# Patient Record
Sex: FEMALE | Race: WHITE | Employment: UNEMPLOYED | URBAN - METROPOLITAN AREA
[De-identification: names, ages, dates, MRNs, and addresses within clinical notes are randomized per-mention and may not be internally consistent; named-entity substitution may affect disease eponyms.]

---

## 2016-08-11 LAB — TSH SERPL-ACNC: 1.78 UIU/ML (ref 0.45–4.5)

## 2016-08-15 LAB — PAP: NORMAL

## 2018-06-27 ENCOUNTER — TRANSFERRED RECORDS (OUTPATIENT)
Dept: HEALTH INFORMATION MANAGEMENT | Facility: CLINIC | Age: 23
End: 2018-06-27

## 2018-08-17 ENCOUNTER — TRANSFERRED RECORDS (OUTPATIENT)
Dept: HEALTH INFORMATION MANAGEMENT | Facility: CLINIC | Age: 23
End: 2018-08-17

## 2018-12-10 LAB
ABO + RH BLD: NORMAL
ABO + RH BLD: NORMAL
BLD GP AB SCN SERPL QL: NORMAL
HBV SURFACE AG SERPL QL IA: NORMAL
HCT VFR BLD AUTO: 43.6 %
HEMOGLOBIN: 14.6 G/DL (ref 11.7–15.7)
HIV 1+2 AB+HIV1 P24 AG SERPL QL IA: NORMAL
PLATELET # BLD AUTO: 189 10^9/L
RUBELLA ABY IGG: 17.2
RUBELLA ANTIBODY IGG QUANTITATIVE: NORMAL IU/ML

## 2018-12-17 LAB
C TRACH DNA SPEC QL PROBE+SIG AMP: NORMAL
N GONORRHOEA DNA SPEC QL PROBE+SIG AMP: NORMAL

## 2019-02-16 LAB — HEMOGLOBIN: 13.7 G/DL (ref 11.7–15.7)

## 2019-02-19 ENCOUNTER — HOSPITAL ENCOUNTER (OUTPATIENT)
Facility: CLINIC | Age: 24
Discharge: HOME | End: 2019-02-19
Attending: SPECIALIST | Admitting: SPECIALIST
Payer: COMMERCIAL

## 2019-02-19 VITALS
TEMPERATURE: 99.9 F | DIASTOLIC BLOOD PRESSURE: 75 MMHG | HEART RATE: 94 BPM | RESPIRATION RATE: 18 BRPM | SYSTOLIC BLOOD PRESSURE: 120 MMHG

## 2019-02-19 PROBLEM — Z36.89 ENCOUNTER FOR TRIAGE IN PREGNANT PATIENT: Status: ACTIVE | Noted: 2019-02-19

## 2019-02-19 LAB — RUPTURE OF FETAL MEMBRANES BY ROM PLUS: NEGATIVE

## 2019-02-19 PROCEDURE — 84112 EVAL AMNIOTIC FLUID PROTEIN: CPT | Performed by: SPECIALIST

## 2019-02-19 PROCEDURE — G0463 HOSPITAL OUTPT CLINIC VISIT: HCPCS

## 2019-02-19 RX ORDER — VITAMIN A ACETATE, .BETA.-CAROTENE, ASCORBIC ACID, CHOLECALCIFEROL, .ALPHA.-TOCOPHEROL ACETATE, DL-, THIAMINE MONONITRATE, RIBOFLAVIN, NIACINAMIDE, PYRIDOXINE HYDROCHLORIDE, FOLIC ACID, CYANOCOBALAMIN, CALCIUM CARBONATE, FERROUS FUMARATE, ZINC OXIDE, AND CUPRIC OXIDE 2000; 2000; 120; 400; 22; 1.84; 3; 20; 10; 1; 12; 200; 27; 25; 2 [IU]/1; [IU]/1; MG/1; [IU]/1; MG/1; MG/1; MG/1; MG/1; MG/1; MG/1; UG/1; MG/1; MG/1; MG/1; MG/1
1 TABLET ORAL DAILY
COMMUNITY
End: 2019-03-05

## 2019-02-19 SDOH — HEALTH STABILITY: MENTAL HEALTH: HOW OFTEN DO YOU HAVE A DRINK CONTAINING ALCOHOL?: NEVER

## 2019-02-19 NOTE — PLAN OF CARE
1245 Spoke with Dr. Bar, updated her on above assessments and RomPlus negative.  Discharge order received.  F/U as scheduled.     1250  Patient education pamphlets given discharge instructions. Patient instructed to report change in fetal movement, vaginal leaking of fluid or bleeding, abdominal pain, or any concerns related to the pregnancy to her nurse/physician.  Patient verbalized understanding of education and verbalized agreement with plan. Discharged to home ambulatory at 1250.

## 2019-02-19 NOTE — PLAN OF CARE
"Pt [place] the [facility] [place] Pt states noticing \"clear, liquidy\" discharge this morning. Pt called her clinic which advised to come to be evaluated to rule out rupture of membranes. Verbal consent received to monitor baby. ROM + sent to lab, pending results.VSS. Doppler tones of 155.  Pt denies bleeding or pressure but states feeling some \"light cramping\". Pt reports positive fetal movement.   "

## 2019-02-19 NOTE — DISCHARGE INSTRUCTIONS
Discharge Instruction for Undelivered Patients      You were seen for: Membrane Assessment  We Consulted: Dr. Bar  You had (Test or Medicine):doptones for FHR, RomPlus Negative     Diet:   Drink 8 to 12 glasses of liquids (milk, juice, water) every day.  You may eat meals and snacks.     Activity:  Resume normal activity.    Call your provider if you notice:  Swelling in your face or increased swelling in your hands or legs.  Headaches that are not relieved by Tylenol (acetaminophen).  Changes in your vision (blurring: seeing spots or stars.)  Nausea (sick to your stomach) and vomiting (throwing up).   Weight gain of 5 pounds or more per week.  Heartburn that doesn't go away.  Signs of bladder infection: pain when you urinate (use the toilet), need to go more often and more urgently.  The bag of diaz (rupture of membranes) breaks, or you notice leaking in your underwear.  Bright red blood in your underwear.  Abdominal (lower belly) or stomach pain.  For first baby: Contractions (tightening) less than 5 minutes apart for one hour or more.  Second (plus) baby: Contractions (tightening) less than 10 minutes apart and getting stronger.  *If less than 34 weeks: Contractions (tightenings) more than 6 times in one hour.  Increase or change in vaginal discharge (note the color and amount)      Follow-up:  As scheduled in the clinic

## 2019-02-28 DIAGNOSIS — O36.80X0 PREGNANCY WITH INCONCLUSIVE FETAL VIABILITY: Primary | ICD-10-CM

## 2019-03-05 ENCOUNTER — ANCILLARY PROCEDURE (OUTPATIENT)
Dept: ULTRASOUND IMAGING | Facility: CLINIC | Age: 24
End: 2019-03-05
Payer: COMMERCIAL

## 2019-03-05 ENCOUNTER — PRENATAL OFFICE VISIT (OUTPATIENT)
Dept: MIDWIFE SERVICES | Facility: CLINIC | Age: 24
End: 2019-03-05
Payer: COMMERCIAL

## 2019-03-05 VITALS
HEIGHT: 68 IN | WEIGHT: 132 LBS | SYSTOLIC BLOOD PRESSURE: 121 MMHG | DIASTOLIC BLOOD PRESSURE: 74 MMHG | BODY MASS INDEX: 20 KG/M2 | HEART RATE: 76 BPM

## 2019-03-05 DIAGNOSIS — R33.9 URINARY RETENTION: ICD-10-CM

## 2019-03-05 DIAGNOSIS — F41.8 OTHER SPECIFIED ANXIETY DISORDERS: ICD-10-CM

## 2019-03-05 DIAGNOSIS — Z36.89 ENCOUNTER FOR FETAL ANATOMIC SURVEY: ICD-10-CM

## 2019-03-05 DIAGNOSIS — O36.80X0 PREGNANCY WITH INCONCLUSIVE FETAL VIABILITY: ICD-10-CM

## 2019-03-05 DIAGNOSIS — Z34.02 ENCOUNTER FOR SUPERVISION OF NORMAL FIRST PREGNANCY IN SECOND TRIMESTER: Primary | ICD-10-CM

## 2019-03-05 PROBLEM — Z34.00 SUPERVISION OF NORMAL IUP (INTRAUTERINE PREGNANCY) IN PRIMIGRAVIDA: Status: ACTIVE | Noted: 2019-03-05

## 2019-03-05 LAB
ALBUMIN UR-MCNC: NEGATIVE MG/DL
APPEARANCE UR: CLEAR
BILIRUB UR QL STRIP: NEGATIVE
COLOR UR AUTO: YELLOW
GLUCOSE UR STRIP-MCNC: NEGATIVE MG/DL
HGB UR QL STRIP: NEGATIVE
KETONES UR STRIP-MCNC: NEGATIVE MG/DL
LEUKOCYTE ESTERASE UR QL STRIP: NEGATIVE
NITRATE UR QL: NEGATIVE
PH UR STRIP: 7 PH (ref 5–7)
SOURCE: NORMAL
SP GR UR STRIP: 1.01 (ref 1–1.03)
UROBILINOGEN UR STRIP-ACNC: 0.2 EU/DL (ref 0.2–1)

## 2019-03-05 PROCEDURE — 81003 URINALYSIS AUTO W/O SCOPE: CPT | Performed by: ADVANCED PRACTICE MIDWIFE

## 2019-03-05 PROCEDURE — 87086 URINE CULTURE/COLONY COUNT: CPT | Performed by: ADVANCED PRACTICE MIDWIFE

## 2019-03-05 PROCEDURE — 76805 OB US >/= 14 WKS SNGL FETUS: CPT | Performed by: OBSTETRICS & GYNECOLOGY

## 2019-03-05 PROCEDURE — 99207 ZZC FIRST OB VISIT: CPT | Performed by: ADVANCED PRACTICE MIDWIFE

## 2019-03-05 ASSESSMENT — ANXIETY QUESTIONNAIRES
7. FEELING AFRAID AS IF SOMETHING AWFUL MIGHT HAPPEN: NOT AT ALL
2. NOT BEING ABLE TO STOP OR CONTROL WORRYING: NOT AT ALL
3. WORRYING TOO MUCH ABOUT DIFFERENT THINGS: NOT AT ALL
5. BEING SO RESTLESS THAT IT IS HARD TO SIT STILL: NOT AT ALL
1. FEELING NERVOUS, ANXIOUS, OR ON EDGE: SEVERAL DAYS
IF YOU CHECKED OFF ANY PROBLEMS ON THIS QUESTIONNAIRE, HOW DIFFICULT HAVE THESE PROBLEMS MADE IT FOR YOU TO DO YOUR WORK, TAKE CARE OF THINGS AT HOME, OR GET ALONG WITH OTHER PEOPLE: NOT DIFFICULT AT ALL
6. BECOMING EASILY ANNOYED OR IRRITABLE: NOT AT ALL
GAD7 TOTAL SCORE: 1

## 2019-03-05 ASSESSMENT — PATIENT HEALTH QUESTIONNAIRE - PHQ9
SUM OF ALL RESPONSES TO PHQ QUESTIONS 1-9: 1
5. POOR APPETITE OR OVEREATING: NOT AT ALL

## 2019-03-05 ASSESSMENT — MIFFLIN-ST. JEOR: SCORE: 1397.25

## 2019-03-05 NOTE — PATIENT INSTRUCTIONS
Thank you for coming to see the Midwives at the   [facility]!      We will notify you about your labs that were drawn today once we get the results back.  If you have [employer] your lab results will be posted there.      Someone from the clinic will call you personally or send you a [employer] message with your results.      If you need any refills of medications please call your pharmacy and they will contact us.      If you have a medical emergency please call 911.      If you have any concerns about today's visit, wish to schedule another appointment, or have an urgent medical concern please call our office at [phone]. You can also make appointments through [employer].      After hours you may also call the clinic number above to be connected with [facility] after hours triage nurse.  The nurse can page the midwife on call if needed. There is always a midwife on call 24 hours a day.    Prenatal Care Recommendations:    Before 14 weeks: Dating ultrasound, genetic testing       This ultrasound helps us determine your dates accurately. Innatal (genetic screening test) can be drawn anytime after 10 weeks of gestation.    16 weeks: Optional genetic testing single AFP       This testing helps understand your baby's risk for some genetic abnormalities.    18-22 weeks:  Screening anatomy ultrasound       This testing will look for early growth abnormalities, placenta location, and may tell the baby's gender if you wish to find out.    24-27 weeks: One hour diabetes test (GCT) and complete blood count       This test helps identify diabetes of pregnancy or gestational diabetes.  We also look   at the iron in your blood and how well your blood clots.    28 - 36 weeks: Tetanus shot (Tdap)       This shot helps protect you and your baby from whooping cough.    36 weeks and later: Group B Strep test (GBS)       This test helps predict if you need antibiotics in labor to prevent infection for your baby.    Anytime  September to April:  Flu shot       This shot helps protect you and your family from the flu.  This is especially important during pregnancy.        The typical schedule after your first visit today you can expect:     Visit 2 - 12-16 weeks  Visit 3 - 20 weeks  Visit 4 - 24 weeks  Visit 5 - 28 weeks  Visit 6 - 30 weeks  Visit 7 - 32 weeks  Visit 8 - 34 weeks  Visit 9 - 36 weeks  Weekly after 36 weeks until delivery.        Any time during or after your pregnancy you may experience increased depression and/or mood changes.    We are here to support you. Please contact us if you are:    Feeling anxious    Overwhelmed or sad     Trouble sleeping    Crying uncontrollably    Trouble caring for yourself or baby.    Any thoughts of hurting yourself, your baby, or anyone else    If anything comes up between your visits or you have concerns please don't hesitate to contact us.    Secure access to your medical record:  Use [employer] (secure email communication and access to your chart) to send your primary care provider a message or make an appointment. Ask someone on your Team how to sign up for [employer]. To log on to Collect or for more information in [employer] please visit the website at [email or web address].       Certified Nurse Midwife (CNM) Team    Sophia CONNOLLY, SANDEEP Johnston DNP, APRN, SANDEEP CONNOLLY, SANDEEP Sandra, APRN, CNM, [facility]  Rohini Stout, YRN, APRN, CNM      Again, thank you for choosing the midwives at [facility].  We are excited to be a part of your pregnancy. Please let us know how we can best partner with you to improve your and your family's health.    Round Ligament Pain    Pregnancy can entail many normal discomforts. One of those discomforts may be round ligament pain. Round ligament pain occurs as your uterus and your baby grow and the muscles begin to stretch more in your abdomen. Round ligament pain is normal and not dangerous but can be very  uncomfortable      Round ligament pain is typically described as an unpleasant sensation that ranges from a sharp knifelike pain to dull intermittent pain in the lower abdominal/suprapubic area of a pregnant woman      Virtually all pregnant women will experience this pain at some point during their pregnancies      It typically manifests between 16 and 20 weeks of pregnancy and can be incredibly bothersome especially for women who remain very active during their pregnancies       Please discuss with your midwife if you are having this type of pain; sometimes the pain can be associated with other medical conditions so it is important for us to assess you just to make sure    Comfort measures    There are certain things you can do to cope with round ligament pain and ease the discomfort you are having      Pregnancy support belt      Tylenol      Hot/ice packs      Baths       Exercise such as yoga and swimming that help stretch muscles      Prenatal massage      Reflexology to waist and pelvic joints       Positioning such as side-lying and hands and knees, make sure your abdomen is  well supported with pillows while doing different positions

## 2019-03-05 NOTE — RESULT ENCOUNTER NOTE
Results discussed directly with patient while patient was present. Any further details documented in the note.   MOHSEN Ovalle CNM

## 2019-03-05 NOTE — PROGRESS NOTES
SUBJECTIVE:     HPI:    This is a 23 year old female patient,  who presents for her first obstetrical visit. Wanted to get more of a personal experience with midwifery care. They have been  for about 6 months.     SOPHIA: 2019, by Ultrasound.  She is 19w6d weeks.  Her cycles are regular.  Her last menstrual period was normal.   Since her LMP, she has experienced  fatigue, headache and nasal congestion, heartburn, cramps, skin dryness, headaches, decreased libido ).   She denies nausea, emesis, abdominal pain, loss of appetite, vaginal discharge, dysuria, pelvic pain, urinary urgency, lightheadedness, urinary frequency, vaginal bleeding, hemorrhoids and constipation.    Additional History: [facility]    Have you travelled during the pregnancy?No  Have your sexual partner(s) travelled during the pregnancy?No      HISTORY:   Planned Pregnancy: Yes  Marital Status:   Occupation: not currently emplouyed  Living in Household: Spouse    Past History:  Her past medical history   Past Medical History:   Diagnosis Date     [place] (generalized anxiety disorder)      IBS (irritable bowel syndrome)      Irregular menses      Ovarian cyst    .      She has a history of  first pregnancy    Since her last LMP she denies use of alcohol, tobacco and street drugs.    Past medical, surgical, social and family history were reviewed and updated in [facility].        Current Outpatient Medications   Medication     Prenat w/o [id number]-DHA (PNV-DHA PO)     No current facility-administered medications for this visit.        ROS:   12 point review of systems negative other than symptoms noted below.  Constitutional: Fatigue  Head: Nasal Congestion  Gastrointestinal: Heartburn  Genitourinary: Cramps  Skin: Skin Dryness  Neurologic: Headaches  Endocrine: Decreased Libido      OBJECTIVE:     EXAM:  /74 (BP Location: Left arm, Patient Position: Sitting, Cuff Size: Adult Regular)   Pulse 76   Ht  "1.727 m (5' 8\")   Wt 59.9 kg (132 lb)   LMP 10/06/2018 (Approximate)   BMI 20.07 kg/m   Body mass index is 20.07 kg/m .    GENERAL: healthy, alert and no distress  EYES: Eyes grossly normal to inspection, PERRL and conjunctivae and sclerae normal  HENT: ear canals and TM's normal, nose and mouth without ulcers or lesions  NECK: no adenopathy, no asymmetry, masses, or scars and thyroid normal to palpation  RESP: lungs clear to auscultation - no rales, rhonchi or wheezes  CV: regular rate and rhythm, normal S1 S2, no S3 or S4, no murmur, click or rub, no peripheral edema and peripheral pulses strong  ABDOMEN: soft, nontender, no hepatosplenomegaly, no masses and bowel sounds normal  MS: no gross musculoskeletal defects noted, no edema  SKIN: no suspicious lesions or rashes  NEURO: Normal strength and tone, mentation intact and speech normal  PSYCH: mentation appears normal, affect normal/bright    ASSESSMENT/PLAN:       ICD-10-CM    1. Encounter for supervision of normal first pregnancy in second trimester Z34.02    2. Urinary retention R33.9 UA reflex to Microscopic     Urine Culture Aerobic Bacterial   3. Other specified anxiety disorders F41.8        23 year old , 19w6d weeks of pregnancy with SOPHIA of 2019, by Ultrasound     consult for US for AMA patients: NA  Patient [facility] 1st trimester screening [facility]     COUNSELING    Instructed on use of triage nurse line and contacting the on call CNM after hours in an emergency.     Symptoms of N&V and fatigue usually start to resolve around 12-16 weeks     Reviewed CNM philosophy, call schedule for labor and delivery, and FSH for delivery    1st OB handout given outlining appointment spacing and CNM information    Reviewed round ligament pain and handout given    Reviewed exercise and nutrition; TWG 6# discussed taking in 300 extra calories daily    Recommend to gain 25-35 pounds with her pregnancy.    Discussed OTC medications. OB med list " given    Encouraged patient to take PNV's/DHA    Travel precautions discussed, no air travel after 36 weeks and Zika Virus discussed    [facility]ed records from [facility] and labs    Plans to return in 1 month and do GCT a month after that    Will return to the clinic in 4 weeks for her next routine prenatal check.  Will call to be seen sooner if problems arise.      MOHSEN Ovalle CNM

## 2019-03-06 ENCOUNTER — TELEPHONE (OUTPATIENT)
Dept: MIDWIFE SERVICES | Facility: CLINIC | Age: 24
End: 2019-03-06

## 2019-03-06 LAB
BACTERIA SPEC CULT: NORMAL
Lab: NORMAL
SPECIMEN SOURCE: NORMAL

## 2019-03-06 ASSESSMENT — ANXIETY QUESTIONNAIRES: GAD7 TOTAL SCORE: 1

## 2019-03-06 NOTE — TELEPHONE ENCOUNTER
Melina called concerned that their new puppy who got wet playing in the snow and now smells like wet dog is harmful to her  Reassured pt that although wet dog smell is not pleasant she will not be harmed by it.   Melina's  will give the puppy a bath after work  Carmen Edward RN on 3/6/2019 at 9:16 AM

## 2019-03-07 ENCOUNTER — TELEPHONE (OUTPATIENT)
Dept: MIDWIFE SERVICES | Facility: CLINIC | Age: 24
End: 2019-03-07

## 2019-03-07 ENCOUNTER — NURSE TRIAGE (OUTPATIENT)
Dept: NURSING | Facility: CLINIC | Age: 24
End: 2019-03-07

## 2019-03-07 NOTE — TELEPHONE ENCOUNTER
Melina's new puppy had an accident on the floor. She cleaned it up-wore rubber gloves- and washed her hands. Reassured Melina that was fine to do and no there is nothing from the pupppy's accident that can be airborne-as far as bacteria-that could harm her.     Carmen Edward RN on 3/7/2019 at 1:44 PM

## 2019-03-09 ENCOUNTER — TELEPHONE (OUTPATIENT)
Dept: MIDWIFE SERVICES | Facility: CLINIC | Age: 24
End: 2019-03-09

## 2019-03-09 ENCOUNTER — NURSE TRIAGE (OUTPATIENT)
Dept: NURSING | Facility: CLINIC | Age: 24
End: 2019-03-09

## 2019-03-09 ENCOUNTER — HOSPITAL ENCOUNTER (OUTPATIENT)
Facility: CLINIC | Age: 24
Discharge: HOME | End: 2019-03-09
Attending: ADVANCED PRACTICE MIDWIFE | Admitting: ADVANCED PRACTICE MIDWIFE
Payer: COMMERCIAL

## 2019-03-09 VITALS — SYSTOLIC BLOOD PRESSURE: 118 MMHG | TEMPERATURE: 100 F | DIASTOLIC BLOOD PRESSURE: 62 MMHG | RESPIRATION RATE: 16 BRPM

## 2019-03-09 DIAGNOSIS — B96.89 BACTERIAL VAGINOSIS: Primary | ICD-10-CM

## 2019-03-09 DIAGNOSIS — N76.0 BACTERIAL VAGINOSIS: Primary | ICD-10-CM

## 2019-03-09 LAB
ABO + RH BLD: NORMAL
ABO + RH BLD: NORMAL
ALBUMIN SERPL-MCNC: 3.1 G/DL (ref 3.4–5)
ALBUMIN UR-MCNC: NEGATIVE MG/DL
ALP SERPL-CCNC: 64 U/L (ref 40–150)
ALT SERPL W P-5'-P-CCNC: 20 U/L (ref 0–50)
ANION GAP SERPL CALCULATED.3IONS-SCNC: 6 MMOL/L (ref 3–14)
APPEARANCE UR: CLEAR
AST SERPL W P-5'-P-CCNC: 20 U/L (ref 0–45)
BACTERIA #/AREA URNS HPF: ABNORMAL /HPF
BILIRUB SERPL-MCNC: 0.2 MG/DL (ref 0.2–1.3)
BILIRUB UR QL STRIP: NEGATIVE
BLD GP AB SCN SERPL QL: NORMAL
BLOOD BANK CMNT PATIENT-IMP: NORMAL
BUN SERPL-MCNC: 5 MG/DL (ref 7–30)
CALCIUM SERPL-MCNC: 9 MG/DL (ref 8.5–10.1)
CHLORIDE SERPL-SCNC: 109 MMOL/L (ref 94–109)
CO2 SERPL-SCNC: 25 MMOL/L (ref 20–32)
COLOR UR AUTO: ABNORMAL
CREAT SERPL-MCNC: 0.44 MG/DL (ref 0.52–1.04)
ERYTHROCYTE [DISTWIDTH] IN BLOOD BY AUTOMATED COUNT: 13.3 % (ref 10–15)
GFR SERPL CREATININE-BSD FRML MDRD: >90 ML/MIN/{1.73_M2}
GLUCOSE SERPL-MCNC: 98 MG/DL (ref 70–99)
GLUCOSE UR STRIP-MCNC: 30 MG/DL
HCT VFR BLD AUTO: 36.9 % (ref 35–47)
HGB BLD-MCNC: 12.6 G/DL (ref 11.7–15.7)
HGB UR QL STRIP: NEGATIVE
KETONES UR STRIP-MCNC: NEGATIVE MG/DL
LEUKOCYTE ESTERASE UR QL STRIP: NEGATIVE
MCH RBC QN AUTO: 30.7 PG (ref 26.5–33)
MCHC RBC AUTO-ENTMCNC: 34.1 G/DL (ref 31.5–36.5)
MCV RBC AUTO: 90 FL (ref 78–100)
NITRATE UR QL: NEGATIVE
PH UR STRIP: 6 PH (ref 5–7)
PLATELET # BLD AUTO: 193 10E9/L (ref 150–450)
POTASSIUM SERPL-SCNC: 4.7 MMOL/L (ref 3.4–5.3)
PROT SERPL-MCNC: 7.2 G/DL (ref 6.8–8.8)
RBC # BLD AUTO: 4.11 10E12/L (ref 3.8–5.2)
RBC #/AREA URNS AUTO: 1 /HPF (ref 0–2)
SODIUM SERPL-SCNC: 140 MMOL/L (ref 133–144)
SOURCE: ABNORMAL
SP GR UR STRIP: 1 (ref 1–1.03)
SPECIMEN EXP DATE BLD: NORMAL
SPECIMEN SOURCE: ABNORMAL
SQUAMOUS #/AREA URNS AUTO: <1 /HPF (ref 0–1)
UROBILINOGEN UR STRIP-MCNC: NORMAL MG/DL (ref 0–2)
WBC # BLD AUTO: 10.6 10E9/L (ref 4–11)
WBC #/AREA URNS AUTO: 4 /HPF (ref 0–5)
WET PREP SPEC: ABNORMAL

## 2019-03-09 PROCEDURE — G0463 HOSPITAL OUTPT CLINIC VISIT: HCPCS

## 2019-03-09 PROCEDURE — 85027 COMPLETE CBC AUTOMATED: CPT | Performed by: ADVANCED PRACTICE MIDWIFE

## 2019-03-09 PROCEDURE — 87210 SMEAR WET MOUNT SALINE/INK: CPT | Performed by: ADVANCED PRACTICE MIDWIFE

## 2019-03-09 PROCEDURE — 99214 OFFICE O/P EST MOD 30 MIN: CPT | Performed by: ADVANCED PRACTICE MIDWIFE

## 2019-03-09 PROCEDURE — 81001 URINALYSIS AUTO W/SCOPE: CPT | Performed by: ADVANCED PRACTICE MIDWIFE

## 2019-03-09 PROCEDURE — 86850 RBC ANTIBODY SCREEN: CPT | Performed by: ADVANCED PRACTICE MIDWIFE

## 2019-03-09 PROCEDURE — 86900 BLOOD TYPING SEROLOGIC ABO: CPT | Performed by: ADVANCED PRACTICE MIDWIFE

## 2019-03-09 PROCEDURE — 36415 COLL VENOUS BLD VENIPUNCTURE: CPT | Performed by: ADVANCED PRACTICE MIDWIFE

## 2019-03-09 PROCEDURE — 86901 BLOOD TYPING SEROLOGIC RH(D): CPT | Performed by: ADVANCED PRACTICE MIDWIFE

## 2019-03-09 PROCEDURE — 80053 COMPREHEN METABOLIC PANEL: CPT | Performed by: ADVANCED PRACTICE MIDWIFE

## 2019-03-09 PROCEDURE — 25000132 ZZH RX MED GY IP 250 OP 250 PS 637

## 2019-03-09 RX ORDER — METRONIDAZOLE 7.5 MG/G
1 GEL VAGINAL AT BEDTIME
Qty: 25 G | Refills: 0 | Status: SHIPPED | OUTPATIENT
Start: 2019-03-09 | End: 2019-03-15

## 2019-03-09 RX ORDER — ACETAMINOPHEN 325 MG/1
TABLET ORAL
Status: COMPLETED
Start: 2019-03-09 | End: 2019-03-09

## 2019-03-09 RX ORDER — ONDANSETRON 2 MG/ML
4 INJECTION INTRAMUSCULAR; INTRAVENOUS EVERY 6 HOURS PRN
Status: DISCONTINUED | OUTPATIENT
Start: 2019-03-09 | End: 2019-03-09 | Stop reason: HOSPADM

## 2019-03-09 RX ORDER — ACETAMINOPHEN 325 MG/1
650 TABLET ORAL EVERY 4 HOURS PRN
Status: DISCONTINUED | OUTPATIENT
Start: 2019-03-09 | End: 2019-03-09 | Stop reason: HOSPADM

## 2019-03-09 RX ADMIN — ACETAMINOPHEN 650 MG: 325 TABLET ORAL at 17:26

## 2019-03-09 RX ADMIN — ACETAMINOPHEN 650 MG: 325 TABLET, FILM COATED ORAL at 17:26

## 2019-03-09 NOTE — TELEPHONE ENCOUNTER
"Melina paged on call CNM at 0610 c/o right sided \"stretching/discomfort\" that radiates into leg. Denies sharp, shooting, and stabbing pain. Denies leakage of fluid, contractions, vaginal bleeding. States it started last night and then she was able to fall asleep, now she woke up to use the bathroom and its uncomfortable and she cannot fall back asleep. Good fetal movement.    Reviewed normal pregnancy discomforts. Discussed it sounds like round ligament pain with the stretching nature and it being one sided. Recommend position changes, heat, ice, bath, tylenol, and hydration. Patient to lay down and monitor and if pain persists or worsens may be seen in MAC for evaluation.     MOHSEN Boo, CNM    "

## 2019-03-09 NOTE — TELEPHONE ENCOUNTER
"Page received from [facility] regarding abdominal pain. Call returned to Melina who states that she has had a \"pulling/tearing\" sensation on her R side that extends from lateral of her belly button down into \"where the leg meets the abdomen\".  She reports that she spoke with Sophia HOPKINS this morning, but that the pain has not been getting better since then.  Melina did use heat on the area and \"propped myself up in bed\" after talking to Sophia, which she states helped.  Melina reports that she has not tried any Tylenol.  Melina also reports a \"tearing\" pain right under her belly button.      Melina denies any contractions, vaginal bleeding, or leaking of fluid.  She denies any urinary symptoms and states that she had a urine culture earlier this week.  Melina also denies any vaginal symptoms. She denies any constipation and states that her last bowel movement was this morning.     Discussed that the pain Melina is experiencing sounds like normal stretching and round ligament pain.  Offered Melina a MAC visit vs continuing to monitor.  Melina would like to continue to monitor at this time.  Recommended Melina take a bath, use heat and take Tylenol.  Discussed warning signs and when to call.  Will follow up with Melina later today if I do not hear back from her.    Gabriela Johnston, DNP, APRN, CNM    "

## 2019-03-09 NOTE — PLAN OF CARE
"1634- Pt presents ambulatory, unaccompanied, stating \"lower abdominal pain\" as the reason for her visit. Pt is a  20w3d midwife patient of [facility]. Gabriela Ryan CNM is on call. Pt to MAC room 2, oriented to room and call light, verbal consent for toco received, monitor applied. Fht's by doppler are 155-165 over 2 minutes with increases present and no decreases noted. Pt denies VB, LOF, low back pain or pelvic pressure. She describes a right sided vertical \"pulling or tearing\" just aside the umbilicus that radiates to the groin area and horizontal\" stretching\" below the umbilicus. Pt denies fever, chills, dysuria or vaginal symptoms. Pt denies intercourse in the past 48 hours. Has not taken anything for the pain. Heat applied helped last evening but doesn't help now.     1710-pt turned lab away, crying and shaking, stating that she has a fear of needles and has \"a lot going on besides just this\" and that she was surprised by the  with no previous mention of lab draws being done. Pt reassured and RN will call lab back after pt talks with Gabriela Ryan CNM.     171-Wet prep and UA collected and sent.    174-Lab at bedside. Gabriela Ryan CNM is with the pt.   180-Gabriela Ryan CNM at bedside to discuss results with pt.  185-Discharge instructions reviewed with pt . She verbalized understanding and agreement. Rx called to [facility] by Gabriela Johnston CNM. Pt /c'd to home ambulatory.   "

## 2019-03-09 NOTE — TELEPHONE ENCOUNTER
"Second page received from [facility] regarding abdominal pain.  Call returned to Melina who states that \"the pain is kind of the same\", but that she did notice a uterine tightening while she was eating. She reports eating and drinking normally today.  Melina desires to come in for evaluation.  MAC notified.     Gabriela Johnston, DNP, APRN, CNM    "

## 2019-03-09 NOTE — DISCHARGE INSTRUCTIONS
Discharge Instruction for Undelivered Patients      You were seen for: evaluation of abdominal pain  We Consulted: Gabriela Ryan CNM  You had (Test or Medicine):fetal heart tones by doppler, UA, labs.     Diet:   Drink 8 to 12 glasses of liquids (milk, juice, water) every day.  You may eat meals and snacks.     Activity:  Count fetal kicks everyday (see handout)  Call your doctor or nurse midwife if your baby is moving less than usual.     Call your provider if you notice:  Swelling in your face or increased swelling in your hands or legs.  Headaches that are not relieved by Tylenol (acetaminophen).  Changes in your vision (blurring: seeing spots or stars.)  Nausea (sick to your stomach) and vomiting (throwing up).   Weight gain of 5 pounds or more per week.  Heartburn that doesn't go away.  Signs of bladder infection: pain when you urinate (use the toilet), need to go more often and more urgently.  The bag of diaz (rupture of membranes) breaks, or you notice leaking in your underwear.  Bright red blood in your underwear.  Abdominal (lower belly) or stomach pain.  For first baby: Contractions (tightening) less than 5 minutes apart for one hour or more.  *If less than 34 weeks: Contractions (tightenings) more than 6 times in one hour.  Increase or change in vaginal discharge (note the color and amount)     prescription at [facility].     Follow-up:  As scheduled in the clinic

## 2019-03-09 NOTE — PROGRESS NOTES
"MATERNAL ASSESSMENT CENTER CNM TRIAGE NOTE    Melina Palacios is a 23 year old  with and IUP at 20w3d who presents with complaint of R-sided lower abdominal/pelvic pain and pain near her umbilicus.      Melina reports that pain began last night after she got out of bed to go to the bathroom. Pain starts from lateral to her belly button on her right side and goes down to \"where the leg meets the abdomen\".  She describes it as a \"pulling/tearing\" pain.  She states that the pain \"comes and goes\" and is worse with activity.  When at rest, Melina reports that the pain is a \"dull ache\".  Melina states that heat and propping herself up in bed did help, and that she hasn't tried any other comfort measures.  Melina also reports a \"tearing\" pain just underneath her belly button \"especially when I go to the bathroom\".  Melina had a negative UC this week and denies any urinary symptoms.  Melina also denies any vaginal symptoms and states that her bowel movements have been regular, with the last one being this morning.  She denies any recent intercourse.  Melina reports that she had one tightening of her uterus while she was eating, but denies having any more contractions.    Melina is anxious upon arrival.  She began crying and shaking when lab arrived to draw blood, and sent lab away per RN.  Melina states that \"I've got a lot going on\".  She consents to a UA, wet prep, and bloodwork.    Patient has felt \"a little\" fetal movement so far this pregnancy.  Denies ROM   Denies vaginal bleeding  [facility]. Transfered to [facility] at 19 weeks, on 3/5/19.    Problems this pregnancy:   1. Anxiety disorder    ROS:  Patient is alert and oriented      PHYSICAL EXAM:  LMP 10/06/2018 (Approximate)     FHT's 150's via Doppler.        Contractions: Pt is not kimberly while on the monitor for 20 minutes.  Melina states that she felt two \"tightenings\" while here but \"it just " "feels tighter down there\".    Abdomen: gravid, mildly tender to palpation at fundus.  Bloody show: no  Cervix: Deferred  Membranes are intact     Blood type A+. UA unremarkable, CBC WNL, CMP WNL for pregnancy. Tylenol given. Declined GC/CT testing.     Wet prep positive clue cells.      ASSESSMENT :   23 year old  with trevino IUP 20w3d with abdominal/pelvic pain.  Bacterial vaginosis  FHTs 150's  GBS unknown and membranes intact    PLAN:  Discharge home.  Discussed vaginal infections in pregnancy, why they occur and symptoms.  Melina reports that she has had BV before, and that the Flagyl pills are difficult for her to swallow.  Rx for Metrogel x5 days sent to preferred pharmacy.  No alcohol while using Flagyl.     Discussed that round ligament pain and fundal stretching could also be causing some of Melina's discomfort.  Reviewed comfort measures.     Discussed that if pain does not improve with treatment, if pain worsens or if any other symptoms arise such as leaking of fluid, vaginal bleeding or contractions, Melina should call or present to clinic.   Continue routine prenatal care as scheduled.    Instructed to please refer to the discharge handouts, the RN triage line or on-call CNM for any questions or concerns.  Pt verbalizes understanding and agreement with current plan of care.    MOHSEN Anne CNM      30 minutes were spent face to face with the patient today discussing her history, diagnosis, and follow-up plan as noted above. Over 50% of the visit was spent in counseling and coordination of care.    Total Visit Time: 30 minutes.     "

## 2019-03-09 NOTE — TELEPHONE ENCOUNTER
"Reason for Call/Nurse Assessment:  22 y/o female 20w 3d lower abdomen right side (constant, stronger on right side) by belly button down right leg, unable to get comfortable, has not tried any Tylenol,  No vag bleeding, no fluid leaking, baby active now, urinating fine, able to empty bladder, no fever.    RN Action/Disposiiton:  [place] for [facility], paged Sophia Rhodes in [facility] at 6:14AM, advised patient to call me back if she has no heard from Midwife in 15-20 minutes.    Blanca Arias RN - [place] Nurse Advisor    Reason for Disposition    [1] MILD abdominal pain (e.g., doesn't interfere with normal activities) AND [2] constant AND [3] present > 2 hours    Additional Information    Negative: Passed out (i.e., lost consciousness, collapsed and was not responding)    Negative: Shock suspected (e.g., cold/pale/clammy skin, too weak to stand, low BP, rapid pulse)    Negative: Difficult to awaken or acting confused  (e.g., disoriented, slurred speech)    Negative: [1] SEVERE abdominal pain (e.g., excruciating) AND [2] constant AND [3] present > 1 hour    Negative: SEVERE vaginal bleeding (e.g., continuous red blood from vagina, or large blood clots)    Negative: Sounds like a life-threatening emergency to the triager    Negative: [1] Vomiting AND [2] contains red blood or black (\"coffee ground\") material  (Exception: few red streaks in vomit that only happened once)    Negative: Leakage of fluid from vagina    Negative: [1] Baby moving less today (e.g., kick count < 5 in 1 hour or < 10 in 2 hours) AND [2] pregnant 23 or more weeks    Negative: Vaginal bleeding or spotting    Negative: MODERATE-SEVERE abdominal pain (e.g., interferes with normal activities, awakens from sleep)    Negative: New hand or face swelling    Negative: Blurred vision or visual changes    Negative: [1] SEVERE headache AND [2] not relieved with acetaminophen (e.g., Tylenol)    Protocols used: PREGNANCY - ABDOMINAL PAIN GREATER THAN " 20 WEEKS EGA-ADULT-AH

## 2019-03-09 NOTE — TELEPHONE ENCOUNTER
Melina calling with concerns that she has abdominal pain that was mostly one sided this morning, She states that she spoke with a CNM about it this morning and was told it might be round ligament pain. She states that at this time the pain has moved to the middle and its not there when she sits but otherwise it is there and she is not sure if its normal with round ligament pain.     Disposition:  2:40 PM paged Gabriela BLEVINSM on call for [facility] to call Melina back directly at [phone]. Advised Melina to call back if she doesn't hear back from the midwife in 20 minutes. She verbalized understanding.  Renate Gaxiola RN/FNA       Reason for Disposition    [1] Intermittent lower abdominal pain AND [2] present > 24 hours    Protocols used: PREGNANCY - ABDOMINAL PAIN GREATER THAN 20 WEEKS EGA-ADULT-AH

## 2019-03-09 NOTE — TELEPHONE ENCOUNTER
Reason for Call/Nurse Assessment:  Melina calling FNA again to request that the CNM be repaged due to worsening symptoms. Reports she has already spoken to the CNM earlier today. Caller did not triage with nurse.     RN Action/Disposition:  This nurse paged the on call provider (Gabriela Johnston) for [facility] at 3:57pm via clinic answering service to call the patient back directly at [phone] as requested by caller. Caller advised to call FNA back if no call from provider within 20-30 minutes. Caller agreed.    Alexia Tuttle RN  [facility]

## 2019-03-13 ENCOUNTER — NURSE TRIAGE (OUTPATIENT)
Dept: NURSING | Facility: CLINIC | Age: 24
End: 2019-03-13

## 2019-03-13 ENCOUNTER — TELEPHONE (OUTPATIENT)
Dept: MIDWIFE SERVICES | Facility: CLINIC | Age: 24
End: 2019-03-13

## 2019-03-13 DIAGNOSIS — N89.8 VAGINAL DISCHARGE: Primary | ICD-10-CM

## 2019-03-13 RX ORDER — FLUCONAZOLE 150 MG/1
150 TABLET ORAL ONCE
Qty: 1 TABLET | Refills: 0 | Status: SHIPPED | OUTPATIENT
Start: 2019-03-13 | End: 2019-03-15

## 2019-03-13 NOTE — TELEPHONE ENCOUNTER
"Call back to Melina after page by FNA. Melina states for the last ~90 minutes she had been feeling lower, mid, constant abdominal pressure type discomfort. Pressure feels more intense at time vs other times, more so with standing. She was cleaning when she noticed the pressure began. \"Ryanne feels like gas pain, but it's not.\" Bowel movements have been regular, last one this morning.  Denies ctxs or cramping. Reports currently being treated for BV, last dose of Metrogel is this evening. Called earlier about possible yeast infection and was given a rx for Diflucan. Has increased vaginal discharge, but thinks it is from the Metrogel. Does not think this abdominal pressure is related to her BV. Denies leaking of fluid, or vaginal bleeding. Positive FM.  Denies s/sx of a UTI. Has drank 60-70 ounces of water already today, but usually drinks 100 ounces.    Discussed round ligament pain, feels certain that is not what she is experiencing, as, \"that is painful.\" Has not tried anything to help alleviate abdominal pressure. States discomfort is not enough to need Tylenol.    Reviewed comfort measures (warm bath/shower, Tylenol, heating pad, frequent position changes)     Assured Melina her symptoms do not sound concerning for a miscarriage or PTL.     Warning s/sx discussed and when to report.     Offered a clinic visit if she feel her BV symptoms come back. Melina feels reassured and has no further concerns.     Hanna CONNOLLY CNM    "

## 2019-03-13 NOTE — TELEPHONE ENCOUNTER
Informed pt of Sophia Rhodes CNM's recommendations. Pt will take the diflucan and call back if symptoms have not improved by Friday or early next week   Carmen Edwadr RN on 3/13/2019 at 3:23 PM

## 2019-03-13 NOTE — TELEPHONE ENCOUNTER
"\"I started to have some on and off tightening of my stomach, so I went to lie down\".     Paged on call provider for [facility] to speak to caller at [phone].    Midwife Benjamín is on call, page sent @ 5:42 pm via smart web.    Caller advised to call back if they have not heard back from on call provider within 20 minutes.  Caller appears to understand directives and agrees with plan.    Reason for Disposition    [1] MILD abdominal pain (e.g., doesn't interfere with normal activities) AND [2] constant AND [3] present > 2 hours    Additional Information    Negative: Passed out (i.e., lost consciousness, collapsed and was not responding)    Negative: Shock suspected (e.g., cold/pale/clammy skin, too weak to stand, low BP, rapid pulse)    Negative: Difficult to awaken or acting confused  (e.g., disoriented, slurred speech)    Negative: [1] SEVERE abdominal pain (e.g., excruciating) AND [2] constant AND [3] present > 1 hour    Negative: SEVERE vaginal bleeding (e.g., continuous red blood from vagina, or large blood clots)    Negative: Sounds like a life-threatening emergency to the triager    Negative: Followed an abdomen (stomach) injury    Negative: [1] Having contractions or other symptoms of labor AND [2] >= 37 weeks pregnant (i.e., term pregnancy)    Negative: [1] Having contractions or other symptoms of labor AND [2] < 37 weeks pregnant (i.e., )    Negative: [1] Abdominal pain AND [2] pregnant < 20 weeks    Negative: [1] Vomiting AND [2] contains red blood or black (\"coffee ground\") material  (Exception: few red streaks in vomit that only happened once)    Negative: MODERATE-SEVERE abdominal pain (e.g., interferes with normal activities, awakens from sleep)    Negative: Vaginal bleeding or spotting    Negative: [1] Baby moving less today (e.g., kick count < 5 in 1 hour or < 10 in 2 hours) AND [2] pregnant 23 or more weeks    Negative: Leakage of fluid from vagina    Negative: New hand " or face swelling    Negative: Blurred vision or visual changes    Negative: [1] SEVERE headache AND [2] not relieved with acetaminophen (e.g., Tylenol)    Protocols used: PREGNANCY - ABDOMINAL PAIN GREATER THAN 20 WEEKS EGA-ADULT-    Gabriela Meng RN  [facility]

## 2019-03-13 NOTE — TELEPHONE ENCOUNTER
21w0d, SOPHIA 7/24/19. Pt was diagnosed BV and has been using the metro gel. On last day of gel today. Pt is having white chunky discharge. Pt states vagina is tender. Wanted to know if this is normal. Routing to [facility] to review. Please advise.

## 2019-03-13 NOTE — TELEPHONE ENCOUNTER
Patient may also have some yeast as a result of using the metro gel. I would recommend she take a diflucan today. I will send one to the pharmacy for her. If she still feels like things have not improved by Friday or early next week she may want to schedule a clinic appointment for a test of cure to make sure the vaginal infection has resolved.    MOHSEN Boo, CNM

## 2019-03-14 ENCOUNTER — TELEPHONE (OUTPATIENT)
Dept: MIDWIFE SERVICES | Facility: CLINIC | Age: 24
End: 2019-03-14

## 2019-03-14 NOTE — TELEPHONE ENCOUNTER
Melina's  and father removed some insulation from the attic. Did have to carry it through the house to dispose of it. Did vacuum up any insulin remnants that may have fallen in the house while carrying the insulin outside. Reassured pt that this was not a concern and not to worry.     Carmen Edward RN on 3/14/2019 at 12:41 PM

## 2019-03-15 ENCOUNTER — OFFICE VISIT (OUTPATIENT)
Dept: MIDWIFE SERVICES | Facility: CLINIC | Age: 24
End: 2019-03-15
Payer: COMMERCIAL

## 2019-03-15 VITALS
DIASTOLIC BLOOD PRESSURE: 62 MMHG | HEIGHT: 68 IN | SYSTOLIC BLOOD PRESSURE: 110 MMHG | HEART RATE: 68 BPM | WEIGHT: 137 LBS | BODY MASS INDEX: 20.76 KG/M2

## 2019-03-15 DIAGNOSIS — N89.8 VAGINAL DISCHARGE: ICD-10-CM

## 2019-03-15 DIAGNOSIS — Z34.02 ENCOUNTER FOR SUPERVISION OF NORMAL FIRST PREGNANCY IN SECOND TRIMESTER: Primary | ICD-10-CM

## 2019-03-15 LAB
GRAM STN SPEC: NORMAL
Lab: NORMAL
SPECIMEN SOURCE: ABNORMAL
SPECIMEN SOURCE: NORMAL
WET PREP SPEC: ABNORMAL

## 2019-03-15 PROCEDURE — 99213 OFFICE O/P EST LOW 20 MIN: CPT | Performed by: ADVANCED PRACTICE MIDWIFE

## 2019-03-15 PROCEDURE — 87205 SMEAR GRAM STAIN: CPT | Performed by: ADVANCED PRACTICE MIDWIFE

## 2019-03-15 PROCEDURE — 87102 FUNGUS ISOLATION CULTURE: CPT | Performed by: ADVANCED PRACTICE MIDWIFE

## 2019-03-15 PROCEDURE — 87210 SMEAR WET MOUNT SALINE/INK: CPT | Performed by: ADVANCED PRACTICE MIDWIFE

## 2019-03-15 ASSESSMENT — MIFFLIN-ST. JEOR: SCORE: 1419.93

## 2019-03-15 NOTE — RESULT ENCOUNTER NOTE
I spoke with Melina and informed her of results. Questions answered. Plan to wait for yeast culture and treat according to those results.     Zulma Rhodes

## 2019-03-15 NOTE — PROGRESS NOTES
"SUBJECTIVE:   Melina is a 23 year old here for vaginal symptoms:  was treated for BV in [facility] on Saturday, finished RX Wednesday and called with discharge, she did not take the diflucan that was prescribed at that time. Feels like there is \"a lot if thick white discharge \" but reports no other symptom. Abdominal pain/round ligament pain is much improved since she last called.                 Vaginal Symptoms     Onset: Wednesday    Description:  Vaginal Discharge: white, creamy and curd-like  Itching (Pruritis): No  Burning sensation:  No  Odor:  No  Irritation:  No    Accompanying Signs & Symptoms:  Pain with Urination: No  Abdominal Pain:  No  Fever: No   History:   Sexually active:  Yes  New Partner:  No  Possibility of Pregnancy:  Yes 21 weeks pregnant  Contraceptive type: none    Precipitating factors:   Recent Antibiotic Use: Yes: metrogel for BV    Alleviating factors:     Therapies Tried and outcome:   Previous Episodes of Vaginitis:  Yes: has used monistat in the past      Other associated symptoms: none.  History of STI's:  No  STI Testing offered: declined at transfer OB    LMP: Patient's last menstrual period was 10/06/2018 (approximate).      Patient Active Problem List   Diagnosis     Supervision of normal IUP (intrauterine pregnancy) in primigravida     Other specified anxiety disorders     Indication for care in labor or delivery     Past Medical History:   Diagnosis Date     NY (generalized anxiety disorder)      IBS (irritable bowel syndrome)      Irregular menses      Ovarian cyst      Past Surgical History:   Procedure Laterality Date     ENT SURGERY      wisdom teeth extraction      wisdom teeth       Current Outpatient Medications   Medication Sig Dispense Refill     Prenat w/o [id number] (PNV-DHA PO)        Allergies   Allergen Reactions     Azithromycin Nausea and Vomiting     In high school   Other reaction(s): GI Upset     Amoxicillin Hives and Rash     In high school        Health " "maintenance updated:  yes    ROS:   12 point review of systems negative other than symptoms noted below.  Genitourinary: Vaginal Discharge    PHYSICAL EXAM:    /62   Pulse 68   Ht 1.727 m (5' 8\")   Wt 62.1 kg (137 lb)   LMP 10/06/2018 (Approximate)   BMI 20.83 kg/m  , Body mass index is 20.83 kg/m .  General appearance:  healthy, alert and no distress  FHTs 165  Pelvic Exam:  Vulva: No lesions, no adenopathy, BUS:  wnl.   Vagina: Moist, pink, discharge consistent with yeast, large amount of thick white discharge present,  well rugated, no lesions  Rectal exam: deferred    ASSESSMENT/PLAN:     ICD-10-CM    1. Encounter for supervision of normal first pregnancy in second trimester Z34.02    2. Vaginal discharge N89.8 Wet prep     Gram stain     Yeast culture       Results for orders placed or performed in visit on 03/15/19   Wet prep   Result Value Ref Range    Specimen Description Vagina     Wet Prep Yeast seen (A)     Wet Prep No clue cells seen     Wet Prep No Trichomonas seen        COUNSELING:  Discussed taking diflucan prescribes Wednesday now but soo prefers to wait for results  Discussed option to treat with monistat 7 day  Reviewed increased discharge in pregnancy can be normal  Gram stain, yeast culture, and wet prep ordered  Reviewed warning signs of abdominal pain and when to be evaluated and also option to be seen in ED  Keep next prenatal visit  Return to clinic if symptoms persist or worsen    Per lab a different type of yeast appears to be on wet prep, plan discussed with patient to wait for yeast culture and treat accordingly    MOHSEN Boo, SANEDEP                     "

## 2019-03-15 NOTE — PROGRESS NOTES
"SUBJECTIVE:   Melina is a 23 year old here for vaginal symptoms:  Vaginal discharge with slight odor                  Vaginal Symptoms     Onset: Ongoing     Description:  Vaginal Discharge: white and curd-like  Itching (Pruritis): Yes  Burning sensation:  Yes  Odor:  Yes: mild  Irritation:  Yes    Accompanying Signs & Symptoms:  Pain with Urination: No  Abdominal Pain:  No  Fever: No   History:   Sexually active:  Yes  New Partner:  No  Possibility of Pregnancy:  Currently pregnant  Contraceptive type: none    Precipitating factors:   Recent Antibiotic Use: Yes: ***    Alleviating factors:  ***   Therapies Tried and outcome: ***  Previous Episodes of Vaginitis:  Yes: recently treated for BV      Other associated symptoms: none.  History of STI's:  No  STI Testing offered: NO (Recommended)    LMP: Patient's last menstrual period was 10/06/2018 (approximate).      Patient Active Problem List   Diagnosis     Supervision of normal IUP (intrauterine pregnancy) in primigravida     Other specified anxiety disorders     Indication for care in labor or delivery     Past Medical History:   Diagnosis Date     [place] (generalized anxiety disorder)      IBS (irritable bowel syndrome)      Irregular menses      Ovarian cyst      Past Surgical History:   Procedure Laterality Date     ENT SURGERY      wisdom teeth extraction      wisdom teeth       Current Outpatient Medications   Medication Sig Dispense Refill     Prenat w/o [id number] (PNV-DHA PO)        Allergies   Allergen Reactions     Azithromycin Nausea and Vomiting     In high school   Other reaction(s): GI Upset     Amoxicillin Hives and Rash     In high school        Health maintenance updated:  yes    ROS:   12 point review of systems negative other than symptoms noted below.  Head: Nasal Congestion  Gastrointestinal: Bloating  Genitourinary: Vaginal Discharge    PHYSICAL EXAM:    /62   Pulse 68   Ht 1.727 m (5' 8\")   Wt 62.1 kg (137 lb)   LMP 10/06/2018 " (Approximate)   BMI 20.83 kg/m  , Body mass index is 20.83 kg/m .  General appearance:  healthy, alert and no distress  Pelvic Exam:  Vulva: No lesions, no adenopathy, BUS:  wnl. ***  Vagina: Moist, pink, discharge {[id number]}  well rugated, no lesions  Cervix:smooth, pink, no visible lesions. ***  Rectal exam: deferred    ASSESSMENT/PLAN:     ICD-10-CM    1. Encounter for supervision of normal first pregnancy in second trimester Z34.02        Results for orders placed or performed during the hospital encounter of 03/09/19   UA with Microscopic reflex to Culture   Result Value Ref Range    Color Urine Light Yellow     Appearance Urine Clear     Glucose Urine 30 (A) NEG^Negative mg/dL    Bilirubin Urine Negative NEG^Negative    Ketones Urine Negative NEG^Negative mg/dL    Specific Gravity Urine 1.005 1.003 - 1.035    Blood Urine Negative NEG^Negative    pH Urine 6.0 5.0 - 7.0 pH    Protein Albumin Urine Negative NEG^Negative mg/dL    Urobilinogen mg/dL Normal 0.0 - 2.0 mg/dL    Nitrite Urine Negative NEG^Negative    Leukocyte Esterase Urine Negative NEG^Negative    Source Midstream Urine     WBC Urine 4 0 - 5 /HPF    RBC Urine 1 0 - 2 /HPF    Bacteria Urine Few (A) NEG^Negative /HPF    Squamous Epithelial /HPF Urine <1 0 - 1 /HPF   CBC with platelets   Result Value Ref Range    WBC 10.6 4.0 - 11.0 10e9/L    RBC Count 4.11 3.8 - 5.2 10e12/L    Hemoglobin 12.6 11.7 - 15.7 g/dL    Hematocrit 36.9 35.0 - 47.0 %    MCV 90 78 - 100 fl    MCH 30.7 26.5 - 33.0 pg    MCHC 34.1 31.5 - 36.5 g/dL    RDW 13.3 10.0 - 15.0 %    Platelet Count 193 150 - 450 10e9/L   Comprehensive metabolic panel   Result Value Ref Range    Sodium 140 133 - 144 mmol/L    Potassium 4.7 3.4 - 5.3 mmol/L    Chloride 109 94 - 109 mmol/L    Carbon Dioxide 25 20 - 32 mmol/L    Anion Gap 6 3 - 14 mmol/L    Glucose 98 70 - 99 mg/dL    Urea Nitrogen 5 (L) 7 - 30 mg/dL    Creatinine 0.44 (L) 0.52 - 1.04 mg/dL    GFR Estimate >90 >60 mL/min/[1.73_m2]    GFR  Estimate If Black >90 >60 mL/min/[1.73_m2]    Calcium 9.0 8.5 - 10.1 mg/dL    Bilirubin Total 0.2 0.2 - 1.3 mg/dL    Albumin 3.1 (L) 3.4 - 5.0 g/dL    Protein Total 7.2 6.8 - 8.8 g/dL    Alkaline Phosphatase 64 40 - 150 U/L    ALT 20 0 - 50 U/L    AST 20 0 - 45 U/L   ABO/Rh type and screen   Result Value Ref Range    ABO A     RH(D) Pos     Antibody Screen Neg     Test Valid Only At [facility]        Specimen Expires 03/12/2019    Wet prep   Result Value Ref Range    Specimen Description Vagina     Wet Prep No Trichomonas seen     Wet Prep No yeast seen     Wet Prep Clue cells seen (A)     Wet Prep Few  PMNs seen            COUNSELING:  Advised no alcohol during treat with flagyl  Use a probiotic when taking antibiotics  Abstain from sexual intercourse while being treated for vaginal infection  Handout provided about vaginitis and how to prevent future infections.  Return to clinic if symptoms persist or worsen    ***

## 2019-03-19 ENCOUNTER — TELEPHONE (OUTPATIENT)
Dept: MIDWIFE SERVICES | Facility: CLINIC | Age: 24
End: 2019-03-19

## 2019-03-19 LAB
Lab: NORMAL
SPECIMEN SOURCE: NORMAL
YEAST SPEC QL CULT: NORMAL

## 2019-03-26 ENCOUNTER — TELEPHONE (OUTPATIENT)
Dept: OBGYN | Facility: CLINIC | Age: 24
End: 2019-03-26

## 2019-03-26 ENCOUNTER — HOSPITAL ENCOUNTER (OUTPATIENT)
Facility: CLINIC | Age: 24
Discharge: HOME | End: 2019-03-26
Attending: ADVANCED PRACTICE MIDWIFE | Admitting: ADVANCED PRACTICE MIDWIFE
Payer: COMMERCIAL

## 2019-03-26 VITALS
DIASTOLIC BLOOD PRESSURE: 64 MMHG | BODY MASS INDEX: 20.83 KG/M2 | RESPIRATION RATE: 16 BRPM | HEIGHT: 68 IN | HEART RATE: 83 BPM | TEMPERATURE: 98.2 F | SYSTOLIC BLOOD PRESSURE: 122 MMHG

## 2019-03-26 PROBLEM — O99.340 ANXIETY DISORDER AFFECTING PREGNANCY, ANTEPARTUM: Status: ACTIVE | Noted: 2019-03-05

## 2019-03-26 PROBLEM — Z36.89 ENCOUNTER FOR TRIAGE IN PREGNANT PATIENT: Status: ACTIVE | Noted: 2019-03-26

## 2019-03-26 PROBLEM — O09.92 SUPERVISION OF HIGH RISK PREGNANCY IN SECOND TRIMESTER: Status: ACTIVE | Noted: 2019-03-05

## 2019-03-26 PROBLEM — F41.9 ANXIETY DISORDER AFFECTING PREGNANCY, ANTEPARTUM: Chronic | Status: ACTIVE | Noted: 2019-03-05

## 2019-03-26 PROBLEM — F41.9 ANXIETY DISORDER AFFECTING PREGNANCY, ANTEPARTUM: Status: ACTIVE | Noted: 2019-03-05

## 2019-03-26 PROBLEM — O99.340 ANXIETY DISORDER AFFECTING PREGNANCY, ANTEPARTUM: Chronic | Status: ACTIVE | Noted: 2019-03-05

## 2019-03-26 PROCEDURE — G0463 HOSPITAL OUTPT CLINIC VISIT: HCPCS

## 2019-03-26 PROCEDURE — 99213 OFFICE O/P EST LOW 20 MIN: CPT | Performed by: ADVANCED PRACTICE MIDWIFE

## 2019-03-26 NOTE — PROGRESS NOTES
"MATERNAL ASSESSMENT CENTER CNM TRIAGE NOTE    Melina Palacios is a 23 year old  with and IUP at 22w6d who presents with complaint of a fall.  Melina reports that she fell onto her hands and knees on her lawn at approximately 1145 this morning.  She states that she was walking with her dog across the lawn and dropped the leash; she bent over to  the leash and fell onto her knees and hands.  She denies hitting her abdomen or head; she did not lose consciousness. Melina reports that she does not have any injuries.  She denies any vaginal bleeding or fluid leaking.  She denies contractions or cramping but states that does complain of round ligament pain that was present before the fall.  Melina has been feeling baby move this pregnancy and states that baby has been moving after the fall.     Patient states baby is active.  Denies ROM   Denies vaginal bleeding  [facility].     Problems this pregnancy:   1. Round ligament pain  2. Generalized anxiety disorder    ROS:  Patient is alert and oriented      PHYSICAL EXAM:  /64   Pulse 83   Temp 98.2  F (36.8  C) (Temporal)   Resp 16   Ht 1.727 m (5' 8\")   LMP 10/06/2018 (Approximate)   BMI 20.83 kg/m      FHT's 155 with minimal variability with periods of moderate variability  Accelerations: occasional  Decelerations:  Absent  Appropriate for gestational age of 22w6d        Contractions: Pt is not kimberly     Abdomen: gravid  Bloody show: no  Cervix: Deferred  Membranes are presumed intact   No scrapes to knees or elbows      ASSESSMENT :   23 year old  with trevino IUP 22w6d for observation after fall at 1145  NST appropriate for gestational age  No evidence for fetal distress  GBS negative and membranes intact       PLAN:  Discharge home after two hours of monitoring and observation  Continue routine prenatal care; next visit scheduled for 4/3/19.    Instructed Melina to monitor " symptoms after discharge and call with any vaginal bleeding, fluid leaking, decreased fetal movement, cramping/contractions, or other concerns. Instructed to please refer to the discharge handouts, the RN triage line or on-call CNM for any questions or concerns.  Pt verbalizes understanding and agreement with current plan of care.    MOHSEN Anne CNM    15 minutes were spent face to face with the patient today discussing her history, diagnosis, and follow-up plan as noted above. Over 50% of the visit was spent in counseling and coordination of care.    Total Visit Time: 15 minutes.

## 2019-03-26 NOTE — DISCHARGE INSTRUCTIONS
Discharge Instruction for Undelivered Patients      You were seen for: Fetal Assessment following a fall  We Consulted: Gabriela Bone CNM  You had (Test or Medicine):fetal and uterine monitoring     Diet:   Drink 8 to 12 glasses of liquids (milk, juice, water) every day.  You may eat meals and snacks.     Activity:  Call your doctor or nurse midwife if your baby is moving less than usual.     Call your provider if you notice:  Swelling in your face or increased swelling in your hands or legs.  Headaches that are not relieved by Tylenol (acetaminophen).  Changes in your vision (blurring: seeing spots or stars.)  Nausea (sick to your stomach) and vomiting (throwing up).   Weight gain of 5 pounds or more per week.  Heartburn that doesn't go away.  Signs of bladder infection: pain when you urinate (use the toilet), need to go more often and more urgently.  The bag of diaz (rupture of membranes) breaks, or you notice leaking in your underwear.  Bright red blood in your underwear.  Abdominal (lower belly) or stomach pain.  For first baby: Contractions (tightening) less than 5 minutes apart for one hour or more.  Second (plus) baby: Contractions (tightening) less than 10 minutes apart and getting stronger.  *If less than 34 weeks: Contractions (tightenings) more than 6 times in one hour.  Increase or change in vaginal discharge (note the color and amount)      Follow-up:  As scheduled in the clinic

## 2019-03-26 NOTE — TELEPHONE ENCOUNTER
Pt fell on her hands and knees. Pt is having cramping and round ligament pain feels worse. Pt is not feeling FM that frequently and has not felt since fall.  Denies vag bleeding. Did not hit abd. Gabriela Johnston informed. Pt to go to MAC at [facility] for monitoring. Pt informed. [facility] informed pt coming.

## 2019-03-26 NOTE — PLAN OF CARE
Two hours of monitoring and observation completed without concerns.  Patient education pamphlets given on fetal movement counts and discharge summary. Patient instructed to report change in fetal movement, vaginal leaking of fluid or bleeding, abdominal pain, or any concerns related to the pregnancy to her nurse/physician. Follow-up in clinic as scheduled.  Patient verbalized understanding of education and verbalized agreement with plan. Discharged to home ambulatory at 1450.

## 2019-03-26 NOTE — PLAN OF CARE
1235  Received patient from home for monitoring after a fall.   at 22w6d gestation.  Patient denies vaginal bleeding, leaking of fluid, and uterine cramping.  Denies any pain.  States she was out in the yard with her dog, dropped the leash, and fell onto her hands and knees while retrieving the leash. States she did not hit her abdomen. Discussed plan of care, verbal consent obtained for treatment.  Monitors placed.  Admission database completed.  Call light within reach.  Water provided.      1250  DEJON Bone CNM paged and updated.  Orders received.  Patient updated on POC.  Plans to order lunch, menu provided.  DEJON Bone CNM will be by later to see patient.

## 2019-03-27 ENCOUNTER — TELEPHONE (OUTPATIENT)
Dept: MIDWIFE SERVICES | Facility: CLINIC | Age: 24
End: 2019-03-27

## 2019-03-27 NOTE — TELEPHONE ENCOUNTER
Karin from  [facility] just called as an FYI that she is the nurse to contact with any concerns.  [phone].  No call back needed.

## 2019-03-29 ENCOUNTER — NURSE TRIAGE (OUTPATIENT)
Dept: NURSING | Facility: CLINIC | Age: 24
End: 2019-03-29

## 2019-03-30 NOTE — TELEPHONE ENCOUNTER
"Patient states she has had pain in her Left foot all day.  Currently reports she sees a \"tiny 2-3 mm sliver under the skin in my foot.\"  Describes the pain as a \"2-3\" on a 1-10 pain scale.  Denies redness. Denies swelling.   States not sure when about her last tetanus immunization.  This RN began triage per FNA guidelines and caller abruptly states she did not care to answer all these questions and states she is going to hang up. States she just wanted to know what she should do.    Protocol-  Foot pain- Adult  Care advice reviewed.   Disposition-  Triage not completed.  Caller ended call abruptly prior to advising re disposition.    KITTY KimN RN  [facility]     Additional Information    Negative: Entire foot is cool or blue in comparison to other foot    Negative: Purple or black skin on foot or toe    Negative: [1] Red area or streak AND [2] fever    Negative: [1] Swollen foot AND [2] fever    Negative: Patient sounds very sick or weak to the triager    Negative: [1] SEVERE pain (e.g., excruciating, unable to do any normal activities) AND [2] not improved after 2 hours of pain medicine    Negative: [1] Looks infected (spreading redness, pus) AND [2] large red area (> 2 in. or 5 cm)    Negative: Looks like a boil, infected sore, or deep ulcer    Negative: [1] Redness of the skin AND [2] no fever    Negative: [1] Swollen foot AND [2] no fever  (Exceptions: localized bump from bunions, calluses, insect bite, sting)    Negative: Numbness in one foot (i.e., loss of sensation)    Negative: [1] MODERATE pain (e.g., interferes with normal activities, limping) AND [2] present > 3 days    Negative: [1] Numbness or tingling in feet AND [2] new or increased    Negative: Pain in the big toe joint    Negative: [1] MILD pain (e.g., does not interfere with normal activities) AND [2] present > 7 days    Negative: Foot pain is a chronic symptom (recurrent or ongoing AND present > 4 weeks)    Negative: Caused by " known bunions, plantar wart, or flat feet    Protocols used: FOOT PAIN-ADULT-AH

## 2019-04-01 ENCOUNTER — TELEPHONE (OUTPATIENT)
Dept: MIDWIFE SERVICES | Facility: CLINIC | Age: 24
End: 2019-04-01

## 2019-04-01 NOTE — TELEPHONE ENCOUNTER
"Pt calling wondering about handling the mat \"puppy pad\" for her puppy's cage. It is a new mat as he chewed through his old one. Advised it was ok for her to place the new mat in the puppy's cage. Also advised good hand hygiene  .Carmen Edward RN on 4/1/2019 at 12:43 PM      "

## 2019-04-01 NOTE — TELEPHONE ENCOUNTER
@ 23w5d  Returned pt call  Pt had questions about bleeding-rectal.  Up to BR and noted bright red blood in toilet.  Had BM in the morning but not at the time of the bleeding event. She states she has been experiencing bleeding with BM's over the past few weeks. Denies constipation or straining.  Not on a stool softener.    Denies vaginal bleeding or LOF. Stated she had normal white vaginal discharge that was not discolored at the time of the bleeding episode.  Reporting active FM today.  Continues to have her occasional mild/dull cramps, not consistent, usually after she has been walking but otherwise no contractions or regular cramping.    Has apt with GILBERTO Butts 4/3/19. Reassured pt that hemorrhoids are common with pregnancy and this could very well be what is going on although pt unable to look at area w a mirror. Denies discomfort or pain of rectum or with passing stool.    Encouraged pt to increase po fluids and may try OTC Colace 100mg daily to avoid constipation/straining to aggravate a hemorrhoid further. Encouraged to call with further bleeding, cramping, pain, LOF or DFM.  Pt verbalized understanding, in agreement with plan, and voiced no further questions.    Pt was transferred to scheduling to see if she could be seen tomorrow rather than Wednesday by the midwives per pt request.

## 2019-04-02 ENCOUNTER — PRENATAL OFFICE VISIT (OUTPATIENT)
Dept: MIDWIFE SERVICES | Facility: CLINIC | Age: 24
End: 2019-04-02
Payer: COMMERCIAL

## 2019-04-02 VITALS — DIASTOLIC BLOOD PRESSURE: 60 MMHG | WEIGHT: 138 LBS | SYSTOLIC BLOOD PRESSURE: 108 MMHG | BODY MASS INDEX: 20.98 KG/M2

## 2019-04-02 DIAGNOSIS — O09.92 SUPERVISION OF HIGH RISK PREGNANCY IN SECOND TRIMESTER: ICD-10-CM

## 2019-04-02 PROBLEM — Z36.89 ENCOUNTER FOR TRIAGE IN PREGNANT PATIENT: Status: RESOLVED | Noted: 2019-03-26 | Resolved: 2019-04-02

## 2019-04-02 PROCEDURE — 99207 ZZC PRENATAL VISIT: CPT | Performed by: ADVANCED PRACTICE MIDWIFE

## 2019-04-02 ASSESSMENT — ANXIETY QUESTIONNAIRES
2. NOT BEING ABLE TO STOP OR CONTROL WORRYING: NOT AT ALL
6. BECOMING EASILY ANNOYED OR IRRITABLE: NOT AT ALL
5. BEING SO RESTLESS THAT IT IS HARD TO SIT STILL: NOT AT ALL
1. FEELING NERVOUS, ANXIOUS, OR ON EDGE: SEVERAL DAYS
IF YOU CHECKED OFF ANY PROBLEMS ON THIS QUESTIONNAIRE, HOW DIFFICULT HAVE THESE PROBLEMS MADE IT FOR YOU TO DO YOUR WORK, TAKE CARE OF THINGS AT HOME, OR GET ALONG WITH OTHER PEOPLE: SOMEWHAT DIFFICULT
3. WORRYING TOO MUCH ABOUT DIFFERENT THINGS: NOT AT ALL
GAD7 TOTAL SCORE: 1
7. FEELING AFRAID AS IF SOMETHING AWFUL MIGHT HAPPEN: NOT AT ALL

## 2019-04-02 ASSESSMENT — PATIENT HEALTH QUESTIONNAIRE - PHQ9
SUM OF ALL RESPONSES TO PHQ QUESTIONS 1-9: 2
5. POOR APPETITE OR OVEREATING: NOT AT ALL

## 2019-04-02 NOTE — PROGRESS NOTES
Feels well, wondering if she can get her eyebrows waxes or eat at a buffet for [employer].  Okay for both, recommend she use caution with meats and avoid raw foods  Fetal movement: positive   Denies loss of fluid/vb/contractions  GCT next visit, handout provided, reminded of longer appointment  Tdap next visit; reviewed CDC recommendations and partner/family vaccination recommended as well  Water birth discussed, patient is interested; handout given  Will draw hep c next time if she decides she wants to sign consent  Melina had some rectal bleeding last night, was collecting in toilet, when she wiped, and passed gas. Does not report any difficult bowel movements.   No evidence of external hemorrhoid; discussed could be an internal hemorrhoid. Encouraged to try colace once daily or BID, increase fluids. Try OTC suppositories, if no improvement or bleeding increases, referral to colo rectal or present to ED.    Return to clinic 4 weeks    MOHSEN Boo, SANDEEP

## 2019-04-02 NOTE — PATIENT INSTRUCTIONS
GESTATIONAL DIABETES SCREENING    All pregnant women are screened at least once for diabetes as part of their prenatal care. A woman has gestational diabetes if she has high blood sugars for the first time during pregnancy.      Diabetes can harm your health and the health of your baby.  But if we find the diabetes early in pregnancy we will watch your health closely and prevent further problems.       We will check for gestational diabetes during your visit between 24-28 weeks visit. Please note you can not do this prior to 24 weeks of gestation.      Plan to spend about an hour at the clinic.  When you check in let us know that you will be having your diabetes screening that day.       We will give you a 50 gram glucose drink that you have 5 minutes to consume.  Exactly one hour later you will have draw blood from your arm to check your blood sugar level.      We will call you to let you know if your results are normal.  If the results are normal no more testing will be needed.  If your results are not normal we will discuss follow up testing with you.        You may eat prior to the testing but it is not recommended to eat or drink very sweet things such as pop, juice, candy or dessert type foods.  Eat a high protein, low carb meals prior to testing.    If you have any questions please call:    [facility]    [phone]

## 2019-04-03 ASSESSMENT — ANXIETY QUESTIONNAIRES: GAD7 TOTAL SCORE: 1

## 2019-04-12 ENCOUNTER — TELEPHONE (OUTPATIENT)
Dept: MIDWIFE SERVICES | Facility: CLINIC | Age: 24
End: 2019-04-12

## 2019-04-12 ENCOUNTER — NURSE TRIAGE (OUTPATIENT)
Dept: NURSING | Facility: CLINIC | Age: 24
End: 2019-04-12

## 2019-04-13 NOTE — TELEPHONE ENCOUNTER
Call back after page by FNA. Complaints of having dull, achy, constant lower back pain and intermittent generalized abdominal pain dependent on fetal movement. Noticed discomfort a few hours ago after dinner. Ate a couple of slices of pizza. Denies heartburn, or vomiting, did have some nausea earlier. Rates pain a 5/10 now, 6-7/10 when talking with triage RN. States having a history of IBS, but hasn't experienced this sort of discomfort in pregnancy thus far.   Positive FM, denies LOF or bleeding. Denies s/sx of a UTI. Has not tried anything for the pain.   Encouraged to try Tylenol or Tylenol PM, warm bath or shower and rest for the remainder of the night and tomorrow if needed.   Warning s/sx discussed and when to call.     Hanna CONNOLLY CNM

## 2019-04-13 NOTE — TELEPHONE ENCOUNTER
Melina calls and says that she has abdominal pain and left back pain. Pain began tonight after dinner. Pt. Is 25w2d pregnant. Hanna Butts-[facility] CN-was paged, to call pt., at: [phone], at: [phone], via smart web.    Reason for Disposition    MODERATE-SEVERE abdominal pain (e.g., interferes with normal activities, awakens from sleep)    Additional Information    Negative: Shock suspected (e.g., cold/pale/clammy skin, too weak to stand, low BP, rapid pulse)    Negative: SEVERE abdominal pain    Negative: Sounds like a life-threatening emergency to the triager    Negative: Major injury to the back (e.g., MVA, fall > 10 feet or 3 meters, penetrating injury, etc.)    Negative: Followed a tailbone injury    Negative: [1] Having contractions or other symptoms of labor AND [2] >= 37 weeks pregnant (i.e., term pregnancy)    Negative: [1] Having contractions or other symptoms of labor AND [2] < 37 weeks pregnant (i.e., )    [1] Abdominal pain AND [2] pregnant > 20 weeks    Negative: Passed out (i.e., lost consciousness, collapsed and was not responding)    Negative: Shock suspected (e.g., cold/pale/clammy skin, too weak to stand, low BP, rapid pulse)    Negative: Difficult to awaken or acting confused  (e.g., disoriented, slurred speech)    Negative: [1] SEVERE abdominal pain (e.g., excruciating) AND [2] constant AND [3] present > 1 hour    Negative: SEVERE vaginal bleeding (e.g., continuous red blood from vagina, or large blood clots)    Negative: Sounds like a life-threatening emergency to the triager    Negative: Followed an abdomen (stomach) injury    Negative: [1] Having contractions or other symptoms of labor AND [2] >= 37 weeks pregnant (i.e., term pregnancy)    Negative: [1] Having contractions or other symptoms of labor AND [2] < 37 weeks pregnant (i.e., )    Negative: [1] Abdominal pain AND [2] pregnant < 20 weeks    Negative: [1] Vomiting AND [2] contains red blood or black  "(\"coffee ground\") material  (Exception: few red streaks in vomit that only happened once)    Protocols used: PREGNANCY - ABDOMINAL PAIN GREATER THAN 20 WEEKS EGA-ADULT-AH, PREGNANCY - BACK PAIN-ADULT-AH      "

## 2019-04-17 ENCOUNTER — TELEPHONE (OUTPATIENT)
Dept: MIDWIFE SERVICES | Facility: CLINIC | Age: 24
End: 2019-04-17

## 2019-04-17 ENCOUNTER — NURSE TRIAGE (OUTPATIENT)
Dept: NURSING | Facility: CLINIC | Age: 24
End: 2019-04-17

## 2019-04-17 DIAGNOSIS — Z23 NEED FOR TDAP VACCINATION: ICD-10-CM

## 2019-04-17 DIAGNOSIS — Z36.9 ENCOUNTER FOR ANTENATAL SCREENING OF MOTHER: Primary | ICD-10-CM

## 2019-04-17 NOTE — TELEPHONE ENCOUNTER
"\"I am 26 weeks pregnant.\" \"I woke with a weird spot in my underwear.\" Patient reporting watery \"sweet fluid.\" Reports fetal movement is positive. Denies previous history of similar symptoms.   Paged on call Midwife Gabriela Johnston CNM through [employer] [facility] [employer] at 711 a.m. To call patient . Advised patient if no return call with in 20 minutes to call back. Caller verbalized understanding. Denies further questions.    Gina Scott, RN  [facility]        Reason for Disposition    Leakage of fluid from vagina    Additional Information    Negative: [1] Vaginal bleeding AND [2] pregnant > 20 weeks    Negative: [1] Vaginal bleeding AND [2] pregnant < 20 weeks    Negative: [1] Having contractions or other symptoms of labor AND [2] < 37 weeks pregnant (i.e., )    Negative: [1] Having contractions or other symptoms of labor AND [2] > 36 weeks pregnant (i.e., term pregnancy)    Leakage of fluid (trickle, gush) from the vagina    Negative: [1] SEVERE abdominal pain (e.g., excruciating) AND [2] constant AND [3] present > 1 hour    Negative: Severe bleeding (e.g., continuous red blood from vagina, or large blood clots)    Negative: Umbilical cord hanging out of the vagina (shiny, white, curled appearance, \"like telephone cord\")    Negative: Uncontrollable urge to push (i.e., feels like baby is coming out now)    Negative: Can see baby    Negative: Sounds like a life-threatening emergency to the triager    Negative: < 20 weeks pregnant    Negative: Vaginal bleeding    Protocols used: PREGNANCY - RUPTURE OF MEMBRANES-ADULT-AH, PREGNANCY - VAGINAL DISCHARGE-ADULT-AH      "

## 2019-04-17 NOTE — TELEPHONE ENCOUNTER
"Page received from [facility] regarding fluid spot in underwear.  Called Melina back who states that when she woke up this morning and went to use the bathroom, she noticed a \"medium-sized wet spot\" in her underwear.  The spot was \"a little bigger than a quarter\" and \"smelled sweet\".  Melina reports that she did not feel any gushes or have any fluid run down her leg. She has not noticed any further wetness since. \"It was different than my normal discharge; I'm not sure if I could have peed a little\".  Melina states that baby is active today and denies any contractions (apart from mild cramping each morning when she wakes up with a full bladder) and denies any vaginal bleeding.     Discussed that wet spot could be discharge vs sweat vs urine.  Discussed warning signs of SROM.  Recommended that Melina lay down for 30 minutes and then stand up quickly; advised Melina that, if she noticed any fluid leak out upon standing, she should call back.  Melina agrees; She voiced understanding and denied further questions or concerns.     Gabriela Louis, DNP, APRN, CNM    "

## 2019-04-25 ENCOUNTER — PRENATAL OFFICE VISIT (OUTPATIENT)
Dept: MIDWIFE SERVICES | Facility: CLINIC | Age: 24
End: 2019-04-25
Payer: COMMERCIAL

## 2019-04-25 VITALS — BODY MASS INDEX: 21.59 KG/M2 | SYSTOLIC BLOOD PRESSURE: 120 MMHG | WEIGHT: 142 LBS | DIASTOLIC BLOOD PRESSURE: 78 MMHG

## 2019-04-25 DIAGNOSIS — O09.92 SUPERVISION OF HIGH RISK PREGNANCY IN SECOND TRIMESTER: ICD-10-CM

## 2019-04-25 DIAGNOSIS — Z36.9 ENCOUNTER FOR ANTENATAL SCREENING OF MOTHER: ICD-10-CM

## 2019-04-25 LAB
ERYTHROCYTE [DISTWIDTH] IN BLOOD BY AUTOMATED COUNT: 12.9 % (ref 10–15)
GLUCOSE 1H P 50 G GLC PO SERPL-MCNC: 110 MG/DL (ref 60–129)
HCT VFR BLD AUTO: 34.6 % (ref 35–47)
HGB BLD-MCNC: 11.4 G/DL (ref 11.7–15.7)
MCH RBC QN AUTO: 29.9 PG (ref 26.5–33)
MCHC RBC AUTO-ENTMCNC: 32.9 G/DL (ref 31.5–36.5)
MCV RBC AUTO: 91 FL (ref 78–100)
PLATELET # BLD AUTO: 219 10E9/L (ref 150–450)
RBC # BLD AUTO: 3.81 10E12/L (ref 3.8–5.2)
WBC # BLD AUTO: 10.5 10E9/L (ref 4–11)

## 2019-04-25 PROCEDURE — 85027 COMPLETE CBC AUTOMATED: CPT | Performed by: ADVANCED PRACTICE MIDWIFE

## 2019-04-25 PROCEDURE — 36415 COLL VENOUS BLD VENIPUNCTURE: CPT | Performed by: ADVANCED PRACTICE MIDWIFE

## 2019-04-25 PROCEDURE — 82950 GLUCOSE TEST: CPT | Performed by: ADVANCED PRACTICE MIDWIFE

## 2019-04-25 PROCEDURE — 99207 ZZC PRENATAL VISIT: CPT | Performed by: ADVANCED PRACTICE MIDWIFE

## 2019-04-25 PROCEDURE — 86803 HEPATITIS C AB TEST: CPT | Performed by: ADVANCED PRACTICE MIDWIFE

## 2019-04-25 NOTE — PROGRESS NOTES
Feels well  Fetal movement: positive, denies loss of fluid/vb/contractions  GCT, CBC drawn today  Tdap given: Yes  Rhogam: No A+  Anti Treponema drawn: No, wants done at next visit  Hep C drawn: Yes  Water birth consent signed: Yes  Consent signed  Reviewed PTL precautions and S&S of [facility], patient verbalizes understanding and what to report  Hospital Registration reminder-online    Return to clinic 2 weeks    Hanna CONNOLLY CNM      TDAP: at 29 week visit

## 2019-04-25 NOTE — PROGRESS NOTES
Syphilis is a sexually transmitted disease that can cause birth defects in the babies of untreated mothers. Every pregnant patient is tested for syphilis early in each pregnancy as part of the routine lab work. The [place] Department of [facility] has seen an increase in the rate of syphilis in [place]. The [facility] now recommends testing for syphilis 3 times during a pregnancy, the new prenatal visit, 28 weeks and when admitted for delivery. Patient declines lab testing for syphilis.

## 2019-04-25 NOTE — PATIENT INSTRUCTIONS

## 2019-04-26 LAB — HCV AB SERPL QL IA: NONREACTIVE

## 2019-04-26 NOTE — RESULT ENCOUNTER NOTE
Please call the patient with the results. Hep C was negative.    Thanks--   Gabriela Johnston, YRN, APRN, CNM

## 2019-04-29 ENCOUNTER — TELEPHONE (OUTPATIENT)
Dept: OBGYN | Facility: CLINIC | Age: 24
End: 2019-04-29

## 2019-04-29 NOTE — TELEPHONE ENCOUNTER
Pt drinking water and realized her cup was dirty and had a film of dried milk in it. She dumped the water out and got a clean glass. Reassured pt. No further questions.   Carmen Edward RN on 4/29/2019 at 3:44 PM

## 2019-05-01 ENCOUNTER — NURSE TRIAGE (OUTPATIENT)
Dept: NURSING | Facility: CLINIC | Age: 24
End: 2019-05-01

## 2019-05-02 ENCOUNTER — PRENATAL OFFICE VISIT (OUTPATIENT)
Dept: MIDWIFE SERVICES | Facility: CLINIC | Age: 24
End: 2019-05-02
Payer: COMMERCIAL

## 2019-05-02 ENCOUNTER — TELEPHONE (OUTPATIENT)
Dept: OBGYN | Facility: CLINIC | Age: 24
End: 2019-05-02

## 2019-05-02 VITALS
HEIGHT: 68 IN | SYSTOLIC BLOOD PRESSURE: 108 MMHG | BODY MASS INDEX: 21.73 KG/M2 | DIASTOLIC BLOOD PRESSURE: 70 MMHG | WEIGHT: 143.4 LBS

## 2019-05-02 DIAGNOSIS — N89.8 VAGINAL DISCHARGE: Primary | ICD-10-CM

## 2019-05-02 LAB
ALBUMIN UR-MCNC: NEGATIVE MG/DL
APPEARANCE UR: CLEAR
BILIRUB UR QL STRIP: NEGATIVE
COLOR UR AUTO: YELLOW
GLUCOSE UR STRIP-MCNC: NEGATIVE MG/DL
GRAM STN SPEC: NORMAL
HGB UR QL STRIP: ABNORMAL
KETONES UR STRIP-MCNC: NEGATIVE MG/DL
LEUKOCYTE ESTERASE UR QL STRIP: NEGATIVE
Lab: NORMAL
NITRATE UR QL: NEGATIVE
PH UR STRIP: 7 PH (ref 5–7)
RBC #/AREA URNS AUTO: ABNORMAL /HPF
SOURCE: ABNORMAL
SP GR UR STRIP: 1.01 (ref 1–1.03)
SPECIMEN SOURCE: NORMAL
SPECIMEN SOURCE: NORMAL
UROBILINOGEN UR STRIP-ACNC: 0.2 EU/DL (ref 0.2–1)
WBC #/AREA URNS AUTO: ABNORMAL /HPF
WET PREP SPEC: NORMAL

## 2019-05-02 PROCEDURE — 87102 FUNGUS ISOLATION CULTURE: CPT | Performed by: ADVANCED PRACTICE MIDWIFE

## 2019-05-02 PROCEDURE — 99213 OFFICE O/P EST LOW 20 MIN: CPT | Performed by: ADVANCED PRACTICE MIDWIFE

## 2019-05-02 PROCEDURE — 87205 SMEAR GRAM STAIN: CPT | Performed by: ADVANCED PRACTICE MIDWIFE

## 2019-05-02 PROCEDURE — 81001 URINALYSIS AUTO W/SCOPE: CPT | Performed by: ADVANCED PRACTICE MIDWIFE

## 2019-05-02 PROCEDURE — 87086 URINE CULTURE/COLONY COUNT: CPT | Performed by: ADVANCED PRACTICE MIDWIFE

## 2019-05-02 PROCEDURE — 87210 SMEAR WET MOUNT SALINE/INK: CPT | Performed by: ADVANCED PRACTICE MIDWIFE

## 2019-05-02 ASSESSMENT — MIFFLIN-ST. JEOR: SCORE: 1448.96

## 2019-05-02 NOTE — TELEPHONE ENCOUNTER
"Received page from [facility] to call patient regarding increased vaginal discharge.      Spoke to Melina.  She states that as she was getting ready for bed and changing clothes, she noticed an increased amount of vaginal discharge in her underwear.  She states that the discharge appears \"slightly\" light brown and that it has a sweeter odor.  She states that she had diarrhea earlier in the evening.  She states that she had a vaginal infection earlier in her pregnancy and is concerned she has another infection.    She denies any cramping/abd pain/LOF/urinary signs and symptoms/vaginal signs and symptoms, +fetal movement.      Counseled to monitor discharge and signs and symptoms.  If any change/increase in discharge or if she should develop any additional signs and symptoms, counseled to call back.  Also discussed option to call clinic in the AM and schedule appointment for and evaluation 5/2 or 5/3.  Verbalized understanding, all questions answered.     Madeline CONNOLLY, SANDEEP  "

## 2019-05-02 NOTE — PROGRESS NOTES
"    SUBJECTIVE:                                                   Melina Palacios is a 23 year old female who presents to clinic today for the following health issue(s):  Patient presents with:  Vaginal Problem: patient states she has been having brown/orange tint discharge as well as odor for the last few days    Here today with concerns of increased vaginal discharge, states had BV earlier in pregnancy and waited too long for treatment. Wonders if that is what is happening now.   Noticed discharge a few days ago. Odor is stronger than usual and darker, \" not normal for me.\"  Denies LOF, vaginal bleeding, Positive FM.   Last intercourse: Few weeks ago.       Patient's last menstrual period was 10/06/2018 (approximate).   Patient is sexually active, .  Using none for contraception.    reports that she has never smoked. She has never used smokeless tobacco.    STD testing offered?  Declined    Health maintenance updated:  yes    Today's PHQ-2 Score: No flowsheet data found.  Today's PHQ-9 Score:   PHQ-9 SCORE 2019   PHQ-9 Total Score 2     Today's NY-7 Score:   [place]-7 SCORE 2019   Total Score 1       Problem list and histories reviewed & adjusted, as indicated.  Additional history: as documented.    Patient Active Problem List   Diagnosis     Supervision of high risk pregnancy in second trimester     Anxiety disorder affecting pregnancy, antepartum     Past Surgical History:   Procedure Laterality Date     ENT SURGERY      wisdom teeth extraction      wisdom teeth        Social History     Tobacco Use     Smoking status: Never Smoker     Smokeless tobacco: Never Used   Substance Use Topics     Alcohol use: No     Frequency: Never      Problem (# of Occurrences) Relation (Name,Age of Onset)    Breast Cancer (3) Mother, Maternal Aunt, Paternal Grandmother    Cancer (2) Mother, Maternal Aunt    Thyroid Disease (1) Maternal Grandmother            Current Outpatient Medications   Medication Sig     Prenat " "w/o [id number] (PNV-DHA PO)      No current facility-administered medications for this visit.      Allergies   Allergen Reactions     Azithromycin Nausea and Vomiting     In high school   Other reaction(s): GI Upset     Amoxicillin Hives and Rash     In high school        ROS:  12 point review of systems negative other than symptoms noted below.  Gastrointestinal: Diarrhea  Genitourinary: Vaginal Discharge    OBJECTIVE:     /70   Ht 1.727 m (5' 8\")   Wt 65 kg (143 lb 6.4 oz)   LMP 10/06/2018 (Approximate)   BMI 21.80 kg/m    Body mass index is 21.8 kg/m .         Exam:  Constitutional:  Appearance: Well nourished, well developed alert, in no acute distress  Pelvic Exam:  External Genitalia:     Normal appearance for age, no discharge present, no tenderness present, no inflammatory lesions present, color normal  Vagina:     Normal vaginal vault without central or paravaginal defects, thick copious amounts of white discharge, no inflammatory lesions present, no masses present  Urethra:   Urethral Body:  Urethra palpation normal, urethra structural support normal     Urethral Meatus:  No erythema or lesions present  Cervix:     Appearance healthy, no lesions present, nontender to palpation, no bleeding present    Perineum:     Perineum within normal limits, no evidence of trauma, no rashes or skin lesions present    Pubic Hair:     Normal pubic hair distribution for age  Genitalia and Groin:     No rashes present, no lesions present, no areas of discoloration, no masses present       In-Clinic Test Results:  Results for orders placed or performed in visit on 05/02/19 (from the past 24 hour(s))   Wet prep   Result Value Ref Range    Specimen Description Vagina     Wet Prep No Trichomonas seen     Wet Prep No clue cells seen     Wet Prep No yeast seen     Wet Prep No WBC's seen     Wet Prep (Note)  PACKED LACTO AND EPI'S      UA with Microscopic   Result Value Ref Range    Color Urine Yellow     " Appearance Urine Clear     Glucose Urine Negative NEG^Negative mg/dL    Bilirubin Urine Negative NEG^Negative    Ketones Urine Negative NEG^Negative mg/dL    Specific Gravity Urine 1.010 1.003 - 1.035    pH Urine 7.0 5.0 - 7.0 pH    Protein Albumin Urine Negative NEG^Negative mg/dL    Urobilinogen Urine 0.2 0.2 - 1.0 EU/dL    Nitrite Urine Negative NEG^Negative    Blood Urine 1+ (A) NEG^Negative    Leukocyte Esterase Urine Negative NEG^Negative    Source Midstream Urine     WBC Urine 0 - 5 OTO5^0 - 5 /HPF    RBC Urine O - 2 OTO2^O - 2 /HPF       ASSESSMENT/PLAN:                                                        ICD-10-CM    1. Vaginal discharge N89.8 Wet prep     UA with Microscopic     Urine Culture Aerobic Bacterial     Gram stain     Yeast culture       Will call with results and treat if necessary.       MOHSEN Garcia CNM    20 minutes was spent face to face with the patient today discussing her history, diagnosis, and follow-up plan as noted above. Over 50% of the visit was spent in counseling and coordination of care.    Total Visit Time: 20 minutes.

## 2019-05-02 NOTE — TELEPHONE ENCOUNTER
"\"I am 28 weeks pregnant and today I am having increased discharge. It looks different and smells different. It's a brownish, red/orange and smells sweet.\" baby is active, denies pain or leakage of fluids or fever. Patient did state she had some cramping and diarrhea(times one) earlier today. Triaged and advised ER. Patient prefers to have midwife paged. Paged midwife Madeline Sandra ([facility]) though page op at 11:21 pm to call pt at [phone]. Call back if needed.  Hallie Magana RN [facility]        Reason for Disposition    [1] Pregnant 24-36 weeks () AND [2] pinkish or brownish mucous discharge    Additional Information    Negative: [1] Vaginal bleeding AND [2] pregnant > 20 weeks    Negative: [1] Vaginal bleeding AND [2] pregnant < 20 weeks    Negative: [1] Having contractions or other symptoms of labor AND [2] < 37 weeks pregnant (i.e., )    Negative: [1] Having contractions or other symptoms of labor AND [2] > 36 weeks pregnant (i.e., term pregnancy)    Negative: Leakage of fluid (trickle, gush) from the vagina    Negative: Foreign body in vagina (e.g., tampon)    Negative: Pain or burning with urination is main symptom    Negative: [1] Pregnant 23 or more weeks AND [2] baby is moving less today (e.g., kick count < 5 in 1 hour or < 10 in 2 hours)    Negative: Patient sounds very sick or weak to the triager    Negative: [1] Constant abdominal pain AND [2] present > 2 hours    Negative: [1] Intermittent lower abdominal pain AND [2] present > 24 hours    Protocols used: PREGNANCY - VAGINAL [id number]      "

## 2019-05-02 NOTE — RESULT ENCOUNTER NOTE
Called to discuss results of UA and wet prep. Will wait for culture results and treat if necessary.     Hanna CONNOLLY CNM

## 2019-05-03 LAB
BACTERIA SPEC CULT: NORMAL
Lab: NORMAL
SPECIMEN SOURCE: NORMAL

## 2019-05-06 LAB
Lab: NORMAL
SPECIMEN SOURCE: NORMAL
YEAST SPEC QL CULT: NORMAL

## 2019-05-06 NOTE — RESULT ENCOUNTER NOTE
Please call the patient with the results. All of her labs were negative for infection.  Please have her monitor her symptoms and contact us if they worsen.      Thanks--   Gabriela Johnston, YRN, APRN, CNM

## 2019-05-09 ENCOUNTER — HOSPITAL ENCOUNTER (OUTPATIENT)
Facility: CLINIC | Age: 24
Discharge: HOME | End: 2019-05-09
Attending: ADVANCED PRACTICE MIDWIFE | Admitting: ADVANCED PRACTICE MIDWIFE
Payer: COMMERCIAL

## 2019-05-09 ENCOUNTER — NURSE TRIAGE (OUTPATIENT)
Dept: NURSING | Facility: CLINIC | Age: 24
End: 2019-05-09

## 2019-05-09 VITALS
WEIGHT: 145 LBS | SYSTOLIC BLOOD PRESSURE: 112 MMHG | TEMPERATURE: 98.2 F | DIASTOLIC BLOOD PRESSURE: 64 MMHG | HEIGHT: 68 IN | BODY MASS INDEX: 21.98 KG/M2 | RESPIRATION RATE: 16 BRPM

## 2019-05-09 PROBLEM — O47.00 PRETERM UTERINE CONTRACTIONS: Status: ACTIVE | Noted: 2019-05-09

## 2019-05-09 PROBLEM — O09.93 SUPERVISION OF HIGH RISK PREGNANCY IN THIRD TRIMESTER: Status: ACTIVE | Noted: 2019-03-05

## 2019-05-09 LAB
ALBUMIN UR-MCNC: NEGATIVE MG/DL
APPEARANCE UR: CLEAR
BACTERIA #/AREA URNS HPF: ABNORMAL /HPF
BILIRUB UR QL STRIP: NEGATIVE
COLOR UR AUTO: ABNORMAL
FIBRONECTIN FETAL VAG QL: NEGATIVE
GLUCOSE UR STRIP-MCNC: NEGATIVE MG/DL
HGB UR QL STRIP: NEGATIVE
KETONES UR STRIP-MCNC: 40 MG/DL
LEUKOCYTE ESTERASE UR QL STRIP: NEGATIVE
NITRATE UR QL: NEGATIVE
PH UR STRIP: 7 PH (ref 5–7)
RBC #/AREA URNS AUTO: <1 /HPF (ref 0–2)
SOURCE: ABNORMAL
SP GR UR STRIP: 1 (ref 1–1.03)
SPECIMEN SOURCE: NORMAL
SQUAMOUS #/AREA URNS AUTO: 5 /HPF (ref 0–1)
UROBILINOGEN UR STRIP-MCNC: NORMAL MG/DL (ref 0–2)
WBC #/AREA URNS AUTO: <1 /HPF (ref 0–5)
WET PREP SPEC: NORMAL

## 2019-05-09 PROCEDURE — 81001 URINALYSIS AUTO W/SCOPE: CPT | Performed by: ADVANCED PRACTICE MIDWIFE

## 2019-05-09 PROCEDURE — 59025 FETAL NON-STRESS TEST: CPT | Mod: 26 | Performed by: ADVANCED PRACTICE MIDWIFE

## 2019-05-09 PROCEDURE — 87210 SMEAR WET MOUNT SALINE/INK: CPT | Performed by: ADVANCED PRACTICE MIDWIFE

## 2019-05-09 PROCEDURE — 59025 FETAL NON-STRESS TEST: CPT

## 2019-05-09 PROCEDURE — G0463 HOSPITAL OUTPT CLINIC VISIT: HCPCS | Mod: 25

## 2019-05-09 PROCEDURE — 82731 ASSAY OF FETAL FIBRONECTIN: CPT | Performed by: ADVANCED PRACTICE MIDWIFE

## 2019-05-09 PROCEDURE — 99214 OFFICE O/P EST MOD 30 MIN: CPT | Mod: 25 | Performed by: ADVANCED PRACTICE MIDWIFE

## 2019-05-09 RX ORDER — ONDANSETRON 2 MG/ML
4 INJECTION INTRAMUSCULAR; INTRAVENOUS EVERY 6 HOURS PRN
Status: DISCONTINUED | OUTPATIENT
Start: 2019-05-09 | End: 2019-05-10 | Stop reason: HOSPADM

## 2019-05-09 ASSESSMENT — MIFFLIN-ST. JEOR: SCORE: 1456.22

## 2019-05-10 ENCOUNTER — PRENATAL OFFICE VISIT (OUTPATIENT)
Dept: MIDWIFE SERVICES | Facility: CLINIC | Age: 24
End: 2019-05-10
Payer: COMMERCIAL

## 2019-05-10 VITALS — WEIGHT: 146.4 LBS | SYSTOLIC BLOOD PRESSURE: 110 MMHG | DIASTOLIC BLOOD PRESSURE: 64 MMHG | BODY MASS INDEX: 22.26 KG/M2

## 2019-05-10 DIAGNOSIS — Z3A.29 29 WEEKS GESTATION OF PREGNANCY: ICD-10-CM

## 2019-05-10 DIAGNOSIS — O09.93 SUPERVISION OF HIGH RISK PREGNANCY IN THIRD TRIMESTER: ICD-10-CM

## 2019-05-10 DIAGNOSIS — O99.340 ANXIETY DISORDER AFFECTING PREGNANCY, ANTEPARTUM: Primary | Chronic | ICD-10-CM

## 2019-05-10 DIAGNOSIS — Z23 NEED FOR TDAP VACCINATION: ICD-10-CM

## 2019-05-10 DIAGNOSIS — F41.9 ANXIETY DISORDER AFFECTING PREGNANCY, ANTEPARTUM: Primary | Chronic | ICD-10-CM

## 2019-05-10 PROCEDURE — 90471 IMMUNIZATION ADMIN: CPT | Performed by: NURSE PRACTITIONER

## 2019-05-10 PROCEDURE — 90715 TDAP VACCINE 7 YRS/> IM: CPT | Performed by: NURSE PRACTITIONER

## 2019-05-10 PROCEDURE — 99207 ZZC PRENATAL VISIT: CPT | Performed by: NURSE PRACTITIONER

## 2019-05-10 NOTE — PROGRESS NOTES
Feels good.  States that she was in MAC last night d/t contractions.  She states that she was dehydrated, and after drinking a lot of water, contractions resolved.  UA neg for UTI, FFN neg.  Asking for prescription for breast pump  Fetal movement: positive, denies loss of fluid/vb/contractions  Tdap given: Yes  Rhogam: NA; A+  Hep C drawn: Yes  Water birth consent signed: Yes    Reviewed PTL precautions and S&S of [facility], patient verbalizes understanding and what to report  Hospital Registration reminder-online  Return to clinic 2 weeks    Madeline CONNOLLY, GENM

## 2019-05-10 NOTE — PROGRESS NOTES
"[facility] CN TRIAGE NOTE    Melina Palacios is a 23 year old  with and IUP at 29w1d who presents with complaint of contractions.     CNDONNA and Melina originally talked at . She stated she had 10 contractions in the past 85 minutes. Irregular in occurrence. Had not increased in intensity. Felt like mild period cramps. She had rested for over 1 hour, drank lots of water, emptied bladder 2x; contractions continued. FM+. Denied vaginal bleeding, LOF, abnormal vaginal discharge, malodor, painful urination. I recommended evaluation at [facility].      Problems this pregnancy:   1. Supervision of high risk pregnancy in third trimester  2. Anxiety affecting pregnancy    ROS:  Patient is alert and oriented.   Other problems listed above.    PHYSICAL EXAM:  /64   Temp 98.2  F (36.8  C) (Temporal)   Resp 16   Ht 1.727 m (5' 8\")   Wt 65.8 kg (145 lb)   LMP 10/06/2018 (Approximate)   BMI 22.05 kg/m      FHT's 150 with moderate variability  Accelerations: present   Decelerations:  absent        Contractions: Not picking up contractions. She states she is not sure if it was actually the baby moving.     Abdomen: size appropriate for dates.  Bloody show: no  Cervix: closed/ thick/ posterior; lots of thick, smooth vaginal discharge noted. (through visualization on sterile speculum exam)   Membranes are intact       ASSESSMENT :   23 year old  with trevino IUP 29w1d for evaluation of  labor.  NST  reactive  GBS unknown and membranes intact        PLAN:  We dicussed probability of contractions either not causing cervical change. Discussed differences between fetal mvmt and contractions.   FFN, wet prep, and UA sent. If FFN is positive then will order an ultrasound. If negative, she will be discharged home. She stated understanding and agreeable to plan of care.     Wet prep negative. UA WNL except for ketones. We discussed staying hydrated.     Continue routine prenatal care with " CNM - appt tomorrow.     Instructed to please refer to the discharge handouts, the RN triage line or on-call CNM for any questions or concerns.  Pt verbalizes understanding and agreement with current plan of care.    MOHSEN Smith CNM     30 minutes was spent face to face with the patient today discussing her history, diagnosis, and follow-up plan as noted above. Over 50% of the visit was spent in counseling and coordination of care.    Total Visit Time: 60 minutes.

## 2019-05-10 NOTE — TELEPHONE ENCOUNTER
"She is calling in with contractions. She is having more than the amount it says in her book they gave her in the clinic. She got up and emptied her bladder 2 x, low back pain better with rest,but not contractions. Baby is moving well. No bleeding or leaking of fluid. Page to Rohini Stout CNM to call the patient.    Bella Baltazar RN/ [facility]        Additional Information    Negative: Passed out (i.e., lost consciousness, collapsed and was not responding)    Negative: Shock suspected (e.g., cold/pale/clammy skin, too weak to stand, low BP, rapid pulse)    Negative: Difficult to awaken or acting confused (e.g., disoriented, slurred speech)    Negative: [1] SEVERE abdominal pain (e.g., excruciating) AND [2] constant AND [3] present > 1 hour    Negative: Severe bleeding (e.g., continuous red blood from vagina, or large blood clots)    Negative: Umbilical cord hanging out of the vagina (shiny, white, curled appearance, \"like telephone cord\")    Negative: Uncontrollable urge to push (i.e., feels like baby is coming out now)    Negative: Can see baby    Negative: Sounds like a life-threatening emergency to the triager    Negative: MODERATE-SEVERE abdominal pain    Negative: Contractions < 10 minutes apart for 1 hour (i.e., 6 or more contractions an hour)    Negative: [1] Contractions > 10 minutes apart AND [2] persist > 24 hours AND [3] no improvement using CARE ADVICE    Negative: [1] Contractions AND [2] any vaginal bleeding (including: red blood, clots, spotting, or pink/brown mucous)    Negative: Vaginal bleeding  (Exception: brief spotting after intercourse or pelvic exam, or slight pinkish or brownish mucous discharge)    Negative: Leakage of fluid from vagina    Negative: [1] Baby moving less today (e.g., kick count < 5 in 1 hour or < 10 in 2 hours) AND [2] pregnant 23 or more weeks    Negative: No movement of baby for 8 hours    Negative: Severe headache or headache that won't go away    " Negative: New blurred vision or vision changes    Negative: Fever > 100.4 F (38.0 C)    Negative: Increased pressure in pelvic area    Negative: [1] Lower back discomfort AND [2] not relieved by rest    Negative: Has a cerclage (i.e., a procedure where the obstetrician stitches shut the cervix)    Negative: Pinkish or brownish mucous discharge  (Exception: brief spotting after intercourse or pelvic exam)    Negative: Patient sounds very sick or weak to the triager    Negative: Baby has dropped    Negative: Increase in vaginal discharge    Negative: Diarrhea    Negative: Prior history of  labor    [1] Mild contractions AND [2] > 10 minutes apart    Protocols used: PREGNANCY - LABOR - [id number]

## 2019-05-10 NOTE — PATIENT INSTRUCTIONS
"PREECLAMPSIA SIGNS AND SYMPTOMS    Preeclampsia is a dangerous condition that some women develop in the second half of pregnancy. It can also begin after the baby is born.  Preeclampsia causes high blood pressure and can cause problems with many organ systems in your body.  It can also affect the growth of your baby. The exact cause of preeclampsia is unknown, however, there are signs and symptoms to watch for:    -A bad headache that doesn't improve with Tylenol  -Visual changes such as spots, flashes of light, blurry vision  -Pain in the upper right part of your abdomen, especially under the ribs that doesn't go away  -Nausea and/or vomiting  -Feeling extremely tired  -Yellowing of the skin and/or eyes  -Feeling \"not quite right\" or that something is wrong  -An extreme amount of swelling (some swelling in pregnancy is very normal)    If your midwife feels that you are developing preeclampsia, you will have lab tests drawn and will be monitored very closely.     If you are experiencing anyof these symptoms, call the [facility] immediately at [phone].  SIGNS OF  LABOR    Labor is  if it happens more than three weeks before your due date.    It can be hard to know if you are in labor, since the symptoms can be like the normal feelings of pregnancy.  Often, the only difference is the symptoms increase or they don't go away.     Signs of  labor can include:      Contractions which can feel like period cramps or gas pain.  You may feel it in the lower part of your abdomen, in your back, or as a pressure feeling in your bottom.  It is often regular, coming every 5 or 10 minutes, and  lasting about 30-60 seconds. Some contractions are normal during pregnancy (Francisco Javier proctor contractions) but if you are feeling more than 5-6 in one hour that is NOT normal    If this occurs empty your bladder, then drink 2-3 glasses of water, eat a snack, and lay down on your left side. Put your hand " on your abdomen to count the contractions.  If after one hour of resting you have still had 5-6 contractions call your clinic right away.      If you feel a pop, gush, or trickle of fluid it may mean that your bag of water has broken and you should contact the clinic       You may also experience loose stools and/or rectal pressure       Listen to your body, if something doesn't seem right please call us at the clinic    Risk Factors      Previous  delivery    Bacterial Vaginosis- if you notice a fishy smell to your discharge or experience vaginal itching/discomfort you should be evaluated for infection    Smoking    Drug abuse    Adolescent (teen) pregnancy or advanced maternal age (AMA) age 35 and over    Dehydration (this may not cause  labor but it can cause contractions)    If you think you are in  labor we may do some lab testing in the clinic or send you to the hospital for evaluation    Please call us if you are concerned you are in  labor.    [facility]  [phone]

## 2019-05-10 NOTE — PLAN OF CARE
Primip in MAC with c/o contractions since 1900.  Pt states they have been irregular Q 1-9 minutes.  Pt denies vaginal bleeding or discharge.  Baby actively moving.  Discussed POC.  Pt wishes to proceed.  Monitors applied and history obtained.

## 2019-05-10 NOTE — NURSING NOTE
Screening Questionnaire for Adult Immunization    Are you sick today?   No   Do you have allergies to medications, food, a vaccine component or latex?   Amoxicillin & Azithromycin   Have you ever had a serious reaction after receiving a vaccination?   No   Do you have a long-term health problem with heart disease, lung disease, asthma, kidney disease, metabolic disease (e.g. diabetes), anemia, or other blood disorder?   No   Do you have cancer, leukemia, HIV/AIDS, or any other immune system problem?   No   In the past 3 months, have you taken medications that affect  your immune system, such as prednisone, other steroids, or anticancer drugs; drugs for the treatment of rheumatoid arthritis, Crohn s disease, or psoriasis; or have you had radiation treatments?   No   Have you had a seizure, or a brain or other nervous system problem?   No   During the past year, have you received a transfusion of blood or blood     products, or been given immune (gamma) globulin or antiviral drug?   No   For women: Are you pregnant or is there a chance you could become        pregnant during the next month?   Yes   Have you received any vaccinations in the past 4 weeks?   No     Immunization questionnaire was positive for at least one answer.  Midwife aware.        Per orders of Madeline Sandra CNM, injection of Tdap given by Camille Lainez. Patient instructed to remain in clinic for 15 minutes afterwards, and to report any adverse reaction to me immediately.       Screening performed by Camille Lainez on 5/10/2019 at 9:43 AM.

## 2019-05-10 NOTE — PLAN OF CARE
Data: [facility] .   Reason for maternal/fetal assessment per patient is Contractions  . Patient is a . Prenatal record reviewed.      OB History    Para Term  AB Living   1 0 0 0 0 0   SAB TAB Ectopic Multiple Live Births   0 0 0 0 0      # Outcome Date GA Lbr Allan/2nd Weight Sex Delivery Anes PTL Lv   1 Current               Medical History:   Past Medical History:   Diagnosis Date     [place] (generalized anxiety disorder)      IBS (irritable bowel syndrome)      Irregular menses      Ovarian cyst    . Gestational Age 29w1d. VSS. Cervix: posterior, closed and long per spec exam by CNM.  Fetal movement present. Patient denies cramping, backache, vaginal discharge, pelvic pressure, UTI symptoms, GI problems, bloody show, vaginal bleeding, edema, headache, visual disturbances, epigastric or URQ pain, abdominal pain, rupture of membranes. Support persons Kevin present.  Action: Verbal consent for EFM. Triage assessment completed. EFM applied for NST. Uterine assessment occas irritability type contractions which pt denies feeling. Fetal assessment: Presumed adequate fetal oxygenation documented (see flow record). Patient education given on fetal movement reviewed. Patient instructed to report change in fetal movement, vaginal leaking of fluid or bleeding, abdominal pain, or any concerns related to the pregnancy to her nurse/physician.   Response: DEJON Stout CNM seen and examined pt in MAC and informed of UA/fFn results. Plan per provider is discharge to home and pt to keep office appt tomorrow. Patient verbalized understanding of education and verbalized agreement with plan. Discharged ambulatory at 2244.

## 2019-05-10 NOTE — DISCHARGE INSTRUCTIONS
Discharge Instruction for Undelivered Patients      You were seen for: Labor Assessment  We Consulted: DEJON Stout CNM  You had (Test or Medicine):non stress test monitoring, vaginal speculum exam (cervix closed and long), fetal fibronectin test (neg) , urinalysis (moderate amount ketones, other wise neg), wet prep (neg)    Diet:   Drink 8 to 12 glasses of liquids (milk, juice, water) every day.  You may eat meals and snacks.     Activity:  Call your doctor or nurse midwife if your baby is moving less than usual.     Call your provider if you notice:  Pre-eclampsia symptoms:        Swelling in your face or increased swelling in your hands or legs.        Headaches that are not relieved by Tylenol (acetaminophen).        Changes in your vision (blurring: seeing spots or stars.)        Nausea (sick to your stomach) and vomiting (throwing up).         Weight gain of 5 pounds or more per week.        Heartburn that doesn't go away.  Signs of bladder infection: pain when you urinate (use the toilet), need to go more often and more urgently.  The bag of diaz (rupture of membranes) breaks, or you notice leaking in your underwear.  Bright red blood in your underwear.  Abdominal (lower belly) or stomach pain.  For first baby: Contractions (tightening) less than 5 minutes apart for one hour or more.  *If less than 34 weeks: Contractions (tightenings) more than 6 times in one hour.  Increase or change in vaginal discharge (note the color and amount)    Follow-up:  As scheduled in the clinic tomorrow

## 2019-05-14 ENCOUNTER — TELEPHONE (OUTPATIENT)
Dept: MIDWIFE SERVICES | Facility: CLINIC | Age: 24
End: 2019-05-14

## 2019-05-14 NOTE — TELEPHONE ENCOUNTER
Pt is concerned about baby having hiccups reassured her that this is normal. No further questions or concerns.  Carmen Edward RN on 5/14/2019 at 4:56 PM

## 2019-05-19 ENCOUNTER — NURSE TRIAGE (OUTPATIENT)
Dept: NURSING | Facility: CLINIC | Age: 24
End: 2019-05-19

## 2019-05-20 ENCOUNTER — TELEPHONE (OUTPATIENT)
Dept: MIDWIFE SERVICES | Facility: CLINIC | Age: 24
End: 2019-05-20

## 2019-05-20 NOTE — TELEPHONE ENCOUNTER
"Call back to Melina after page by FNA. Melina reports feeling short of breath on and off for last 4 days. Usually feels better when standing, but states lying down makes it go away, too. Prefers the L lateral side, but states feeling SOB now on left side for last 45 minutes along with feeling lightheaded and having some nausea. Denies any chest pain, no recent sickness, no hx of asthma, positive FM, no ctxs. Has \"sore\" back, unchanged from previously. Denies any correlation between activity and feeling though SOB is better or worse. Feels she is properly hydrated.    Discussed normal physiological changes in pregnancy which can contribute to SOB.     Discussed comfort measures, specifically trying different positions when lying down and using pillows to prop self up. Advised against lying flat.     Warning s/sx reviewed and when to call back. Keep next scheduled prenatal visit or sooner if concerns arise.    Hanna CONNOLLY CNM    "

## 2019-05-20 NOTE — TELEPHONE ENCOUNTER
"Caller: self  Reason for call: \"I'm about 30 weeks pregnant, for the past few days, but especially today, I've been having SOB episodically, and I usually change positions and it helps, but that's not helping now, is this something I should worry about? Is this normal? I can't get a deep breath in.\"  Symptoms: SOB episodes (x 4-5 episodes today, even at rest), mild headache, lightheaded, mild nausea, upper left back pain (cramp like, constant,  2/10)  Symptoms started: 4 days ago, worsening   Denies hx of asthma, passing out, fever, chest pain, vision changes, hand/face swelling, urinary symptoms  SOPHIA: 19, 30w4d,    Vaginal fluids, bleeding or unusual discharge? denies    Changes in fetal movement? denies  Abdominal Pain? denies   Home cares tried: laying down, changing positions  Emergent symptoms reviewed.  Care advice given per triage protocol. Triage Disposition: advised caller to speak to the on call CNM now for 2nd level triage.     Caller verbalized understanding of care advice given and plans to speak to the on call CNM now. Caller had no further questions.     This nurse paged the on call provider (Hanna Butts CNM) [facility] at 11:53pm via smart web to call the patient back directly at [phone] for 2nd level triage. Caller advised to call FNA back if no call from provider within 20-30 minutes. Caller agreed.    Alexia Tuttle RN  [facility]  [place] message:   patient: Melina Palacios  : 1995  Call back # [phone], Pt  Syn: 23 yr old, 30w4d, SOPHIA 19, , int. SOB episodes x 4 days, worsening, needs 2nd level triage  OB: Aristides Hale RN/MARSHA    Reason for Disposition    [1] MODERATE difficulty breathing (e.g., speaks in phrases, SOB even at rest, pulse 100-120) AND [2] NEW-onset or WORSE than normal    Additional Information    Negative: [1] Breathing stopped AND [2] hasn't returned    Negative: Choking on something    Negative: Severe " "difficulty breathing (e.g., struggling for each breath, speaks in single words)    Negative: Bluish (or gray) lips or face now    Negative: Difficult to awaken or acting confused (e.g., disoriented, slurred speech)    Negative: Passed out (i.e., lost consciousness, collapsed and was not responding)    Negative: Wheezing started suddenly after medicine, an allergic food or bee sting    Negative: Stridor    Negative: Slow, shallow and weak breathing    Negative: Sounds like a life-threatening emergency to the triager    Negative: Chest pain    Negative: [1] Wheezing (high pitched whistling sound) AND [2] previous asthma attacks or use of asthma medicines    Negative: [1] Difficulty breathing AND [2] only present when coughing    Negative: [1] Difficulty breathing AND [2] only from stuffy or runny nose    Answer Assessment - Initial Assessment Questions  1. RESPIRATORY STATUS: \"Describe your breathing?\" (e.g., wheezing, shortness of breath, unable to speak, severe coughing)       SOB  2. ONSET: \"When did this breathing problem begin?\"       4 days ago  3. PATTERN \"Does the difficult breathing come and go, or has it been constant since it started?\"       Comes and goes  4. SEVERITY: \"How bad is your breathing?\" (e.g., mild, moderate, severe)     - MILD: No SOB at rest, mild SOB with walking, speaks normally in sentences, can lay down, no retractions, pulse < 100.     - MODERATE: SOB at rest, SOB with minimal exertion and prefers to sit, cannot lie down flat, speaks in phrases, mild retractions, audible wheezing, pulse 100-120.     - SEVERE: Very SOB at rest, speaks in single words, struggling to breathe, sitting hunched forward, retractions, pulse > 120       Mild/moderate  5. RECURRENT SYMPTOM: \"Have you had difficulty breathing before?\" If so, ask: \"When was the last time?\" and \"What happened that time?\"       denies  6. CARDIAC HISTORY: \"Do you have any history of heart disease?\" (e.g., heart attack, angina, bypass " "surgery, angioplasty)       denies  7. LUNG HISTORY: \"Do you have any history of lung disease?\"  (e.g., pulmonary embolus, asthma, emphysema)      denies  8. CAUSE: \"What do you think is causing the breathing problem?\"       Pregnancy?  9. OTHER SYMPTOMS: \"Do you have any other symptoms? (e.g., dizziness, runny nose, cough, chest pain, fever)      Nausea, lightheaded, headache, upper left back pain  10. PREGNANCY: \"Is there any chance you are pregnant?\" \"When was your last menstrual period?\"        Yes,   11. TRAVEL: \"Have you traveled out of the country in the last month?\" (e.g., travel history, exposures)        Didn't report    Protocols used: BREATHING DIFFICULTY-A-AH      "

## 2019-05-23 ENCOUNTER — PRENATAL OFFICE VISIT (OUTPATIENT)
Dept: MIDWIFE SERVICES | Facility: CLINIC | Age: 24
End: 2019-05-23
Payer: COMMERCIAL

## 2019-05-23 VITALS — BODY MASS INDEX: 22.66 KG/M2 | WEIGHT: 149 LBS | DIASTOLIC BLOOD PRESSURE: 60 MMHG | SYSTOLIC BLOOD PRESSURE: 112 MMHG

## 2019-05-23 DIAGNOSIS — F41.9 ANXIETY DISORDER AFFECTING PREGNANCY, ANTEPARTUM: Chronic | ICD-10-CM

## 2019-05-23 DIAGNOSIS — Z3A.31 31 WEEKS GESTATION OF PREGNANCY: ICD-10-CM

## 2019-05-23 DIAGNOSIS — O99.340 ANXIETY DISORDER AFFECTING PREGNANCY, ANTEPARTUM: Chronic | ICD-10-CM

## 2019-05-23 DIAGNOSIS — O09.93 SUPERVISION OF HIGH RISK PREGNANCY IN THIRD TRIMESTER: Primary | ICD-10-CM

## 2019-05-23 PROCEDURE — 99207 ZZC PRENATAL VISIT: CPT | Performed by: ADVANCED PRACTICE MIDWIFE

## 2019-06-11 ENCOUNTER — NURSE TRIAGE (OUTPATIENT)
Dept: NURSING | Facility: CLINIC | Age: 24
End: 2019-06-11

## 2019-06-11 ENCOUNTER — PRENATAL OFFICE VISIT (OUTPATIENT)
Dept: MIDWIFE SERVICES | Facility: CLINIC | Age: 24
End: 2019-06-11
Payer: COMMERCIAL

## 2019-06-11 VITALS — BODY MASS INDEX: 22.81 KG/M2 | DIASTOLIC BLOOD PRESSURE: 60 MMHG | SYSTOLIC BLOOD PRESSURE: 104 MMHG | WEIGHT: 150 LBS

## 2019-06-11 DIAGNOSIS — O99.340 ANXIETY DISORDER AFFECTING PREGNANCY, ANTEPARTUM: Primary | Chronic | ICD-10-CM

## 2019-06-11 DIAGNOSIS — Z3A.33 33 WEEKS GESTATION OF PREGNANCY: ICD-10-CM

## 2019-06-11 DIAGNOSIS — O09.93 SUPERVISION OF HIGH RISK PREGNANCY IN THIRD TRIMESTER: ICD-10-CM

## 2019-06-11 DIAGNOSIS — F41.9 ANXIETY DISORDER AFFECTING PREGNANCY, ANTEPARTUM: Primary | Chronic | ICD-10-CM

## 2019-06-11 PROBLEM — O47.00 PRETERM UTERINE CONTRACTIONS: Status: RESOLVED | Noted: 2019-05-09 | Resolved: 2019-06-11

## 2019-06-11 PROCEDURE — 99207 ZZC PRENATAL VISIT: CPT | Performed by: NURSE PRACTITIONER

## 2019-06-11 NOTE — PROGRESS NOTES
"Feels \"OK\".  States that she woke up with a headache today, describes as mild and located in temples.  Denies any vision changes, RUQ pain, N/V; BP normal today.  Fetal Movement: positive, denies loss of fluid/vb, a little  Contractions that resolve with rest/hydration  Fetal kick counts/movement reviewed    Reviewed PTL precautions and s/sx of preeclampsia; denies any S&S and aware of what to report   Taking hospital tour?  Yes  Have car seat Yes  Discussed GBS/cx check at next visit  Counseled on safe OTC medications, go home, take Tylenol, drink some caffeine, rest.  If no improvement in HA, call clinic back.  Return to clinic 2 weeks OB visit    Madeline CONNOLLY CNM        "

## 2019-06-12 NOTE — TELEPHONE ENCOUNTER
"Patient calling reporting having eaten a ground beef that was \"slightly pink\". Patient is 33w6d pregnant and worried about food poisoning. Denies vomiting and diarrhea. Denies fever. Complaining of mild headache and rates it at 2/10. Per guideline, advised patient to be seen within 24 hours. Caller verbalized understanding. Denies further questions.      Ish Calzada RN  [facility]       Reason for Disposition    [1] MILD-MODERATE headache AND [2] present > 72 hours    Additional Information    Negative: Difficult to awaken or acting confused (e.g., disoriented, slurred speech)    Negative: [1] Weakness of the face, arm or leg on one side of the body AND [2] new onset    Negative: [1] Numbness of the face, arm or leg on one side of the body AND [2] new onset    Negative: [1] Loss of speech or garbled speech AND [2] new onset    Negative: Sounds like a life-threatening emergency to the triager    Negative: Unable to walk, or can only walk with assistance (e.g., requires support)    Negative: Stiff neck (can't touch chin to chest)    Negative: Severe pain in one eye    Negative: [1] Other family members (or roommates) with headaches AND [2] possibility of carbon monoxide exposure    Negative: [1] SEVERE headache (e.g., excruciating) AND [2] \"worst headache\" of life    Negative: [1] SEVERE headache AND [2] sudden-onset (i.e., reaching maximum intensity within seconds)    Negative: [1] SEVERE headache AND [2] fever    Negative: Loss of vision or double vision (Exception: similar to previous migraines)    Negative: [1] Fever > 100.0 F (37.8 C) AND [2] diabetes mellitus or weak immune system (e.g., HIV positive, cancer chemo, splenectomy, chronic steroids)    Negative: Patient sounds very sick or weak to the triager    Negative: [1] Pregnant > 20 weeks AND [2] new hand or face swelling    Negative: [1] Pregnant > 20 weeks AND [2] new blurred vision or vision changes    Negative: [1] Pregnant > 20 weeks AND " [2] sudden weight gain (i.e., more than 3 lbs or 1.4 kg in one week)    Negative: [1] Pregnant 23 or more weeks AND [2] baby is moving less today (e.g., kick count < 5 in 1 hour or < 10 in 2 hours)    Negative: [1] SEVERE headache (e.g., excruciating) AND [2] not improved after 2 hours of pain medicine (Exception: similar to previous migraines)    Negative: [1] Vomiting AND [2] 2 or more times (Exception: similar to previous migraines)    Negative: Fever > 104 F (40 C)    Negative: [1] MODERATE headache (e.g., interferes with normal activities) AND [2] present > 24 hours AND [3] unexplained  (Exceptions: analgesics not tried, typical migraine, or headache part of viral illness)    Negative: [1] New headache AND [2] HIV positive    Negative: [1] Sinus pain of forehead AND [2] yellow or green nasal discharge    Negative: Fever present > 3 days (72 hours)    Protocols used: PREGNANCY - HEADACHE-A-AH

## 2019-06-16 ENCOUNTER — TELEPHONE (OUTPATIENT)
Dept: OBGYN | Facility: CLINIC | Age: 24
End: 2019-06-16

## 2019-06-16 ENCOUNTER — NURSE TRIAGE (OUTPATIENT)
Dept: NURSING | Facility: CLINIC | Age: 24
End: 2019-06-16

## 2019-06-16 NOTE — TELEPHONE ENCOUNTER
Patient called CNM on call. Went for a walk this afternoon. Reports that she usually gets some [place] contractions after walking that go away with rest. Today the contractions are not resolving with rest. States that they are not painful, and are not getting closer together. Advised patient drink 2 tall glasses of water and try a shower or bath. Also advised patient to call me back if contractions are not resolved in one hour. Patient verbalizes understanding.

## 2019-06-16 NOTE — TELEPHONE ENCOUNTER
See previous note. Pt still awaiting call from [facility] on-call after 30 min. Confirmed pt ph# [phone]. No call blockers. Pt condition is same. 2nd page sent by call to [facility]  @6:24pm to call pt at above ph#  Advised pt again c/b if no call in 20-30 min. Pt voiced understanding and agreement.

## 2019-06-16 NOTE — TELEPHONE ENCOUNTER
"Melina reports she is almost 35 weeks pregnant. Having contractions every 5-10 minutes for 2 hours. Describes as \"mild discomfort.\"    FNA paged on call provider for [facility] , Dilia Sanchez CNM via smart web at 5:40 pm to call patient back directly at [phone].    FNA advised patient to phone back FNA in 20 minutes if no response from on call MD and patient agreed.    Celia Cuevas RN/[facility]    Reason for Disposition    Contractions < 10 minutes apart for 1 hour (i.e., 6 or more contractions an hour)    Additional Information    Negative: Passed out (i.e., lost consciousness, collapsed and was not responding)    Negative: Shock suspected (e.g., cold/pale/clammy skin, too weak to stand, low BP, rapid pulse)    Negative: Difficult to awaken or acting confused (e.g., disoriented, slurred speech)    Negative: [1] SEVERE abdominal pain (e.g., excruciating) AND [2] constant AND [3] present > 1 hour    Negative: Severe bleeding (e.g., continuous red blood from vagina, or large blood clots)    Negative: Umbilical cord hanging out of the vagina (shiny, white, curled appearance, \"like telephone cord\")    Negative: Uncontrollable urge to push (i.e., feels like baby is coming out now)    Negative: Can see baby    Negative: Sounds like a life-threatening emergency to the triager    Negative: MODERATE-SEVERE abdominal pain    Protocols used: PREGNANCY - LABOR - [id number]      "

## 2019-06-21 ENCOUNTER — TELEPHONE (OUTPATIENT)
Dept: MIDWIFE SERVICES | Facility: CLINIC | Age: 24
End: 2019-06-21

## 2019-06-21 NOTE — TELEPHONE ENCOUNTER
"Pt  35 w 2d calling to report for the last several days that she has been having scalp itching and the bottoms of her feet itching. Does notice during the day but is worse at night. Does not notice any type of rash or redness- Just \"horrible itching\"  No other symptoms noted.    Will send to MW to advise-    "

## 2019-06-21 NOTE — TELEPHONE ENCOUNTER
Called patient to discuss itching. She reports it is mainly her scalp but kind of all over. Report seasonal allergies that cause itching but usually don't occur until later on in the summer. Normal lotion is not helping. Reports good fetal movement and overall feeling well. Has appointment Tuesday.    Discussed may try benadryl or other allergy med tonight for sleep. Head and shoulders for itchy scalp. Benadryl lotion or hydrocortisone cream for itchy skin. Reviewed s/sx of cholestasis and typically involve itchy palms and feet and not all over, also reviewed s/sx of PUPPs. Discussed if remedies do not work we can consider doing r/o cholestasis labs this weekend at outpatient lab. Patient to call CNM on call this weekend if no improvement.    MOHSEN Boo, SANDEEP

## 2019-06-25 ENCOUNTER — PRENATAL OFFICE VISIT (OUTPATIENT)
Dept: MIDWIFE SERVICES | Facility: CLINIC | Age: 24
End: 2019-06-25
Payer: COMMERCIAL

## 2019-06-25 VITALS — BODY MASS INDEX: 22.93 KG/M2 | WEIGHT: 150.8 LBS | SYSTOLIC BLOOD PRESSURE: 112 MMHG | DIASTOLIC BLOOD PRESSURE: 64 MMHG

## 2019-06-25 DIAGNOSIS — Z36.85 SCREENING, ANTENATAL, FOR STREPTOCOCCUS B: ICD-10-CM

## 2019-06-25 DIAGNOSIS — O99.340 ANXIETY DISORDER AFFECTING PREGNANCY, ANTEPARTUM: Chronic | ICD-10-CM

## 2019-06-25 DIAGNOSIS — O09.93 SUPERVISION OF HIGH RISK PREGNANCY IN THIRD TRIMESTER: Primary | ICD-10-CM

## 2019-06-25 DIAGNOSIS — F41.9 ANXIETY DISORDER AFFECTING PREGNANCY, ANTEPARTUM: Chronic | ICD-10-CM

## 2019-06-25 DIAGNOSIS — Z3A.35 35 WEEKS GESTATION OF PREGNANCY: ICD-10-CM

## 2019-06-25 PROCEDURE — 99207 ZZC PRENATAL VISIT: CPT | Performed by: ADVANCED PRACTICE MIDWIFE

## 2019-06-25 PROCEDURE — 87653 STREP B DNA AMP PROBE: CPT | Performed by: ADVANCED PRACTICE MIDWIFE

## 2019-06-25 NOTE — PROGRESS NOTES
Feels well overall. Here alone today.  Fetal movement: positive  Denies bleeding/lof, some contractions  GBS swab done today  Bedside ultrasound confirmed vertex positioning.   Labor instructions given    Warning signs reviewed  Patient denies S&S of pre-eclampsia and aware of what to report   Return to clinic 1 week    Rohini Stout, DNP, APRN, CNM

## 2019-06-26 LAB
GP B STREP DNA SPEC QL NAA+PROBE: NEGATIVE
SPECIMEN SOURCE: NORMAL

## 2019-06-26 NOTE — RESULT ENCOUNTER NOTE
Please call Melina and inform her that her GBS culture was negative.  Thanks!    Madeline CONNOLLY CNM

## 2019-06-30 ENCOUNTER — NURSE TRIAGE (OUTPATIENT)
Dept: NURSING | Facility: CLINIC | Age: 24
End: 2019-06-30

## 2019-06-30 NOTE — TELEPHONE ENCOUNTER
Patient is about 37 weeks pregnant; SOPHIA: 7/24, seen by midwives and expected to deliver at [facility].  Patient noticed decreased fetal movement since this morning.  FNA advised caller to phone back FNA in 20-30 minutes if no response from on call provider and patient agreed.  Paged on-call provider, Rohini Renee, at 1:23 pm via page  to call patient back re: decreased fetal movement.      Reason for Disposition    [1] Pregnant 23 or more weeks AND [2] baby moving less today by kick count  (e.g., kick count < 5 in 1 hour or < 10 in 2 hours)    Additional Information    Negative: Sounds like a life-threatening emergency to the triager    Negative: Injury to abdomen    Negative: [1] Pregnant > 36 weeks AND [2] having contractions or other symptoms of labor    Negative: [1] Pregnant < 37 weeks AND [2] having contractions or other symptoms of labor    Negative: [1] Pregnant > 20 weeks AND [2] abdominal pain    Negative: [1] Pregnant > 20 weeks AND [2] vaginal bleeding or spotting    Negative: New blurred vision or vision changes    Negative: [1] SEVERE headache AND [2] not relieved with acetaminophen (e.g., Tylenol)    Negative: Leakage of fluid from vagina    Protocols used: PREGNANCY - DECREASED FETAL MOVEMENT-A-AH

## 2019-07-02 ENCOUNTER — PRENATAL OFFICE VISIT (OUTPATIENT)
Dept: MIDWIFE SERVICES | Facility: CLINIC | Age: 24
End: 2019-07-02
Payer: COMMERCIAL

## 2019-07-02 VITALS — BODY MASS INDEX: 23.11 KG/M2 | DIASTOLIC BLOOD PRESSURE: 58 MMHG | SYSTOLIC BLOOD PRESSURE: 106 MMHG | WEIGHT: 152 LBS

## 2019-07-02 DIAGNOSIS — O99.340 ANXIETY DISORDER AFFECTING PREGNANCY, ANTEPARTUM: Chronic | ICD-10-CM

## 2019-07-02 DIAGNOSIS — F41.9 ANXIETY DISORDER AFFECTING PREGNANCY, ANTEPARTUM: Chronic | ICD-10-CM

## 2019-07-02 DIAGNOSIS — O09.93 SUPERVISION OF HIGH RISK PREGNANCY IN THIRD TRIMESTER: Primary | ICD-10-CM

## 2019-07-02 DIAGNOSIS — Z3A.36 36 WEEKS GESTATION OF PREGNANCY: ICD-10-CM

## 2019-07-02 PROCEDURE — 99207 ZZC PRENATAL VISIT: CPT | Performed by: ADVANCED PRACTICE MIDWIFE

## 2019-07-02 NOTE — PATIENT INSTRUCTIONS
"NATURAL LABOR PREPARATION    So, you're getting closer and closer to your due date and wondering what you can do to help get your body ready for labor.   Here are some natural methods you can try:    NOTE: DO NOT begin any of the following prior to 36 completed weeks of pregnancy!    Evening Primrose Oil: Take 1000mg by mouth three times per day. This encourages the cervix to ripen. Cervical ripening is when your cervix becomes more soft and stretchy to get ready for dilation and effacement.     Red Raspberry Leaf Tea: Red raspberry tea (get it at a Co-op or in the grocery store). Drink three cups per day (hot or cold). This can help to prepare the uterine muscles for labor.    Labor Instructions for Midwife Patients    When to call:  Both during and after office hours call  [phone]. There is a triage RN to take your calls and answer your questions 24 hours a day.  If she cannot answer your question she will page the midwife on call for you.    When to call:  Call anytime you have important concerns about you or your baby.     Call if:    You are having contractions at regular intervals about 5-6 minutes apart lasting 30-60 seconds and becoming increasingly more intense     You have an uncontrollable gush of fluid from your vagina or feel a pop and gush like your water has broken    You have HEAVY bleeding, like heavy period, blood running down your legs, or  soaking a pad.     Some bleeding after a pelvic exam, after intercourse, or in labor when your cervix is dilating is normal and is referred to as \"bloody show\"    You have severe, continuous back or abdominal pain    You feel it is time to go to the hospital    If this is your first labor, call when contractions are very intense and have been about every 3-4 minutes for about an hour    If it is your second labor or more, call when contractions are strong and about every 3-5 minutes or sooner depending on your level of discomfort.     Keep in mind we are " always here for you! If you have questions, concerns please don't hesitate to call us.     What to eat/drink in labor: Drink plenty of fluid (water most importantly, juice, soda or tea without caffeine). Eat rice, pasta, soup, cereal, bread/toast, and fruit. Avoid dairy and greasy food as they are difficult to digest and you may experience some nausea during labor.    Comfort measures:    Baths and showers (ok even with ruptured membranes, it may temporarily slow contractions if you are still in the early stage of labor)    Warm/hot packs for back pain or discomfort    Back, belly, or thigh massages    Standing, rocking, walking, leaning over bed or tables, side-lying and sleeping    Miscellaneous:     Contractions are timed from the beginning of one to the beginning of the next    Try hard to sleep during the early stage of labor when you are not that uncomfortable. Timing of contractions at this point is not important    Even if you cannot sleep, resting in bed or on the couch can help you maintain your energy for labor    When you arrive at the hospital the nurse will check your baby's heartbeat, check your cervix, and will call us. The midwife on call will come in and be with you when you are in active labor    After hours you need to enter the hospital through the emergency room    PREECLAMPSIA SIGNS AND SYMPTOMS    Preeclampsia is a dangerous condition that some women develop in the second half of pregnancy. It can also begin after the baby is born.  Preeclampsia causes high blood pressure and can cause problems with many organ systems in your body.  It can also affect the growth of your baby. The exact cause of preeclampsia is unknown, however, there are signs and symptoms to watch for:    -A bad headache that doesn't improve with Tylenol  -Visual changes such as spots, flashes of light, blurry vision  -Pain in the upper right part of your abdomen, especially under the ribs that doesn't go away  -Nausea  "and/or vomiting  -Feeling extremely tired  -Yellowing of the skin and/or eyes  -Feeling \"not quite right\" or that something is wrong  -An extreme amount of swelling (some swelling in pregnancy is very normal)    If your midwife feels that you are developing preeclampsia, you will have lab tests drawn and will be monitored very closely.     If you are experiencing anyof these symptoms, call the [facility] immediately at [phone].    Fetal Kick Counts    It is important to know when your baby's movements occur. We often get busy with work and life and do not pay close attention to their movements.        Women typically begin feeling movement between 18-22 weeks of gestation, sometimes it can be earlier or later depending on where your placenta is       Movements usually begin feeling like popping or fluttering and as the baby grows they become more pronounced    Toward the end of pregnancy as the baby gets larger they may not move as much or make as big of movements. Babies have maturing sleep cycles as well as not as much room to move and flip. If you are ever concerned about your baby's movements or have not felt the baby move for a while, we recommend you do a fetal kick count. Prior to starting your count drink a glass of water or juice and eat a snack. Then lay down on your side and begin to count movements.     How to do a Fetal Kick Counts    There are many different ways to monitor your baby's movements. Movements can range from large jabs to small kicks, or wiggles.  Hiccups count!      Count 10 movements in 2 hours when resting and focusing    Count 10 movements in 12 hours when doing normal activity    We recommend that if movements occur but seem decreased that you should be seen in the clinic or hospital for evaluation within 12 hours. If fetal movement is absent or fetal kick counts are low please contact us right away.    If you ever have any concerns about your baby's movements DO " NOT HESITATE to call us, we are here for you!    [facility]  [phone]

## 2019-07-02 NOTE — PROGRESS NOTES
Feels well overall.   Called over the weekend due to decreased fetal mvmt (mvmt was less dramatic). After drinking Gatorade, movement improved. Still states she is not feeling dramatic movement. NST ordered.  Fetal movement: positive  Denies vaginal bleeding/lof, Not feeling contractions    Fetal Non-Stress Test     Fetal heart tones:   Baseline 130  Variability: moderate  Accelerations: Present  Decelerations:  None  Category 1 fetal heart rate tracing    NST Interpretation: reactive    Warning signs reviewed  Labor signs and symptoms discussed, aware of numbers to call  Denies s/sx of preeclampsia and aware of what to report  Return to clinic 1 week    Rohini Stout, DNP, APRN, CNM

## 2019-07-02 NOTE — NURSING NOTE
NST INTERPRETATION    Baseline Rate: 130  Accelerations: 150  Decelerations: none  Reactive:  Reactive per Rohini Stout.     Pt states baby has not been as active as usuall. FM applied. Explanation of FM and NST given.   Strip reviewed by Rohini Stout. Reactive. Pt discharged to home in stable condition.

## 2019-07-05 ENCOUNTER — TELEPHONE (OUTPATIENT)
Dept: MIDWIFE SERVICES | Facility: CLINIC | Age: 24
End: 2019-07-05

## 2019-07-05 NOTE — TELEPHONE ENCOUNTER
Patient 38 weeks, has quesitons about having pest treatment spraying done at her home while pregnant.

## 2019-07-05 NOTE — TELEPHONE ENCOUNTER
Returned pt call  Noriega ants in attic and having home sprayed outside and in attic only. Nothing in living quarters.  Asking if safe to have done at 37w2d    Reassured pt it will be ok. If she notices a fume smell in the home, she can leave the home for a few hours. Leave during spraying.  Better to have done before delivery than baby in home.  Ensure good ventilation and avoid direct contact with chemical.    Pt verbalized understanding, in agreement with plan, and voiced no further questions.

## 2019-07-06 ENCOUNTER — NURSE TRIAGE (OUTPATIENT)
Dept: NURSING | Facility: CLINIC | Age: 24
End: 2019-07-06

## 2019-07-06 ENCOUNTER — TELEPHONE (OUTPATIENT)
Dept: MIDWIFE SERVICES | Facility: CLINIC | Age: 24
End: 2019-07-06

## 2019-07-06 ENCOUNTER — HOSPITAL ENCOUNTER (OUTPATIENT)
Facility: CLINIC | Age: 24
Discharge: HOME | End: 2019-07-06
Attending: ADVANCED PRACTICE MIDWIFE | Admitting: ADVANCED PRACTICE MIDWIFE
Payer: COMMERCIAL

## 2019-07-06 VITALS — SYSTOLIC BLOOD PRESSURE: 116 MMHG | DIASTOLIC BLOOD PRESSURE: 75 MMHG | TEMPERATURE: 97.9 F

## 2019-07-06 PROBLEM — I10 HYPERTENSION: Status: ACTIVE | Noted: 2019-07-06

## 2019-07-06 LAB
ALBUMIN SERPL-MCNC: 2.7 G/DL (ref 3.4–5)
ALP SERPL-CCNC: 203 U/L (ref 40–150)
ALT SERPL W P-5'-P-CCNC: 19 U/L (ref 0–50)
ANION GAP SERPL CALCULATED.3IONS-SCNC: 8 MMOL/L (ref 3–14)
AST SERPL W P-5'-P-CCNC: 18 U/L (ref 0–45)
BILIRUB SERPL-MCNC: 0.4 MG/DL (ref 0.2–1.3)
BUN SERPL-MCNC: 5 MG/DL (ref 7–30)
CALCIUM SERPL-MCNC: 9 MG/DL (ref 8.5–10.1)
CHLORIDE SERPL-SCNC: 108 MMOL/L (ref 94–109)
CO2 SERPL-SCNC: 24 MMOL/L (ref 20–32)
CREAT SERPL-MCNC: 0.48 MG/DL (ref 0.52–1.04)
CREAT UR-MCNC: 9 MG/DL
ERYTHROCYTE [DISTWIDTH] IN BLOOD BY AUTOMATED COUNT: 13.2 % (ref 10–15)
GFR SERPL CREATININE-BSD FRML MDRD: >90 ML/MIN/{1.73_M2}
GLUCOSE SERPL-MCNC: 88 MG/DL (ref 70–99)
HCT VFR BLD AUTO: 32.3 % (ref 35–47)
HGB BLD-MCNC: 10.7 G/DL (ref 11.7–15.7)
MCH RBC QN AUTO: 27.2 PG (ref 26.5–33)
MCHC RBC AUTO-ENTMCNC: 33.1 G/DL (ref 31.5–36.5)
MCV RBC AUTO: 82 FL (ref 78–100)
PLATELET # BLD AUTO: 195 10E9/L (ref 150–450)
POTASSIUM SERPL-SCNC: 3.9 MMOL/L (ref 3.4–5.3)
PROT SERPL-MCNC: 6.9 G/DL (ref 6.8–8.8)
PROT UR-MCNC: <0.05 G/L
PROT/CREAT 24H UR: NORMAL G/G CR (ref 0–0.2)
RBC # BLD AUTO: 3.93 10E12/L (ref 3.8–5.2)
SODIUM SERPL-SCNC: 140 MMOL/L (ref 133–144)
WBC # BLD AUTO: 10 10E9/L (ref 4–11)

## 2019-07-06 PROCEDURE — 59025 FETAL NON-STRESS TEST: CPT | Mod: 26 | Performed by: ADVANCED PRACTICE MIDWIFE

## 2019-07-06 PROCEDURE — G0463 HOSPITAL OUTPT CLINIC VISIT: HCPCS | Mod: 25

## 2019-07-06 PROCEDURE — 84156 ASSAY OF PROTEIN URINE: CPT | Performed by: ADVANCED PRACTICE MIDWIFE

## 2019-07-06 PROCEDURE — 80053 COMPREHEN METABOLIC PANEL: CPT | Performed by: ADVANCED PRACTICE MIDWIFE

## 2019-07-06 PROCEDURE — 59025 FETAL NON-STRESS TEST: CPT

## 2019-07-06 PROCEDURE — 85027 COMPLETE CBC AUTOMATED: CPT | Performed by: ADVANCED PRACTICE MIDWIFE

## 2019-07-06 PROCEDURE — 36415 COLL VENOUS BLD VENIPUNCTURE: CPT | Performed by: ADVANCED PRACTICE MIDWIFE

## 2019-07-06 RX ORDER — ACETAMINOPHEN 325 MG/1
650 TABLET ORAL
Status: DISCONTINUED | OUTPATIENT
Start: 2019-07-06 | End: 2019-07-06 | Stop reason: HOSPADM

## 2019-07-06 RX ORDER — ONDANSETRON 2 MG/ML
4 INJECTION INTRAMUSCULAR; INTRAVENOUS EVERY 6 HOURS PRN
Status: DISCONTINUED | OUTPATIENT
Start: 2019-07-06 | End: 2019-07-06 | Stop reason: HOSPADM

## 2019-07-06 ASSESSMENT — ACTIVITIES OF DAILY LIVING (ADL)
TRANSFERRING: 0-->INDEPENDENT
SWALLOWING: 0-->SWALLOWS FOODS/LIQUIDS WITHOUT DIFFICULTY
COGNITION: 0 - NO COGNITION ISSUES REPORTED
NUMBER_OF_TIMES_PATIENT_HAS_FALLEN_WITHIN_LAST_SIX_MONTHS: 1
FALL_HISTORY_WITHIN_LAST_SIX_MONTHS: YES
AMBULATION: 0-->INDEPENDENT
DRESS: 0-->INDEPENDENT
TOILETING: 0-->INDEPENDENT
BATHING: 0-->INDEPENDENT
RETIRED_EATING: 0-->INDEPENDENT
RETIRED_COMMUNICATION: 0-->UNDERSTANDS/COMMUNICATES WITHOUT DIFFICULTY

## 2019-07-06 NOTE — TELEPHONE ENCOUNTER
Pt reports a new onset of intermittent vertigo spells, BP is elevated to 135/90 (pt reports baseline is 110/60).  She believes she is staying hydrated, drinking 100 oz of fluids per day.  She denies contractions, bleeding.      Disposition:  Page CNM on call  7:02PM:  Smart Web used to page on-call CNM Hanna Butts to call pt directly at [phone].  Advised pt to call FNA back if no response from the provider within 20 minutes.  She verbalized understanding and had no further questions.     Maira Murrieta, RN/MARSHA    Reason for Disposition    Patient sounds very sick or weak to the triager    Additional Information    Negative: [1] Weakness (i.e., paralysis, loss of muscle strength) of the face, arm or leg on one side of the body AND [2] sudden onset AND [3] present now    Negative: [1] Numbness (i.e., loss of sensation) of the face, arm or leg on one side of the body AND [2] sudden onset AND [3] present now    Negative: [1] Loss of speech or garbled speech AND [2] sudden onset AND [3] present now    Negative: Difficult to awaken or acting confused (e.g., disoriented, slurred speech)    Negative: Sounds like a life-threatening emergency to the triager    Negative: Followed a head injury    Negative: Followed an ear injury    Negative: Localized weakness or numbness is main symptom    Negative: Dizziness relates to riding in a car, going to an amusement park, etc.    Negative: [1] Dizziness is main symptom AND [2] NO spinning sensation (i.e., vertigo)    Negative: SEVERE dizziness (vertigo) (e.g., unable to walk without assistance)    Negative: [1] Dizziness (vertigo) present now AND [2] one or more stroke risk factors (i.e., hypertension, diabetes, prior stroke/TIA/heart attack)  (Exception: prior physician evaluation for this AND no different/worse than usual)    Negative: [1] Dizziness (vertigo) present now AND [2] age > 60  (Exception: prior physician evaluation for this AND no different/worse than usual)     Negative: Severe headache (e.g., excruciating)  (Exception: similar to previous migraines)    Protocols used: DIZZINESS - VERTIGO-A-AH

## 2019-07-07 NOTE — TELEPHONE ENCOUNTER
"Call back to Melina after page by FNA. Concerned over having dizzy spells all day. Happens randomly for 5-10 seconds at a time. Does not correlate any activity with when dizziness occurs. Was at her baby shower today and went for a walk, like usual. \"Thought at first it was a fluke.\" Reports drinking adequate amounts of fluid all day, 100 oz.     Also states taking blood pressure with \"a blood pressure cuff I have never used before.\" First BP ~1hour ago was 130/90, second BP 45 minutes later was \"still the same.\"  Was told by her OB from her previous practice getting a blood pressure cuff was an option.     Has had a \"head cold,\" for a day, but is now on the \"up swing.\" Has a runny nose, and a headache all day. Reports blurry vision with dizzy spells and \"light in the corner.\" Has not taken any medications for the head cold or headache.     Advised not to use BP cuff at home, unsure if calibrated and accurate. Discussed evaluation in the MAC or trying Tylenol first. Melina would like to be seen in the MAC for evaluation.     MAC called, report given. Orders given for pre-e labs and NST. Will plan to discharge home if labs are normal, follow up in clinic next week.    aHnna CONNOLLY CNM    "

## 2019-07-07 NOTE — DISCHARGE INSTRUCTIONS
Discharge Instruction for Undelivered Patients      You were seen for: Pre-eclampsia evaluation  We Consulted: Hanna Butts CNM  You had (Test or Medicine):Fetal and uterine monitoring, labwork     Diet:   Drink 8 to 12 glasses of liquids (milk, juice, water) every day.  You may eat meals and snacks.     Activity:  Call your doctor or nurse midwife if your baby is moving less than usual.     Call your provider if you notice:  Swelling in your face or increased swelling in your hands or legs.  Headaches that are not relieved by Tylenol (acetaminophen).  Changes in your vision (blurring: seeing spots or stars.)  Nausea (sick to your stomach) and vomiting (throwing up).   Weight gain of 5 pounds or more per week.  Heartburn that doesn't go away.  Signs of bladder infection: pain when you urinate (use the toilet), need to go more often and more urgently.  The bag of diaz (rupture of membranes) breaks, or you notice leaking in your underwear.  Bright red blood in your underwear.  Abdominal (lower belly) or stomach pain.  For first baby: Contractions (tightening) less than 5 minutes apart for one hour or more.  Increase or change in vaginal discharge (note the color and amount)      Follow-up:  As scheduled in the clinic on Monday.

## 2019-07-07 NOTE — PLAN OF CARE
Melina is a 24yo  37w3d presents to MAC with generalized HA today (none now), distance vision is blurry for the past week. Denies epigastric pain, cramping, LOF, bleeding. +FM. States she drank 70oz of water today and 10 oz of gatorade. Orders obtained from Hanna JAIME RN. Pt declines tylenol. Reflexes 1+,  No clonus.  -Hanna Butts CNM updated. Discharge to home. Follow up in clinic as scheduled.  -Discharge instructions reviewed with pt. Pt states understanding.

## 2019-07-08 ENCOUNTER — PRENATAL OFFICE VISIT (OUTPATIENT)
Dept: MIDWIFE SERVICES | Facility: CLINIC | Age: 24
End: 2019-07-08
Payer: COMMERCIAL

## 2019-07-08 VITALS — DIASTOLIC BLOOD PRESSURE: 60 MMHG | BODY MASS INDEX: 23.26 KG/M2 | WEIGHT: 153 LBS | SYSTOLIC BLOOD PRESSURE: 102 MMHG

## 2019-07-08 DIAGNOSIS — O09.93 SUPERVISION OF HIGH RISK PREGNANCY IN THIRD TRIMESTER: Primary | ICD-10-CM

## 2019-07-08 DIAGNOSIS — O99.340 ANXIETY DISORDER AFFECTING PREGNANCY, ANTEPARTUM: ICD-10-CM

## 2019-07-08 DIAGNOSIS — Z3A.37 37 WEEKS GESTATION OF PREGNANCY: ICD-10-CM

## 2019-07-08 DIAGNOSIS — F41.9 ANXIETY DISORDER AFFECTING PREGNANCY, ANTEPARTUM: ICD-10-CM

## 2019-07-08 PROBLEM — I10 HYPERTENSION: Status: RESOLVED | Noted: 2019-07-06 | Resolved: 2019-07-08

## 2019-07-08 PROCEDURE — 99207 ZZC PRENATAL VISIT: CPT | Performed by: ADVANCED PRACTICE MIDWIFE

## 2019-07-08 NOTE — PROGRESS NOTES
Feels okay, no further s/sx and symptoms of preeclmapsia, now feels it was related to her bad cold she is having. Symptoms are staying steady, hoping it resolves before she goes into labor. Also reports some nipple discoloration and dry skin.  Recommend symptom management, rest, and hydration. Encouraged moisturizing nipple area to prevent dryness and reviewed normal pigmentation changes in pregnancy. Discussed post dates management, SVE around 40 weeks with option for membrane sweep and induction of labor between 41-42 if no labor  Fetal movement: positive  Denies vaginal bleeding/lof, no contractions, said nurses reported she had some at the hospital Saturday but soo was unaware  Warning signs reviewed   Labor signs and symptoms discussed, aware of numbers to call  Denies s/sx of pre-eclampsia and aware of what to report  Return to clinic 1 week    MOHSEN Boo, CNM

## 2019-07-10 NOTE — PROGRESS NOTES
Feels well, cold has resolved.   Fetal movement: positive  Denies vaginal bleeding/lof, some contractions, lots of trina proctor   Warning signs reviewed   SVE 3/70/-1 soft/posterior  Labor signs and symptoms discussed; reviewed 5:1:1 rule   Denies s/sx of preeclampsia and aware of what to report  Return to clinic 1 week    MOHSEN Boo, CNM

## 2019-07-16 ENCOUNTER — PRENATAL OFFICE VISIT (OUTPATIENT)
Dept: MIDWIFE SERVICES | Facility: CLINIC | Age: 24
End: 2019-07-16
Payer: COMMERCIAL

## 2019-07-16 VITALS — BODY MASS INDEX: 23.26 KG/M2 | DIASTOLIC BLOOD PRESSURE: 66 MMHG | WEIGHT: 153 LBS | SYSTOLIC BLOOD PRESSURE: 104 MMHG

## 2019-07-16 DIAGNOSIS — Z3A.38 38 WEEKS GESTATION OF PREGNANCY: ICD-10-CM

## 2019-07-16 DIAGNOSIS — F41.9 ANXIETY DISORDER AFFECTING PREGNANCY, ANTEPARTUM: ICD-10-CM

## 2019-07-16 DIAGNOSIS — O99.340 ANXIETY DISORDER AFFECTING PREGNANCY, ANTEPARTUM: ICD-10-CM

## 2019-07-16 DIAGNOSIS — O09.93 SUPERVISION OF HIGH RISK PREGNANCY IN THIRD TRIMESTER: Primary | ICD-10-CM

## 2019-07-16 PROCEDURE — 99207 ZZC PRENATAL VISIT: CPT | Performed by: ADVANCED PRACTICE MIDWIFE

## 2019-07-17 ENCOUNTER — TELEPHONE (OUTPATIENT)
Dept: OBGYN | Facility: CLINIC | Age: 24
End: 2019-07-17

## 2019-07-17 NOTE — TELEPHONE ENCOUNTER
39 weeks. Had SVE yesterday. Has had some vaginal spotting. Mix of brown and some red blood. Informed that this is normal. Pt states has occ cramping, no LOF. FM present. Informed to monitor for now. Call back if has bright red bleeding like first day of a cycle.

## 2019-07-20 NOTE — PROGRESS NOTES
Feels well, has been walking a lot. Hoping baby comes on his own because she would like a waterbirth. Wondering why she hasn't gone into labor since she is 4 cm. Would like membrane sweep today. Had spotting for about 24 hours after last check  Fetal movement: positive  Denies vaginal bleeding/lof, some contractions  Warning signs reviewed   SVE 4/70/-1, anterior, medium, sutures palpated, membranes swept today   Labor signs and symptoms discussed  Denies s/sx of pre-eclampsia and aware of what to report  Discussed post dates management, order for BPP at 41 weeks  Will schedule BPP in 1 week and induction of labor next week and plan for induction of labor after that  Return to clinic 1 week    MOHSEN Boo, CNM

## 2019-07-22 ENCOUNTER — TELEPHONE (OUTPATIENT)
Dept: OBGYN | Facility: CLINIC | Age: 24
End: 2019-07-22

## 2019-07-22 NOTE — TELEPHONE ENCOUNTER
Pt thought she had SROM this morning with small spot of fluid on underwear. Laid down for 30 mins and stood with no further leaking. Later this afternoon had another spot in underwear. Smells musty. Size of golf ball. Pt denies vag bleeding. Having irregular contractions. FM present. Pt advised to try and lay down again and then stand after 30 mins to see if further leaking. Pt also offered to go to MAC at [facility] to check it out. Pt would like to trying laying down again. Will call if has further leaking.

## 2019-07-23 ENCOUNTER — PRENATAL OFFICE VISIT (OUTPATIENT)
Dept: MIDWIFE SERVICES | Facility: CLINIC | Age: 24
End: 2019-07-23
Payer: COMMERCIAL

## 2019-07-23 VITALS — BODY MASS INDEX: 23.63 KG/M2 | SYSTOLIC BLOOD PRESSURE: 120 MMHG | WEIGHT: 155.4 LBS | DIASTOLIC BLOOD PRESSURE: 78 MMHG

## 2019-07-23 DIAGNOSIS — O09.93 SUPERVISION OF HIGH RISK PREGNANCY IN THIRD TRIMESTER: Primary | ICD-10-CM

## 2019-07-23 DIAGNOSIS — Z3A.39 39 WEEKS GESTATION OF PREGNANCY: ICD-10-CM

## 2019-07-23 DIAGNOSIS — F41.9 ANXIETY DISORDER AFFECTING PREGNANCY, ANTEPARTUM: ICD-10-CM

## 2019-07-23 DIAGNOSIS — O99.340 ANXIETY DISORDER AFFECTING PREGNANCY, ANTEPARTUM: ICD-10-CM

## 2019-07-23 PROCEDURE — 59426 ANTEPARTUM CARE ONLY: CPT | Performed by: ADVANCED PRACTICE MIDWIFE

## 2019-07-23 PROCEDURE — 99207 ZZC PRENATAL VISIT: CPT | Performed by: ADVANCED PRACTICE MIDWIFE

## 2019-07-25 ENCOUNTER — TELEPHONE (OUTPATIENT)
Dept: OBGYN | Facility: CLINIC | Age: 24
End: 2019-07-25

## 2019-07-25 NOTE — TELEPHONE ENCOUNTER
Next 5 appointments (look out 90 days)    Jul 30, 2019  8:50 AM CDT  ESTABLISHED PRENATAL with MOHSEN Ortega CNM  [facility] [place] ([facility]) [address] [place] [address] [id number] [phone]        Feeling nauseated no emisis  Watery diarrhea since Tuesday night  Feels tired  Afebrile  Pushing fluids/gatorade  Still eating eat breakfast freq snacks throughout the day   Not really interested in dinner    Advised continue to stay well hydrated, can try imodium  Consulted with Madeline Sandra CNM-agrees with advise given  Pt to call back if diarrhea does not improve or worsens    Baby active   No bleeding leaking of fluid  occ contractions  Carmen Edward RN on 7/25/2019 at 1:10 PM

## 2019-07-26 ENCOUNTER — HOSPITAL ENCOUNTER (INPATIENT)
Facility: CLINIC | Age: 24
LOS: 2 days | Discharge: HOME | End: 2019-07-28
Attending: ADVANCED PRACTICE MIDWIFE | Admitting: ADVANCED PRACTICE MIDWIFE
Payer: COMMERCIAL

## 2019-07-26 ENCOUNTER — ANESTHESIA (OUTPATIENT)
Dept: OBGYN | Facility: CLINIC | Age: 24
End: 2019-07-26
Payer: COMMERCIAL

## 2019-07-26 ENCOUNTER — ANESTHESIA EVENT (OUTPATIENT)
Dept: OBGYN | Facility: CLINIC | Age: 24
End: 2019-07-26
Payer: COMMERCIAL

## 2019-07-26 ENCOUNTER — NURSE TRIAGE (OUTPATIENT)
Dept: NURSING | Facility: CLINIC | Age: 24
End: 2019-07-26

## 2019-07-26 DIAGNOSIS — D62 ANEMIA DUE TO BLOOD LOSS, ACUTE: Primary | ICD-10-CM

## 2019-07-26 PROBLEM — F41.1 GAD (GENERALIZED ANXIETY DISORDER): Status: ACTIVE | Noted: 2018-08-02

## 2019-07-26 PROBLEM — K58.0 IRRITABLE BOWEL SYNDROME WITH DIARRHEA: Status: ACTIVE | Noted: 2018-08-02

## 2019-07-26 PROBLEM — O26.90 PREGNANCY RELATED CONDITION: Status: ACTIVE | Noted: 2019-07-26

## 2019-07-26 LAB
ABO + RH BLD: NORMAL
ABO + RH BLD: NORMAL
BASOPHILS # BLD AUTO: 0 10E9/L (ref 0–0.2)
BASOPHILS NFR BLD AUTO: 0.2 %
BLD GP AB SCN SERPL QL: NORMAL
BLOOD BANK CMNT PATIENT-IMP: NORMAL
DIFFERENTIAL METHOD BLD: ABNORMAL
EOSINOPHIL # BLD AUTO: 0 10E9/L (ref 0–0.7)
EOSINOPHIL NFR BLD AUTO: 0.3 %
ERYTHROCYTE [DISTWIDTH] IN BLOOD BY AUTOMATED COUNT: 14.2 % (ref 10–15)
HCT VFR BLD AUTO: 34.1 % (ref 35–47)
HGB BLD-MCNC: 10.8 G/DL (ref 11.7–15.7)
IMM GRANULOCYTES # BLD: 0.1 10E9/L (ref 0–0.4)
IMM GRANULOCYTES NFR BLD: 0.7 %
LYMPHOCYTES # BLD AUTO: 2.2 10E9/L (ref 0.8–5.3)
LYMPHOCYTES NFR BLD AUTO: 24.2 %
MCH RBC QN AUTO: 25.7 PG (ref 26.5–33)
MCHC RBC AUTO-ENTMCNC: 31.7 G/DL (ref 31.5–36.5)
MCV RBC AUTO: 81 FL (ref 78–100)
MONOCYTES # BLD AUTO: 0.7 10E9/L (ref 0–1.3)
MONOCYTES NFR BLD AUTO: 7.5 %
NEUTROPHILS # BLD AUTO: 6.1 10E9/L (ref 1.6–8.3)
NEUTROPHILS NFR BLD AUTO: 67.1 %
NRBC # BLD AUTO: 0 10*3/UL
NRBC BLD AUTO-RTO: 0 /100
PLATELET # BLD AUTO: 224 10E9/L (ref 150–450)
RBC # BLD AUTO: 4.21 10E12/L (ref 3.8–5.2)
RUPTURE OF FETAL MEMBRANES BY ROM PLUS: POSITIVE
SPECIMEN EXP DATE BLD: NORMAL
T PALLIDUM AB SER QL: NONREACTIVE
WBC # BLD AUTO: 9 10E9/L (ref 4–11)

## 2019-07-26 PROCEDURE — 25000128 H RX IP 250 OP 636: Performed by: ADVANCED PRACTICE MIDWIFE

## 2019-07-26 PROCEDURE — 25000128 H RX IP 250 OP 636: Performed by: NURSE PRACTITIONER

## 2019-07-26 PROCEDURE — 0DQR0ZZ REPAIR ANAL SPHINCTER, OPEN APPROACH: ICD-10-PCS | Performed by: OBSTETRICS & GYNECOLOGY

## 2019-07-26 PROCEDURE — 85025 COMPLETE CBC W/AUTO DIFF WBC: CPT | Performed by: ADVANCED PRACTICE MIDWIFE

## 2019-07-26 PROCEDURE — G0463 HOSPITAL OUTPT CLINIC VISIT: HCPCS | Mod: 25

## 2019-07-26 PROCEDURE — 86901 BLOOD TYPING SEROLOGIC RH(D): CPT | Performed by: ADVANCED PRACTICE MIDWIFE

## 2019-07-26 PROCEDURE — 84112 EVAL AMNIOTIC FLUID PROTEIN: CPT | Performed by: ADVANCED PRACTICE MIDWIFE

## 2019-07-26 PROCEDURE — 59410 OBSTETRICAL CARE: CPT | Mod: 22 | Performed by: OBSTETRICS & GYNECOLOGY

## 2019-07-26 PROCEDURE — 0UQGXZZ REPAIR VAGINA, EXTERNAL APPROACH: ICD-10-PCS | Performed by: OBSTETRICS & GYNECOLOGY

## 2019-07-26 PROCEDURE — 3E0R3BZ INTRODUCTION OF ANESTHETIC AGENT INTO SPINAL CANAL, PERCUTANEOUS APPROACH: ICD-10-PCS | Performed by: ANESTHESIOLOGY

## 2019-07-26 PROCEDURE — 59025 FETAL NON-STRESS TEST: CPT

## 2019-07-26 PROCEDURE — 59025 FETAL NON-STRESS TEST: CPT | Mod: 26 | Performed by: ADVANCED PRACTICE MIDWIFE

## 2019-07-26 PROCEDURE — 10907ZC DRAINAGE OF AMNIOTIC FLUID, THERAPEUTIC FROM PRODUCTS OF CONCEPTION, VIA NATURAL OR ARTIFICIAL OPENING: ICD-10-PCS | Performed by: OBSTETRICS & GYNECOLOGY

## 2019-07-26 PROCEDURE — 12000000 ZZH R&B MED SURG/OB

## 2019-07-26 PROCEDURE — 25800030 ZZH RX IP 258 OP 636: Performed by: ADVANCED PRACTICE MIDWIFE

## 2019-07-26 PROCEDURE — 00HU33Z INSERTION OF INFUSION DEVICE INTO SPINAL CANAL, PERCUTANEOUS APPROACH: ICD-10-PCS | Performed by: ANESTHESIOLOGY

## 2019-07-26 PROCEDURE — 86850 RBC ANTIBODY SCREEN: CPT | Performed by: ADVANCED PRACTICE MIDWIFE

## 2019-07-26 PROCEDURE — 36415 COLL VENOUS BLD VENIPUNCTURE: CPT | Performed by: ADVANCED PRACTICE MIDWIFE

## 2019-07-26 PROCEDURE — 86780 TREPONEMA PALLIDUM: CPT | Performed by: ADVANCED PRACTICE MIDWIFE

## 2019-07-26 PROCEDURE — 25000128 H RX IP 250 OP 636: Performed by: ANESTHESIOLOGY

## 2019-07-26 PROCEDURE — 37000011 ZZH ANESTHESIA WARD SERVICE

## 2019-07-26 PROCEDURE — 27110038 ZZH RX 271: Performed by: ANESTHESIOLOGY

## 2019-07-26 PROCEDURE — 0UC97ZZ EXTIRPATION OF MATTER FROM UTERUS, VIA NATURAL OR ARTIFICIAL OPENING: ICD-10-PCS | Performed by: OBSTETRICS & GYNECOLOGY

## 2019-07-26 PROCEDURE — 25000132 ZZH RX MED GY IP 250 OP 250 PS 637: Performed by: NURSE PRACTITIONER

## 2019-07-26 PROCEDURE — 86900 BLOOD TYPING SEROLOGIC ABO: CPT | Performed by: ADVANCED PRACTICE MIDWIFE

## 2019-07-26 PROCEDURE — 25800030 ZZH RX IP 258 OP 636: Performed by: NURSE PRACTITIONER

## 2019-07-26 PROCEDURE — 25000125 ZZHC RX 250: Performed by: ANESTHESIOLOGY

## 2019-07-26 PROCEDURE — 72200001 ZZH LABOR CARE VAGINAL DELIVERY SINGLE

## 2019-07-26 RX ORDER — ROPIVACAINE HYDROCHLORIDE 2 MG/ML
10 INJECTION, SOLUTION EPIDURAL; INFILTRATION; PERINEURAL ONCE
Status: DISCONTINUED | OUTPATIENT
Start: 2019-07-26 | End: 2019-07-26

## 2019-07-26 RX ORDER — FENTANYL CITRATE 50 UG/ML
100 INJECTION, SOLUTION INTRAMUSCULAR; INTRAVENOUS
Status: DISCONTINUED | OUTPATIENT
Start: 2019-07-26 | End: 2019-07-26

## 2019-07-26 RX ORDER — ROPIVACAINE HYDROCHLORIDE 2 MG/ML
INJECTION, SOLUTION EPIDURAL; INFILTRATION; PERINEURAL
Status: COMPLETED
Start: 2019-07-26 | End: 2019-07-26

## 2019-07-26 RX ORDER — LANOLIN 100 %
OINTMENT (GRAM) TOPICAL
Status: DISCONTINUED | OUTPATIENT
Start: 2019-07-26 | End: 2019-07-28 | Stop reason: HOSPADM

## 2019-07-26 RX ORDER — LIDOCAINE HYDROCHLORIDE AND EPINEPHRINE 15; 5 MG/ML; UG/ML
INJECTION, SOLUTION EPIDURAL PRN
Status: DISCONTINUED | OUTPATIENT
Start: 2019-07-26 | End: 2019-07-27 | Stop reason: HOSPADM

## 2019-07-26 RX ORDER — AMOXICILLIN 250 MG
2 CAPSULE ORAL 2 TIMES DAILY
Status: DISCONTINUED | OUTPATIENT
Start: 2019-07-26 | End: 2019-07-28 | Stop reason: HOSPADM

## 2019-07-26 RX ORDER — EPHEDRINE SULFATE 50 MG/ML
5 INJECTION, SOLUTION INTRAMUSCULAR; INTRAVENOUS; SUBCUTANEOUS
Status: DISCONTINUED | OUTPATIENT
Start: 2019-07-26 | End: 2019-07-26

## 2019-07-26 RX ORDER — NALOXONE HYDROCHLORIDE 0.4 MG/ML
.1-.4 INJECTION, SOLUTION INTRAMUSCULAR; INTRAVENOUS; SUBCUTANEOUS
Status: DISCONTINUED | OUTPATIENT
Start: 2019-07-26 | End: 2019-07-26

## 2019-07-26 RX ORDER — ACETAMINOPHEN 325 MG/1
650 TABLET ORAL EVERY 4 HOURS PRN
Status: DISCONTINUED | OUTPATIENT
Start: 2019-07-26 | End: 2019-07-28 | Stop reason: HOSPADM

## 2019-07-26 RX ORDER — BISACODYL 10 MG
10 SUPPOSITORY, RECTAL RECTAL DAILY PRN
Status: DISCONTINUED | OUTPATIENT
Start: 2019-07-28 | End: 2019-07-28 | Stop reason: HOSPADM

## 2019-07-26 RX ORDER — CARBOPROST TROMETHAMINE 250 UG/ML
250 INJECTION, SOLUTION INTRAMUSCULAR
Status: DISCONTINUED | OUTPATIENT
Start: 2019-07-26 | End: 2019-07-26

## 2019-07-26 RX ORDER — ROPIVACAINE HYDROCHLORIDE 2 MG/ML
10 INJECTION, SOLUTION EPIDURAL; INFILTRATION; PERINEURAL ONCE
Status: COMPLETED | OUTPATIENT
Start: 2019-07-26 | End: 2019-07-26

## 2019-07-26 RX ORDER — ACETAMINOPHEN 325 MG/1
650 TABLET ORAL EVERY 4 HOURS PRN
Status: DISCONTINUED | OUTPATIENT
Start: 2019-07-26 | End: 2019-07-26

## 2019-07-26 RX ORDER — NALBUPHINE HYDROCHLORIDE 10 MG/ML
2.5-5 INJECTION, SOLUTION INTRAMUSCULAR; INTRAVENOUS; SUBCUTANEOUS EVERY 6 HOURS PRN
Status: DISCONTINUED | OUTPATIENT
Start: 2019-07-26 | End: 2019-07-26

## 2019-07-26 RX ORDER — OXYCODONE AND ACETAMINOPHEN 5; 325 MG/1; MG/1
1 TABLET ORAL
Status: DISCONTINUED | OUTPATIENT
Start: 2019-07-26 | End: 2019-07-26

## 2019-07-26 RX ORDER — OXYTOCIN/0.9 % SODIUM CHLORIDE 30/500 ML
100 PLASTIC BAG, INJECTION (ML) INTRAVENOUS CONTINUOUS
Status: DISCONTINUED | OUTPATIENT
Start: 2019-07-26 | End: 2019-07-28 | Stop reason: HOSPADM

## 2019-07-26 RX ORDER — AMOXICILLIN 250 MG
1 CAPSULE ORAL 2 TIMES DAILY
Status: DISCONTINUED | OUTPATIENT
Start: 2019-07-26 | End: 2019-07-28 | Stop reason: HOSPADM

## 2019-07-26 RX ORDER — METHYLERGONOVINE MALEATE 0.2 MG/ML
200 INJECTION INTRAVENOUS
Status: DISCONTINUED | OUTPATIENT
Start: 2019-07-26 | End: 2019-07-26

## 2019-07-26 RX ORDER — OXYTOCIN/0.9 % SODIUM CHLORIDE 30/500 ML
100-340 PLASTIC BAG, INJECTION (ML) INTRAVENOUS CONTINUOUS PRN
Status: COMPLETED | OUTPATIENT
Start: 2019-07-26 | End: 2019-07-26

## 2019-07-26 RX ORDER — FENTANYL CITRATE 50 UG/ML
50-100 INJECTION, SOLUTION INTRAMUSCULAR; INTRAVENOUS
Status: DISCONTINUED | OUTPATIENT
Start: 2019-07-26 | End: 2019-07-26 | Stop reason: DRUGHIGH

## 2019-07-26 RX ORDER — MISOPROSTOL 200 UG/1
400 TABLET ORAL
Status: DISCONTINUED | OUTPATIENT
Start: 2019-07-26 | End: 2019-07-28 | Stop reason: HOSPADM

## 2019-07-26 RX ORDER — OXYTOCIN 10 [USP'U]/ML
10 INJECTION, SOLUTION INTRAMUSCULAR; INTRAVENOUS
Status: DISCONTINUED | OUTPATIENT
Start: 2019-07-26 | End: 2019-07-26

## 2019-07-26 RX ORDER — IBUPROFEN 400 MG/1
800 TABLET, FILM COATED ORAL EVERY 6 HOURS PRN
Status: DISCONTINUED | OUTPATIENT
Start: 2019-07-26 | End: 2019-07-28 | Stop reason: HOSPADM

## 2019-07-26 RX ORDER — OXYTOCIN 10 [USP'U]/ML
10 INJECTION, SOLUTION INTRAMUSCULAR; INTRAVENOUS
Status: DISCONTINUED | OUTPATIENT
Start: 2019-07-26 | End: 2019-07-28 | Stop reason: HOSPADM

## 2019-07-26 RX ORDER — NALOXONE HYDROCHLORIDE 0.4 MG/ML
.1-.4 INJECTION, SOLUTION INTRAMUSCULAR; INTRAVENOUS; SUBCUTANEOUS
Status: DISCONTINUED | OUTPATIENT
Start: 2019-07-26 | End: 2019-07-28 | Stop reason: HOSPADM

## 2019-07-26 RX ORDER — IBUPROFEN 400 MG/1
800 TABLET, FILM COATED ORAL
Status: DISCONTINUED | OUTPATIENT
Start: 2019-07-26 | End: 2019-07-26

## 2019-07-26 RX ORDER — OXYTOCIN/0.9 % SODIUM CHLORIDE 30/500 ML
1-24 PLASTIC BAG, INJECTION (ML) INTRAVENOUS CONTINUOUS
Status: DISCONTINUED | OUTPATIENT
Start: 2019-07-26 | End: 2019-07-26

## 2019-07-26 RX ORDER — PRENATAL VIT/IRON FUM/FOLIC AC 27MG-0.8MG
1 TABLET ORAL DAILY
Status: DISCONTINUED | OUTPATIENT
Start: 2019-07-27 | End: 2019-07-28 | Stop reason: HOSPADM

## 2019-07-26 RX ORDER — FENTANYL CITRATE 50 UG/ML
100 INJECTION, SOLUTION INTRAMUSCULAR; INTRAVENOUS ONCE
Status: COMPLETED | OUTPATIENT
Start: 2019-07-26 | End: 2019-07-26

## 2019-07-26 RX ORDER — HYDROCORTISONE 2.5 %
CREAM (GRAM) TOPICAL 3 TIMES DAILY PRN
Status: DISCONTINUED | OUTPATIENT
Start: 2019-07-26 | End: 2019-07-28 | Stop reason: HOSPADM

## 2019-07-26 RX ORDER — ONDANSETRON 2 MG/ML
4 INJECTION INTRAMUSCULAR; INTRAVENOUS EVERY 6 HOURS PRN
Status: DISCONTINUED | OUTPATIENT
Start: 2019-07-26 | End: 2019-07-26

## 2019-07-26 RX ORDER — SODIUM CHLORIDE, SODIUM LACTATE, POTASSIUM CHLORIDE, CALCIUM CHLORIDE 600; 310; 30; 20 MG/100ML; MG/100ML; MG/100ML; MG/100ML
INJECTION, SOLUTION INTRAVENOUS CONTINUOUS
Status: DISCONTINUED | OUTPATIENT
Start: 2019-07-26 | End: 2019-07-26

## 2019-07-26 RX ORDER — OXYTOCIN/0.9 % SODIUM CHLORIDE 30/500 ML
340 PLASTIC BAG, INJECTION (ML) INTRAVENOUS CONTINUOUS PRN
Status: DISCONTINUED | OUTPATIENT
Start: 2019-07-26 | End: 2019-07-28 | Stop reason: HOSPADM

## 2019-07-26 RX ADMIN — ACETAMINOPHEN 650 MG: 325 TABLET, FILM COATED ORAL at 23:28

## 2019-07-26 RX ADMIN — SODIUM CHLORIDE, POTASSIUM CHLORIDE, SODIUM LACTATE AND CALCIUM CHLORIDE: 600; 310; 30; 20 INJECTION, SOLUTION INTRAVENOUS at 17:00

## 2019-07-26 RX ADMIN — ROPIVACAINE HYDROCHLORIDE 10 ML: 2 INJECTION, SOLUTION EPIDURAL; INFILTRATION at 13:42

## 2019-07-26 RX ADMIN — SODIUM CHLORIDE, POTASSIUM CHLORIDE, SODIUM LACTATE AND CALCIUM CHLORIDE: 600; 310; 30; 20 INJECTION, SOLUTION INTRAVENOUS at 13:51

## 2019-07-26 RX ADMIN — OXYTOCIN 340 ML/HR: 10 INJECTION INTRAVENOUS at 21:13

## 2019-07-26 RX ADMIN — SODIUM CHLORIDE, POTASSIUM CHLORIDE, SODIUM LACTATE AND CALCIUM CHLORIDE 1000 ML: 600; 310; 30; 20 INJECTION, SOLUTION INTRAVENOUS at 12:50

## 2019-07-26 RX ADMIN — SODIUM CHLORIDE, POTASSIUM CHLORIDE, SODIUM LACTATE AND CALCIUM CHLORIDE: 600; 310; 30; 20 INJECTION, SOLUTION INTRAVENOUS at 14:30

## 2019-07-26 RX ADMIN — OXYTOCIN 2 MILLI-UNITS/MIN: 10 INJECTION INTRAVENOUS at 14:11

## 2019-07-26 RX ADMIN — IBUPROFEN 800 MG: 400 TABLET ORAL at 23:28

## 2019-07-26 RX ADMIN — LIDOCAINE HYDROCHLORIDE AND EPINEPHRINE 3 ML: 15; 5 INJECTION, SOLUTION EPIDURAL at 13:42

## 2019-07-26 RX ADMIN — FENTANYL CITRATE 100 MCG: 50 INJECTION, SOLUTION INTRAMUSCULAR; INTRAVENOUS at 14:50

## 2019-07-26 RX ADMIN — ROPIVACAINE HYDROCHLORIDE 8 ML: 2 INJECTION, SOLUTION EPIDURAL; INFILTRATION at 14:50

## 2019-07-26 RX ADMIN — FENTANYL CITRATE 100 MCG: 50 INJECTION, SOLUTION INTRAMUSCULAR; INTRAVENOUS at 13:42

## 2019-07-26 RX ADMIN — Medication 12 ML/HR: at 13:51

## 2019-07-26 NOTE — ANESTHESIA PROCEDURE NOTES
Peripheral nerve/Neuraxial procedure note : epidural catheter  Pre-Procedure  Performed by Oscar Pisano DO  Location: OB      Pre-Anesthestic Checklist: patient identified, IV checked, site marked, risks and benefits discussed, informed consent, monitors and equipment checked, pre-op evaluation and at physician/surgeon's request    Timeout  Correct Patient: Yes   Correct Procedure: Yes   Correct Site: Yes   Correct Laterality: Yes   Correct Position: Yes   Site Marked: Yes   .   Procedure Documentation    .    Procedure:    Epidural catheter.  Insertion Site:L2-3  (midline approach) Injection technique: LORT saline and LORT air   Local skin infiltrated with 1 mL of 1% lidocaine.       Patient Prep;povidone-iodine 7.5% surgical scrub.  .  Needle: Touhy needle Needle Gauge: 17.    Needle Length (Inches) 3.5  # of attempts: 1 and # of redirects:  .   Catheter: 19 G . .  Catheter threaded easily  .  .   .    Assessment/Narrative  Paresthesias: No.  .  .  Aspiration negative for heme or CSF  . Test dose of 3 mL lidocaine 1.5% w/ 1:200,000 epinephrine at. Test dose negative for signs of intravascular, subdural or intrathecal injection. Comments:  Pre-procedure time out completed. Patient in sitting position, the lumbar spine was prepped and draped in sterile fashion. The L2/L3 interspace was identified and local anesthetic was injected for local skin infiltration. A 17 G touhy needle was advanced to the epidural space which was confirmed with the loss of resistance technique at 5 cm. A catheter was then advanced easily into the epidural space. The catheter was left at 10 cm at the skin. Negative aspiration of blood and CSF was confirmed. A test dose of 1.5% lidocaine with 1:200,000 epinephrine was injected through the catheter and was negative for intravascular injection. The site was covered with sterile tegaderm and the catheter was secured with tape.

## 2019-07-26 NOTE — PROGRESS NOTES
"CNM PROGRESS NOTE    SUBJECTIVE:  Melina has been ambulating in the halls for the past 1-2 hours.  She states that she is feeing contractions every 3-5 minutes, states that contractions are feeling \"a little stronger\" but is able to ambulate and breathe through them.  Kevin is at bedside and has been ambulating with Melina.     OBJECTIVE:  /84   Pulse 85   Temp 98.1  F (36.7  C) (Temporal)   Resp 16   LMP 10/06/2018 (Approximate)     Fetal heart tones: Baseline 145   Variability: moderate  Accelerations: present  Decelerations: absent    Contractions: Pt is kimberly every 3-7 minutes, lasting  seconds and palpates mild to moderate    Cervix: 4/ 90% / 0/posterior, Vtx  ROM: clear fluid    Pitocin- none  Antibiotics- none  Cervical ripening: N/A    ASSESSMENT:  IUP @ 40w2d early labor, minimal cervical change  GBS- negative  ROM x 5.5 hrs     PLAN:   Counseled on expectant management vs augmentation of labor using IV pitocin due to minimal/no cervical change in 5 hrs.  Melina states that she would like to do expectant management for 1-2 more hours; agrees to Pitocin augmentation after 1-2 hours if indicated.  Counseled on pitocin augmentation R/B/SE.    Pain medication if requested, briefly discussed options  Anticipate   Labor augmentation with Pitocin if SVE remains the same in 2 hours  reevaluate in 2-3 hours/PRN    MOHSEN Cortes CNM    "

## 2019-07-26 NOTE — PROGRESS NOTES
CNM PROGRESS NOTE    SUBJECTIVE:  Melina is lying in bed, epidural infusing.  Kevin is at the bedside.  Melina states that she was feeling tired, contraction pain was rated 5/10, decided that she would like an epidural placed for pain relief prior to IV pitocin being started.     OBJECTIVE:  /84   Pulse 85   Temp 98.1  F (36.7  C) (Temporal)   Resp 16   LMP 10/06/2018 (Approximate)     Fetal heart tones: Baseline 140   Variability: moderate  Accelerations: present  Decelerations: absent    Contractions: Pt is kimberly every 5-7 minutes, lasting 50-80 seconds and palpates moderate    Cervix: deferred  ROM: clear fluid    Pitocin- none  Antibiotics- none  Cervical ripening: N/A    ASSESSMENT:  IUP @ 40w2d early labor   GBS- negative  ROM x 8.5 hrs     PLAN:   Frequent position changes and peanut ball to encourage fetal descent  Pain medication: epidural as requested  Anticipate   Labor augmentation with Pitocin d/t minimal labor progressin  reevaluate in 2-4 hours/PRN    MOHSEN Cortes CNM

## 2019-07-26 NOTE — TELEPHONE ENCOUNTER
"5:26 a.m. Paged the on call CNM, Hannabeatris Stallingses to call patient as she requested. The patient, before hanging up, said she would call 911. She said she wanted to put her pants on and go to the hospital. I encouraged 911 again. She said blood and large clots came gushing out of her when she got up to go to the bathroom. Denies any cramping. The flow was \"heavey like a period\".  Joi Narvaez RN-[facility]      Reason for Disposition    SEVERE vaginal bleeding (e.g., continuous red blood from vagina, large blood clots)    Additional Information    Negative: Passed out (i.e., lost consciousness, collapsed and was not responding)    Negative: Shock suspected (e.g., cold/pale/clammy skin, too weak to stand, low BP, rapid pulse)    Negative: Difficult to awaken or acting confused (e.g., disoriented, slurred speech)    Protocols used: PREGNANCY - VAGINAL BLEEDING GREATER THAN 20 WEEKS [id number]      "

## 2019-07-26 NOTE — PROGRESS NOTES
CNM PROGRESS NOTE    SUBJECTIVE:  Melina is in bed with epidural infusing.  She had epidural replaced because of lack of pain relief with previous epidural.  Melina states that she had good relief from current epidural.  She states that she is feeling pelvic pressure with contractions.  Kevin is at bedside.      OBJECTIVE:  /65   Pulse 85   Temp 98.1  F (36.7  C) (Axillary)   Resp 16   LMP 10/06/2018 (Approximate)   SpO2 99%     Fetal heart tones: Baseline 135   Variability: moderate  Accelerations: absent  Decelerations: present; intermittent early decels    Contractions: Pt is kimberly every 1.5-3 minutes, lasting  seconds and palpates strong    Cervix: 9.5/ 100% / +1, Vtx  ROM: clear fluid    Pitocin- 2 mu/min.  Antibiotics- none  Cervical ripening: N/A    ASSESSMENT:  IUP @ 40w2d active labor   GBS- negative  ROM x 11 hrs     PLAN:   Continue with position changes to encourage fetal descent  Anticipate   Labor augmentation with Pitocin will increase per protocol PRN  reevaluate in 1hour/PRN    MOHSEN Cortes CNM

## 2019-07-26 NOTE — PLAN OF CARE
"Pt arrived via stretcher/ambulance for complaints of vaginal bleeding.   at 40w2d.  Pt states she woke up at 0525 this morning and had a \"large gush of blood down my leg along with 2 clots.\"  Pt stated clots were comparable to quarter size.  Pad observed to have a small amount of dark, dried blood.  ROM+ collected.  Swab noted to be blood tinged.  Pt denies any significant medical history within or outside of this pregnancy.  Initial BP elevated, cycle every 10 min. Pt states she feels lower abdominal cramps that she rates a 3/10.  Pt denies HA, N, RUQ, or blurry vision.  Monitors applied with pt consent.    0610- IGLESIA Butts CNM at bedside, SVE 4/90/0 with bloody show    0625-ROM+postive, kimberly every 2-4min, BP WNL    0630- Orders entered, pt would like to have a waterbirth, FHT's with moderate variability, intermittent early decels, no accelerations noted, continue to monitor    0705-FHT's with accelerations      "

## 2019-07-26 NOTE — ANESTHESIA PREPROCEDURE EVALUATION
Anesthesia Pre-Procedure Evaluation    Patient: Melina Palacios   MRN: [id number] : 1995          Preoperative Diagnosis: * No surgery found *        Past Medical History:   Diagnosis Date     Depressive disorder      NY (generalized anxiety disorder)      IBS (irritable bowel syndrome)      Ovarian cyst      Past Surgical History:   Procedure Laterality Date     ENT SURGERY      wisdom teeth extraction      wisdom teeth         Anesthesia Evaluation       history and physical reviewed .             ROS/MED HX    ENT/Pulmonary:       Neurologic:       Cardiovascular:         METS/Exercise Tolerance:     Hematologic:         Musculoskeletal:         GI/Hepatic:     (+) Other GI/Hepatic IBS      Renal/Genitourinary:     (+) Other Renal/ Genitourinary, Ovarian Cyst      Endo:         Psychiatric:     (+) psychiatric history anxiety and depression      Infectious Disease:         Malignancy:         Other:                                 Lab Results   Component Value Date    WBC 9.0 2019    HGB 10.8 (L) 2019    HCT 34.1 (L) 2019     2019     2019    POTASSIUM 3.9 2019    CHLORIDE 108 2019    CO2 24 2019    BUN 5 (L) 2019    CR 0.48 (L) 2019    GLC 88 2019    CESAR 9.0 2019    ALBUMIN 2.7 (L) 2019    PROTTOTAL 6.9 2019    ALT 19 2019    AST 18 2019    ALKPHOS 203 (H) 2019    BILITOTAL 0.4 2019    TSH 1.780 2016       Preop Vitals  BP Readings from Last 3 Encounters:   19 125/84   19 120/78   19 104/66    Pulse Readings from Last 3 Encounters:   19 85   19 83   03/15/19 68      Resp Readings from Last 3 Encounters:   19 16   19 16   19 16    SpO2 Readings from Last 3 Encounters:   No data found for SpO2      Temp Readings from Last 1 Encounters:   19 36.7  C (98.1  F) (Temporal)    Ht Readings from Last 1 Encounters:   19 1.727  "m (5' 8\")      Wt Readings from Last 1 Encounters:   07/23/19 70.5 kg (155 lb 6.4 oz)    Estimated body mass index is 23.63 kg/m  as calculated from the following:    Height as of 5/9/19: 1.727 m (5' 8\").    Weight as of 7/23/19: 70.5 kg (155 lb 6.4 oz).       Anesthesia Plan      History & Physical Review      ASA Status:  2 .  OB Epidural Asa: 2       Plan for Epidural          Postoperative Care      Consents  Anesthetic plan, risks, benefits and alternatives discussed with:  Patient..                 Oscar Pisano,   "

## 2019-07-26 NOTE — ANESTHESIA PROCEDURE NOTES
Peripheral nerve/Neuraxial procedure note : epidural catheter  Pre-Procedure  Performed by William Kwong MD  Location: OB      Pre-Anesthestic Checklist: patient identified, IV checked, risks and benefits discussed, informed consent, pre-op evaluation and at physician/surgeon's request    Timeout  Correct Patient: Yes   Correct Procedure: Yes       Correct Position: Yes     .   Procedure Documentation    .    Procedure:    Epidural catheter.  Insertion Site:L3-4  (midline approach) Injection technique: LORT air   Local skin infiltrated with 3 mL of 1% lidocaine.  HUSAM at 4.5 cm     Patient Prep;mask, sterile gloves, povidone-iodine 7.5% surgical scrub, patient draped.  .  Needle: Touhy needle Needle Gauge: 17.    Needle Length (Inches) 3.5  # of attempts: 1 and # of redirects:  .   Catheter: 19 G . .  Catheter threaded easily  4 cm epidural space.  .   .    Assessment/Narrative  Paresthesias: No.  .  .  Aspiration negative for heme or CSF  . Test dose of 3 mL lidocaine 1.5% w/ 1:200,000 epinephrine at. Test dose negative for signs of intravascular, subdural or intrathecal injection. Comments:  This is the note for the epidural replacement.  The patient had right sided numbness with sparing on the left.  She has scoliosis, left sided.  It appears to be a right sided spread of local    Test dose of 3cc negative   Adhesive spray, tegaderm, and tape to secure  Bolused with 10cc of the pump mix

## 2019-07-26 NOTE — H&P
"[facility] Labor Admission History & Physical    Melina Palacios is a 23 year old  with an IUP at 40w2d  ; ,   Partner/support Person: Kevin  Language Barrier: English  [facility]  Provider: CNM's    Melina Palacios presented to [facility] at 0545 and is admitted to the Birthplace at [facility] on 2019 at 6:36 AM       History of present inllness/Chief Complaint:  Call back to Melina at 0530 after page by MARSHA. Meilna states waking at ~0330 to use the bathroom and everything was fine. Woke again at 0525 to use the bathroom and had a \"gush of blood and large clots come out\" Called MARSHA and was advised to call 911.  At the time of talking to Melina on the telephone the paramedics were arriving to her house.  Arrived via ambulance to [facility]  Here with: Cramping and vaginal bleeding  Patient reports contractions are Irregular and crampy  Baby active: At the time of speaking to Melina on the phone she was unsure if fetal movement was present.   Membranes are ruptured since ~0525 and verified with ROM +  Bloody show Yes   Any changes with medical history since last prenatal visit No    Obstetrical history  Estimated Date of Delivery: 2019 determined by early 1st trimester ultrasound  Patient's last menstrual period was 10/06/2018 (approximate).   Dating U/S: 01/10/2019    Fetal anatomic survey: Normal  Placenta: Anterior    PRENATAL COURSE  Prenatal care began at 17w6d (she was a transfer of care to [facility]) gestation for a total of 18 prenatal visits with [facility]  Total wt gain 29lb; 5'8\" BMI 19.2  Prenatal Blood Pressure: Has had some elevated BP during pregnancy. Pre-e labs previously WNL. Elevated BPs upon admission to [facility]. Prenatal course was complicated by    Patient Active Problem List    Diagnosis Date Noted     Pregnancy related condition 2019     Priority: Medium     Indication for care in labor or delivery 2019     Priority: " Medium     Supervision of high risk pregnancy in third trimester 2019     Priority: Medium     FOB:Kevin  SOPHIA: 2019 A+ Anterior/Boy!  Genetic:1st trimester screen  Tdap: 05/10/19    Flu: 18   GBS:     1 Hr GCT/Hgb: passed 110, 11.4  19 week transfer   History of NY-HAD/PHQ WNL @ 24W-ANXIETY  Wants H2O birth           Anxiety disorder affecting pregnancy, antepartum 2019     Priority: Medium     Tdap: 05/10/19  Rhogam: NA A+    Patient Active Problem List   Diagnosis     Supervision of high risk pregnancy in third trimester     Anxiety disorder affecting pregnancy, antepartum     Pregnancy related condition     Indication for care in labor or delivery       HISTORY  Allergies   Allergen Reactions     Azithromycin Nausea and Vomiting     In high school   Other reaction(s): GI Upset     Amoxicillin Hives and Rash     In high school      Past Medical History:   Diagnosis Date     Depressive disorder      NY (generalized anxiety disorder)      IBS (irritable bowel syndrome)      Ovarian cyst      Past Surgical History:   Procedure Laterality Date     ENT SURGERY      wisdom teeth extraction      wisdom teeth       Family History   Problem Relation Age of Onset     Cancer Mother      Breast Cancer Mother      Cancer Maternal Aunt      Breast Cancer Maternal Aunt      Thyroid Disease Maternal Grandmother      Breast Cancer Paternal Grandmother      Social History     Tobacco Use     Smoking status: Never Smoker     Smokeless tobacco: Never Used   Substance Use Topics     Alcohol use: No     Frequency: Never     OB History    Para Term  AB Living   1 0 0 0 0 0   SAB TAB Ectopic Multiple Live Births   0 0 0 0 0      # Outcome Date GA Lbr Allan/2nd Weight Sex Delivery Anes PTL Lv   1 Current                LABS:  Lab Results   Component Value Date    ABO A 2019    RH Pos 2019    AS Neg 2019    HGB 10.7 (L) 2019    HEPBANG neg 12/10/2018    CHPCRT neg 2018     GCPCRT neg 2018    RUBELLAABIGG 17.2 12/10/2018       GBS Status:   Lab Results   Component Value Date    GBS Negative 2019     Rubella: Immune    HIV: Non-Reactive   Platelets: 189    1hr GCT: 110     ROS   Pt is alert and oriented  Pt denies significant constitutional symptoms including fever and/or malaise.    Pt denies significant respiratory, cardiovacular, GI, or muscular/skeletal complaints.    Neuro: Denies HA and visual changes  Muscoloskeletal: Denies except for discomforts r/t pregnancy     PHYSICAL EXAM:  /85   Temp 97.9  F (36.6  C) (Temporal)   Resp 16   LMP 10/06/2018 (Approximate)   General appearance:  healthy, alert, active, mild distress, cooperative and anxious   Heart: RRR  Lungs: CTA bilaterally, normal respiratory effort  Abdomen: gravid, single vertex fetus, non-tender, EFW 7 lbs.     Contractions: Pt is kimberly every 2-4 minutes, lasting 50-80 seconds and palpates mild    Fetal heart tones: Baseline    Variability: moderate 140  Accelerations: present  Decelerations: intermittent early    NST: reactive    Cervix: 4/ 90% / Posterior/ average/ 0, Vtx  Bloody show: yes   Membranes:  Ruptured, clear fluid    ASSESSMENT:  23 year old  with trevino IUP 40w2d in early labor  NST reactive  GBS Negative and membranes ruptured since 525  Anxiety    PLAN:  Admit - see IP orders  Routine CNM care  Labs ordered: CBC, anti-treponema, T&S  Teaching done r/t comfort measures, pain management options, and labor processes  Pain medication: Planning unmedicated birth, but open to options. Considering water birth. Consent on file  Anticipate       MOHSEN Garcia CNM

## 2019-07-27 LAB — HGB BLD-MCNC: 7.7 G/DL (ref 11.7–15.7)

## 2019-07-27 PROCEDURE — 36415 COLL VENOUS BLD VENIPUNCTURE: CPT | Performed by: NURSE PRACTITIONER

## 2019-07-27 PROCEDURE — 25000132 ZZH RX MED GY IP 250 OP 250 PS 637: Performed by: NURSE PRACTITIONER

## 2019-07-27 PROCEDURE — 12000035 ZZH R&B POSTPARTUM

## 2019-07-27 PROCEDURE — 85018 HEMOGLOBIN: CPT | Performed by: NURSE PRACTITIONER

## 2019-07-27 RX ORDER — FERROUS GLUCONATE 324(38)MG
324 TABLET ORAL
Status: DISCONTINUED | OUTPATIENT
Start: 2019-07-28 | End: 2019-07-27

## 2019-07-27 RX ORDER — MULTIVIT WITH MINERALS/LUTEIN
250 TABLET ORAL DAILY
Status: DISCONTINUED | OUTPATIENT
Start: 2019-07-27 | End: 2019-07-28 | Stop reason: HOSPADM

## 2019-07-27 RX ORDER — FERROUS GLUCONATE 324(38)MG
324 TABLET ORAL
Status: DISCONTINUED | OUTPATIENT
Start: 2019-07-27 | End: 2019-07-28 | Stop reason: HOSPADM

## 2019-07-27 RX ADMIN — IBUPROFEN 800 MG: 400 TABLET ORAL at 06:22

## 2019-07-27 RX ADMIN — IBUPROFEN 800 MG: 400 TABLET ORAL at 21:17

## 2019-07-27 RX ADMIN — PRENATAL VIT W/ FE FUMARATE-FA TAB 27-0.8 MG 1 TABLET: 27-0.8 TAB at 08:21

## 2019-07-27 RX ADMIN — ACETAMINOPHEN 650 MG: 325 TABLET, FILM COATED ORAL at 15:33

## 2019-07-27 RX ADMIN — ACETAMINOPHEN 650 MG: 325 TABLET, FILM COATED ORAL at 22:54

## 2019-07-27 RX ADMIN — FERROUS GLUCONATE 324 MG: 324 TABLET ORAL at 17:00

## 2019-07-27 RX ADMIN — IBUPROFEN 800 MG: 400 TABLET ORAL at 12:30

## 2019-07-27 RX ADMIN — ASCORBIC ACID TAB 250 MG 250 MG: 250 TAB at 17:00

## 2019-07-27 RX ADMIN — SENNOSIDES AND DOCUSATE SODIUM 2 TABLET: 8.6; 5 TABLET ORAL at 21:17

## 2019-07-27 RX ADMIN — SENNOSIDES AND DOCUSATE SODIUM 1 TABLET: 8.6; 5 TABLET ORAL at 08:22

## 2019-07-27 RX ADMIN — ACETAMINOPHEN 650 MG: 325 TABLET, FILM COATED ORAL at 08:22

## 2019-07-27 RX ADMIN — Medication 1000 MCG: at 17:00

## 2019-07-27 NOTE — PLAN OF CARE
Pt doing well today. VSS. Voiding. Pain controlled with meds. HGB today was 7.7. Started on Iron. Slightly dizzy if she bends down and then is up too long. Madeline Sandra CNM aware and if patient feels more symptomatic may consider a dose of venofer. Breastfeeding with a shield. Lactation saw patient today.Willl continue to monitor.

## 2019-07-27 NOTE — L&D DELIVERY NOTE
Delivery Date:  2019      HISTORY OF PRESENT ILLNESS:  Melina is a 23-year-old  1, para 0, whose pregnancy was followed by the nurse midwives.  The patient is blood type A positive.  She is rubella immune and she is group B strep negative.  The patient has a history of anxiety disorder, but not on any medications.  The patient presented today at which time she was 40 weeks 2 days with spontaneous rupture of membranes at 5:25 a.m.  At that time the patient was found to be 4 cm, 90% effaced, and 0 station.  She had intermittent mild contractions throughout the entire day and made no progress and she was checked at 11:00 a.m. or at 3:00 p.m.  The patient had been offered Pitocin augmentation, but declined this throughout most of the day.  She then did finally agree to Pitocin but wished to have an epidural prior to receiving it.  The patient then received an epidural for pain control and was started on 2 milliunits per minute of Pitocin and then very quickly thereafter progressed to complete at 1750 hours.  Active labor onset time was documented as 12:55.  The patient did have some issues with her epidural and one time had to be repositioned and then did actually have it removed altogether and a new one placed and then did have adequate pain control; however, the patient was feeling a lot of pressure  fairly quickly after becoming complete and so did begin pushing at 1720 hours.  The patient pushed for approximately 3 hours with Madeline Sandra the nurse midwife and then out right declined to push any further  because she was exhausted and was requesting a  under general anesthesia.  At this point, Madeline called me to come for evaluation.  On her last check, there was caput at +2 station.  The fetal occiput was at +1 station.      Upon my arrival, I evaluated the patient and she had actually made quite a bit of progress in the 30 to 40 minutes that she had just been allowed to rest and labor  down.  The caput was at +3 and the head was at +2 station.  I discussed with the patient vacuum-assisted vaginal delivery with all the risks and benefits versus  section and the patient did wish to try the vacuum.  We did a catheterization for approximately 150 mL of clear urine.  Of note, she had been catheterized  2 other times during the second stage, 1 time for 200 mL and one time for 250 mL of clear urine.  We then applied the Mityvac mushroom to the fetal occiput.      Anesthesia Teams were immediately available and the  nurse practitioner was present in the room through 2 pulls with no pop-offs for a total accrued pulling time of 65 seconds, the vacuum was used to deliver the infant.  There was a compound presentation with the right fist up by the face.  The infant delivered easily and atraumatically and was placed on the maternal chest and abdomen.  Upon removal of the vacuum, and this was at 2108 hours.  Apgars were 8 and 9 at 1 and 5 minutes respectively and the weight is still pending at the time of this dictation.      The placenta then delivered intact with a normal 3-vessel cord at 2112 hours.  The patient did have some uterine atony noted and fundal massage was done to evacuate some clots.  During the course of her vaginal repair, there were a couple of instances where the patient would laugh and large clot would come out and so some additional massage was done and then finally one uterine manual uterine exploration was done to remove some clotting as well.  Her quantitative blood loss was 1254 mL.  She did finally have excellent uterine tone with just fundal massage and IV Pitocin.      The patient sustained a third-degree laceration.  Of note, the patient had an incredibly short perineal body with the lower labia majora basically extending almost to the anal verge.  There was a left vaginal sulcus tear as well as a right labial tear.  Using 2-0 Vicryl, the anal sphincter was  reapproximated on the left side and then across the top and midline.  Also using this same 2-0 Vicryl some deep suturing of the right labial laceration was done to bring the edges closer together.  Next, using 3-0 Vicryl 2 stitches were placed in the vaginal portion of the tear and then this was carried down through the vulva reapproximating it across the midline almost like a perineum.  The vulvar skin suturing was then done from the introitus down to the anal verge and then the same suture was run up from the anal verge back up to the introitus.  This suture was then brought into the vaginal vault and there was excellent reapproximation noted of the entire third-degree tear.  This with 3-0 Vicryl was then also continued up the right labial tear to reapproximate that as well.  Of note, the patient also had 10 mL of 1% plain lidocaine injected along the laceration line.  At the completion of the repair, there was excellent hemostasis noted and good cosmetic reapproximation.      The mother and infant were doing very well immediately after delivery and there were no complications.  Sponge, lap and instrument counts were correct x 2.      First stage of labor 4 hours and 20 minutes, second stage 3 hours and 53 minutes, third stage 4 minutes for a total of 8 hours and 17 minutes.      DIAGNOSES:   1.  Intrauterine pregnancy at 40+2 with premature rupture of membranes.   2.  Pitocin augmentation of labor with subsequent rapid active labor.   3.  Epidural for pain control.   4.  Vacuum-assisted vaginal delivery of a viable male infant.  Vacuum done for arrest of descent and maternal exhaustion.   5.  Third-degree perineal laceration as well as a left sulcus tear and right labial tear all repaired as above.         VU RAVI MD             D: 2019   T: 2019   MT: GEORGES      Name:     DALLAS JOHNSON   MRN:      [id number]        Account:        [id number]   :      1995        Delivery Date:   07/26/2019               Document: [id number]

## 2019-07-27 NOTE — L&D DELIVERY NOTE
Vacuum delivery of viable male at 2108   Vacuum performed for arrest of descent and maternal exhaustion   Vacuum on for 2 pulls/CTXs with 0 pop-offs.   Baby was in OA position, +2-3 station, EFW 7.5#   Alternative strategies were discussed.   Consent Obtained   Surgical and resuscitation teams were available.   Baby's weight unavailable.     Apgars 8, 9   Third degree tear of a very short perineum. Left sulcus tear and right labial tear.   Third degree repaired with 2-0 vicryl and the remainder with 3-0 vicryl

## 2019-07-27 NOTE — PLAN OF CARE
Patient arrived to room 422 at 0015 via wheel chair with infant in arms. Report received from Althea Armstrong RN. ID bands double cheked  Patient and family oriented to the room and floor. Call light within reach. Education material given and discussed. Infant safety demonstrated. Patient encouraged to call with questions/concerns. Will continue to monitor.

## 2019-07-27 NOTE — PROVIDER NOTIFICATION
07/26/19 2030   Provider Notification   Provider Name/Title Madeline Sandra CNM   Method of Notification At Bedside   Request   (on Phone with Dr. Harrison)   Dr. Harrison updated and will come to hospital to evaluate after 3 hours of pushing

## 2019-07-27 NOTE — PLAN OF CARE
Patient is admitted from triage at 0730 to room 213 and report is received from Renate El RN.  The patient is wanting to have a water birth but states she is open to changing her plan of labor care as she proceeds into labor.  The patient is ambulating on the unit to augment labor.  The patient is having minimal discomfort upon admission.  FELIPA Waterman greets the patient and  on arrival and places orders.  FELIPA Irizarry arrives on the Unit at 1030 and waits for the patient to finish her walk and she checks the patient at 1100.  Madeline discusses augmenting labor with Pitocin. The patient agrees to starting Pitocin after she takes a shower and has a light lunch.  The patient states she wants a epidural at 1250 and a IV fluid bolus is started.  The patient is educated on the epidural procedure, the pitocin will be started after the patient is comfortable with the epidural.  Dr. Oscar Pisano arrives at the bedside at 1320 to explain the procedure and the consent is signed at 1325.  Madeline Sandra arrives at the bedside at 1330.  The patient starts having pain relief but is feeling the oconnor catheter and the cold in periurethral area.  The patient started having left sided pain and the spinal level is checked.  The patient feels cold on the whole left side of her body with the spinal level assessment.  The anesthesiologist is notified of the one sided epidural.  Dr. Priest adjust the catheter and gives another epidural bolus.  The patient had temporary relief and then she complained of back and left sided discomfort.  Dr. Kwong replaces the epidural.  The patient is now comfortable after the third attempt.  Madeline checks the cervix at 1715 and the patient is complete.  The oconnor catheter is removed and the patient starts pushing at 1720.  Report is given to Althea Armstrong RN at 1915.

## 2019-07-27 NOTE — PLAN OF CARE
Vital signs stable. Fundus firm, scant lochia.Patient voiding without difficulty, encouraged with drink fluid. Able to ambulate in room free of dizziness. Taking tylenol/ibuprofen for pain management. Working on breastfeeding infant every 2-3 hours. Encouraged to call with questions/concerns. Will continue to monitor.

## 2019-07-27 NOTE — PLAN OF CARE
Patient up to bathroom with assist of 2 RNs. Patient unable to void while on toilet. Patient lightheaded, nauseous and increased heart rate. Tucks and ice pad placed and patient quickly placed in wheelchair with assist of 2 RNs. Cool cloth placed on patient's forehead and patient feeling better. Will transport to postpartum.

## 2019-07-27 NOTE — LACTATION NOTE
Initial visit with Melina, LYNDSEY and baby.    Breastfeeding general information reviewed.   Advised to breastfeed exclusively, on demand, avoid pacifiers, bottles and formula unless medically indicated.  Encouraged rooming in, skin to skin, feeding on demand 8-12x/day or sooner if baby cues.  Explained benefits of holding and skin to skin.  Encouraged lots of skin to skin. Instructed on hand expression.   Asked to see regarding questions about concerns over milk supply and latching baby.  Baby is 14.5  hours old when seen today.  He latched and suckled well soon after birth, but has been sleepy most of the time since.  Currently he is latched and suckling intermittently, no swallowing noted.  Mom is concerned that she does not have enough milk.  She also is experiencing nipple pain  And reports that when baby finished feeding earlier, she noticed her nipples were creased and painful.  We reviewed general breastfeeding information.  Explained how milk supply is established and maintained.  Showed how to position baby so that h is able to latch deeply.  Repositioned baby and nipple discomfort decreased.  Showed parents how to identify and correct a poor latch.    Encouraged frequent ad jennie feedings to equal 8-12 feedings/24 hours and fill out feeding log to keep track of number of times fed.Has a breast  Pump at home and Outpatient resource phone numbers given.   No further questions at this time.   Will follow as needed.   Lucy Lund BSN, RN, PHN, RNC-MNN, IBCLC

## 2019-07-27 NOTE — PLAN OF CARE
Data: Melina Palacios transferred to 422 via wheelchair at 0015. Baby transferred via crib.  Action: Receiving unit notified of transfer: Yes. Patient and family notified of room change. Report given to Araseli at 0020. Belongings sent to receiving unit. Accompanied by Registered Nurse. Oriented patient to surroundings. Call light within reach. ID bands double-checked with receiving RN.  Response: Patient tolerated transfer and is stable.

## 2019-07-27 NOTE — PLAN OF CARE
:SBAR/bedside report from Madalyn Arroyo RN. Will assume all cares for this patient.  : Patient pushing with encouragement. Patient getting frustrated with pushing process. Patient teary-eyed and expressing desire for . Patient encouraged to continue pushing.  : Patient continues to be frustrated, teary-eyed and angry that pushing process has taken 3 hours. Patient placed in right tilt, bed put back together and Madeline CNM out at nurse's desk to consult with Dr. Harrison.  : Dr. Harrison at bedside to evaluate. Baby at +2 station. Dr. Harrison straight cathed patient. Dr. Harrison counseled patient on vacuum process. Charge nurse and NNP called to come to room for VAVD.  :Vacuum applied. Vacuum used with 2 contractions, 2 pulls with total time of 65 seconds.  :Viable male born with right hand by face resulting in 3rd degree laceration.  : Placenta delivered and Pitocin started per protocol. Will begin postpartum recovery.

## 2019-07-27 NOTE — PROGRESS NOTES
"CNM PROGRESS NOTE    SUBJECTIVE:  Melina has been actively pushing in multiple positions since she was found to be complete at 1720.  She states that she is \"so tired\" and \"doesn't want to push anymore\".  She is crying and asking for evaluation for possible operative delivery.  RN, spouse and CNM has been providing continuous labor support since 1720.    OBJECTIVE:  /79   Pulse 116   Temp 100  F (37.8  C) (Temporal)   Resp 16   LMP 10/06/2018 (Approximate)   SpO2 99%     Fetal heart tones: Baseline 150   Variability: minimal to moderate  Accelerations: absent  Decelerations: present, early and variable     Contractions: Pt is kimberly every 2-5 minutes, lasting 40-50 seconds and palpates moderate to strong    Cervix: 10/ 100% / +2 (caput), Vtx  ROM: clear fluid    Pitocin- 8 mu/min.  Antibiotics- none  Cervical ripening: N/A    ASSESSMENT:  IUP @ 40w2d second stage labor and minimal/no progress, minimal descent  GBS- negative  ROM x 15 hours     PLAN:   Consulted Dr. Hunter STEVENS; plan for eval for operative delivery d/t prolonged 2nd stage, minimal fetal descent  Pain medication epidural infusing  Will labor down, allow rest until MD evaluation  Continue to provide continuous support until MD evaluation    Madeline Sandra, MOHSEN BLEVINSM    "

## 2019-07-27 NOTE — PROGRESS NOTES
"[facility] Progress Note: Postpartum Day #1    July 27, 2019  12:06 PM    SUBJECTIVE:  Patient is stable and is tolerating acitivity well  Baby is rooming in  Complications since 2 hours post delivery: None  Pain is well controlled.  Patient is taking pain medications tylenol and ibuprofen.  Breastfeeding status:initiated   Elimination:  She is voiding without difficulty.  She has not had a bowel movement  Desired contraception unsure  Denies heavy bleeding and passing large clots.  Feels ok about birth experience; states that she is happy that he is here and she has been able to sleep.  She denies any dizziness, SOB with standing and ambulation; states that she feels \"tired\" if she stands for too long.     OBJECTIVE:  /77 (BP Location: Left arm)   Pulse 67   Temp 98.4  F (36.9  C) (Oral)   Resp 16   LMP 10/06/2018 (Approximate)   SpO2 99%   Breastfeeding? Unknown     Constitutional: healthy, alert, no distress and pale    Breasts: Currently breastfeeding    Fundus: Uterine fundus is firm, non-tender and at 1FB below the level of the umbilicus per RN flowsheet    Perineum: Perineum is well approximated, minimal swelling    Lochia: Lochia is appropriate for the duration of time since delivery.     ASSESSMENT:  PPD #1  Vacuum assisted vaginal delivery  Doing well.  No excessive bleeding  Pain well-controlled.  Anemia d/t acute blood loss; asymptomatic  PPH with QBL 1254  Hemoglobin   Date Value Ref Range Status   07/26/2019 10.8 (L) 11.7 - 15.7 g/dL Final   ]      PLAN:  Continue routine care  Ambulation encouraged  Iron supplementation; will plan on PO iron for Hgb 7-10, Venofer if Hgb <7.  Counseled on option for IV Venofer if HGB <8, declined at this time.  Would like to start PO iron.  Breast feeding strategies discussed  Lactation consultation  Reviewed breastfeeding  Reviewed postpartum warning signs  Reviewed postpartum blues and postpartum depression warning signs  Plans unsure for " contraception postpartum  Anticipated discharge 7/28/19        MOHSEN Cortes CNM

## 2019-07-27 NOTE — ANESTHESIA POSTPROCEDURE EVALUATION
Patient: Melina Palacios    * No procedures listed *    Diagnosis:* No pre-op diagnosis entered *  Diagnosis Additional Information: No value filed.    Anesthesia Type:  No value filed.    Note:  Anesthesia Post Evaluation    Patient location during evaluation: Bedside and Floor  Patient participation: Able to fully participate in evaluation     Anesthetic complications: None    Comments: The patient was satisfied with her labor epidural and had no complaints. She is able to ambulate and denies urinary or bowel complaints.         Last vitals:  Vitals:    07/27/19 0100 07/27/19 0500 07/27/19 0936   BP: 128/80 113/67 117/77   Pulse: 82 67    Resp: 16 16 16   Temp: 36.8  C (98.3  F) 36.6  C (97.8  F) 36.9  C (98.4  F)   SpO2:            Electronically Signed By: Miguel Hairston MD  July 27, 2019  4:35 PM

## 2019-07-28 VITALS
SYSTOLIC BLOOD PRESSURE: 111 MMHG | DIASTOLIC BLOOD PRESSURE: 65 MMHG | TEMPERATURE: 97.9 F | OXYGEN SATURATION: 99 % | RESPIRATION RATE: 16 BRPM | HEART RATE: 80 BPM

## 2019-07-28 LAB — HGB BLD-MCNC: 7.7 G/DL (ref 11.7–15.7)

## 2019-07-28 PROCEDURE — 36415 COLL VENOUS BLD VENIPUNCTURE: CPT | Performed by: NURSE PRACTITIONER

## 2019-07-28 PROCEDURE — 25000132 ZZH RX MED GY IP 250 OP 250 PS 637: Performed by: NURSE PRACTITIONER

## 2019-07-28 PROCEDURE — 85018 HEMOGLOBIN: CPT | Performed by: NURSE PRACTITIONER

## 2019-07-28 RX ORDER — FERROUS GLUCONATE 324(38)MG
324 TABLET ORAL
Qty: 14 TABLET | Refills: 0 | Status: SHIPPED | OUTPATIENT
Start: 2019-07-29 | End: 2019-08-16

## 2019-07-28 RX ORDER — LANOLIN 100 %
OINTMENT (GRAM) TOPICAL
Qty: 1 G | COMMUNITY
Start: 2019-07-28 | End: 2019-10-11

## 2019-07-28 RX ORDER — AMOXICILLIN 250 MG
1 CAPSULE ORAL 2 TIMES DAILY PRN
Qty: 28 TABLET | Refills: 0 | Status: SHIPPED | OUTPATIENT
Start: 2019-07-28 | End: 2019-08-16

## 2019-07-28 RX ORDER — IBUPROFEN 600 MG/1
600 TABLET, FILM COATED ORAL EVERY 6 HOURS PRN
Qty: 60 TABLET | Refills: 1 | Status: SHIPPED | OUTPATIENT
Start: 2019-07-28 | End: 2019-08-16

## 2019-07-28 RX ORDER — ACETAMINOPHEN 325 MG/1
650 TABLET ORAL EVERY 4 HOURS PRN
Qty: 1 BOTTLE | Refills: 0 | Status: SHIPPED | OUTPATIENT
Start: 2019-07-28 | End: 2019-08-16

## 2019-07-28 RX ADMIN — ASCORBIC ACID TAB 250 MG 250 MG: 250 TAB at 08:30

## 2019-07-28 RX ADMIN — FERROUS GLUCONATE 324 MG: 324 TABLET ORAL at 08:30

## 2019-07-28 RX ADMIN — IBUPROFEN 800 MG: 400 TABLET ORAL at 09:57

## 2019-07-28 RX ADMIN — SENNOSIDES AND DOCUSATE SODIUM 1 TABLET: 8.6; 5 TABLET ORAL at 08:31

## 2019-07-28 RX ADMIN — IBUPROFEN 800 MG: 400 TABLET ORAL at 03:12

## 2019-07-28 RX ADMIN — ACETAMINOPHEN 650 MG: 325 TABLET, FILM COATED ORAL at 05:54

## 2019-07-28 RX ADMIN — Medication 1000 MCG: at 08:31

## 2019-07-28 NOTE — LACTATION NOTE
Routine visit with Kevin Magallanes and baby.  Baby had a BM and void prior tot his feeding, which Kevin changed the diaper and undressed Hira.  Baby latched onto the right breast and took one to two suckles.  Currently has the hiccups and holding nipple in mouth ,  Hira pulled himself off after a few minutes.  Melina able to hand express gtts to Hira.  Latched once more and then immediately pulled himself off and fell asleep with mouth agape.  Skin to skin with mother. Instructed to hand express. Will place to breast again in 30 minutes, skin to skin until then.  No further questions at this time.  Getting ready for discharge.  Plan: Watch for feeding cues and feed every 2-3 hours and/or on demand. Continue to use feeding log to track intake and appropriate voids and stools. Take feeding log to first follow up appointment or weight check. Encourage skin to skin to promote frequent feedings, thermoregulation and bonding. Follow-up with healthcare provider or lactation consultant for questions or concerns.    Lucy Lund BSN, RN, PHN, RNC-MNN, IBCLC

## 2019-07-28 NOTE — PLAN OF CARE
Pt doing well. VSS. Up ambulating and voiding well. When up for an extended amount of time she feels slightly dizzy. HGB today was 7.7 and unchanged from yesterday. Madeline BLEVINSM aware of results and no new orders received. Patient was offered venofer and refused. Going home on oral Iron. Also had a few episodes today of diarrhea and Madeline aware as well and was instructed to not take juan j softners for now. Breastfeeding going well with shield at this time but baby has been sleeper the last few feedings. All discharge teaching and instructions gone over. All patients questions answered at this time. Bands checked at discharge.

## 2019-07-28 NOTE — LACTATION NOTE
"Routine visit. Melina is currently breast feeding on the right with a 24 mm shield and she is wincing and says \"if this is how painful breastfeeding is going to be then I won't be able to continue\".  24mm shield correct size for the left nipple and a 20mm give for the right breast, Melina states this is so much better.  No further questions at this time.  Lucy Lund BSN, RN, PHN, RNC-MNN, IBCLC   "

## 2019-07-28 NOTE — DISCHARGE SUMMARY
[facility]    Discharge Summary  Obstetrics    Date of Admission:  2019  Date of Discharge:  2019  Discharging Provider: Madeline Sandra  Date of Service (when I saw the patient): 19    Discharge Diagnoses   S/p VAVD  Anemia d/t acute blood loss  3rd degree laceration repair    History of Present Illness   Melina Palacios is a 23 year old female who presented with SROM on 19.  Melina had an ineffective contraction pattern and had pitocin augmentation.  She progressed to complete 2hr after pitocin started.  She actively pushed for 3 hours with minimal fetal descent, Dr. Harrison was consulted for operative delivery options.  She had VAVD and had a 3rd degree laceration with PPH <1000cc.  Bleeding has been well controlled since delivery.  She is currently taking PO iron supplements with vitamin B12 and vitamin C; Melina declined IV Venofer.  She has been asymptomatic with her anemia.      Hospital Course   The patient's hospital course was unremarkable.  She recovered as anticipated and experienced no post-delivery complications. On discharge, her pain was well controlled. Vaginal bleeding is stable.  Voiding without difficulty.  Ambulating well and tolerating a normal diet.  No fever.  Breastfeeding well.  Infant is stable.  She was discharged on post-partum day #2.    Post-partum hemoglobin:   Hemoglobin   Date Value Ref Range Status   2019 7.7 (L) 11.7 - 15.7 g/dL Final       Madeline Sandra    Discharge Disposition   Discharged to home   Condition at discharge: Stable    Primary Care Physician   Physician No Ref-Primary    Consultations This Hospital Stay   ANESTHESIOLOGY IP CONSULT  HOME CARE POST PARTUM/ IP CONSULT  LACTATION IP CONSULT    Discharge Orders      Activity    Review discharge instructions     Reason for your hospital stay    Maternity care     Discharge Instructions - Postpartum visit    Schedule postpartum visit with your provider and  return to clinic in 2 and 6 weeks.     Follow Up and recommended labs and tests    Follow up with me or any midwife within 2 weeks. for hospital follow- up.  The following labs/tests are recommended: hemoglobin, GAD7, PHQ9.     Diet    Resume previous diet     Discharge Medications   Current Discharge Medication List      START taking these medications    Details   acetaminophen (TYLENOL) 325 MG tablet Take 2 tablets (650 mg) by mouth every 4 hours as needed for mild pain or fever (greater than or equal to 38  C /100.4  F (oral) or 38.5  C/ 101.4  F (core).)  Qty: 1 Bottle, Refills: 0    Associated Diagnoses: Anemia due to blood loss, acute; Vacuum extractor delivery, delivered; Lactating mother      cyanocobalamin (VITAMIN B-12) 500 MCG SUBL sublingual tablet Place 2 tablets (1,000 mcg) under the tongue daily for 14 days  Qty: 28 tablet, Refills: 0    Associated Diagnoses: Anemia due to blood loss, acute; Other immediate postpartum hemorrhage      ferrous gluconate (FERGON) 324 (38 Fe) MG tablet Take 1 tablet (324 mg) by mouth daily (with breakfast)  Qty: 14 tablet, Refills: 0    Associated Diagnoses: Anemia due to blood loss, acute; Other immediate postpartum hemorrhage      ibuprofen (ADVIL/MOTRIN) 600 MG tablet Take 1 tablet (600 mg) by mouth every 6 hours as needed for other (cramping)  Qty: 60 tablet, Refills: 1    Associated Diagnoses: Anemia due to blood loss, acute; Vacuum extractor delivery, delivered; Lactating mother      lanolin ointment Apply topically every hour as needed for dry skin (sore nipples)  Qty: 1 g    Associated Diagnoses: Anemia due to blood loss, acute; Vacuum extractor delivery, delivered; Lactating mother      senna-docusate (SENOKOT-S/PERICOLACE) 8.6-50 MG tablet Take 1 tablet by mouth 2 times daily as needed for constipation  Qty: 28 tablet, Refills: 0    Associated Diagnoses: Anemia due to blood loss, acute; Vacuum extractor delivery, delivered; Lactating mother      vitamin C  (ASCORBIC ACID) 250 MG TABS tablet Take 1 tablet (250 mg) by mouth daily for 14 days  Qty: 14 tablet, Refills: 0    Associated Diagnoses: Anemia due to blood loss, acute; Other immediate postpartum hemorrhage         CONTINUE these medications which have NOT CHANGED    Details   Jace w/o [id number] (PNV-DHA PO)            Allergies   Allergies   Allergen Reactions     Azithromycin Nausea and Vomiting     In high school   Other reaction(s): GI Upset     Amoxicillin Hives and Rash     In high school

## 2019-07-28 NOTE — PROGRESS NOTES
CNM Postpartum Discharge Note    SIGNIFICANT PROBLEMS:  Patient Active Problem List    Diagnosis Date Noted     Pregnancy related condition 07/26/2019     Priority: Medium     Indication for care in labor or delivery 07/26/2019     Priority: Medium     Vacuum extractor delivery, delivered 07/26/2019     Priority: Medium     Third degree laceration of perineum during delivery, postpartum 07/26/2019     Priority: Medium     Lactating mother 07/26/2019     Priority: Medium     Other immediate postpartum hemorrhage 07/26/2019     Priority: Medium     Supervision of high risk pregnancy in third trimester 03/05/2019     Priority: Medium     FOB:Kevin  SOPHIA: Jul 24, 2019 A+ Anterior/Boy!  Genetic:1st trimester screen  Tdap: 05/10/19    Flu: 11/16/18   GBS:     1 Hr GCT/Hgb: passed 110, 11.4  19 week transfer   History of [place]-HAD/PHQ WNL @ 24W-ANXIETY  Wants H2O birth           Anxiety disorder affecting pregnancy, antepartum 03/05/2019     Priority: Medium     [place] (generalized anxiety disorder) 08/02/2018     Priority: Medium     Irritable bowel syndrome with diarrhea 08/02/2018     Priority: Medium         SUBJECTIVE:  Patient is stable and is tolerating acitivity well  Baby is rooming in  Complications since 2 hours post delivery: None  Pain is well controlled.  Patient is taking pain medications.  Breastfeeding status:initiated and well established   Elimination:  She is voiding without difficulty.  She has had a bowel movement.  States that she has had 2 episodes of diarrhea  Desired contraception Unsure  Denies heavy bleeding and passing large clots.    INTERVAL HISTORY:  /65   Pulse 80   Temp 97.9  F (36.6  C)   Resp 16   LMP 10/06/2018 (Approximate)   SpO2 99%   Breastfeeding? Unknown     Constitutional: healthy, alert, no distress and pale    Breasts: Currently breastfeeding    Fundus: Uterine fundus is firm, non-tender and at 1FB below the level of the umbilicus    Perineum: Perineum is  well  approximated, minimal swelling per RN flowsheet    Lochia: Lochia is appropriate for the duration of time since delivery.     Postpartum hemoglobin   Hemoglobin   Date Value Ref Range Status   07/28/2019 7.7 (L) 11.7 - 15.7 g/dL Final     Blood type   Lab Results   Component Value Date    ABO A 07/26/2019       Lab Results   Component Value Date    RH Pos 07/26/2019     Rubella status   Lab Results   Component Value Date    RUBELLAABIGG 17.2 12/10/2018     History of depression:  yes with [place]    ASSESSMENT/PLAN:  Normal postpartum course  Stable Post-partum day #2  Complications:anemic, plan for iron supplementation and high risk for postpartum depression  HGB stable at 7.7.  IV Venofer offered, declined at this time; not symptomatic  Postpartum warning s/s reviewed, including bleeding/clots, worsening anemia, fever, mastitis & thromboemboli   Counseled to hold stool softener for diarrhea/loose stools  Exercise, diet and rest reviewed  PP Kegels/crunches reviewed  Continue prenatal vitamins while breastfeeding  Birthcontrol planned:Undecided. Fertility and contraception options reviewed.  Educated on postpartum blues and postpartum depression warnings signs/symptoms  2 week follow up appointment with CNM for hemoglobin check, GAD7 and PHQ9  Follow-up in 2 and 6 weeks with CNMs at [facility]  Plan d/c home today    Current Discharge Medication List      START taking these medications    Details   acetaminophen (TYLENOL) 325 MG tablet Take 2 tablets (650 mg) by mouth every 4 hours as needed for mild pain or fever (greater than or equal to 38  C /100.4  F (oral) or 38.5  C/ 101.4  F (core).)  Qty: 1 Bottle, Refills: 0    Associated Diagnoses: Anemia due to blood loss, acute; Vacuum extractor delivery, delivered; Lactating mother      cyanocobalamin (VITAMIN B-12) 500 MCG SUBL sublingual tablet Place 2 tablets (1,000 mcg) under the tongue daily for 14 days  Qty: 28 tablet, Refills: 0     Associated Diagnoses: Anemia due to blood loss, acute; Other immediate postpartum hemorrhage      ferrous gluconate (FERGON) 324 (38 Fe) MG tablet Take 1 tablet (324 mg) by mouth daily (with breakfast)  Qty: 14 tablet, Refills: 0    Associated Diagnoses: Anemia due to blood loss, acute; Other immediate postpartum hemorrhage      ibuprofen (ADVIL/MOTRIN) 600 MG tablet Take 1 tablet (600 mg) by mouth every 6 hours as needed for other (cramping)  Qty: 60 tablet, Refills: 1    Associated Diagnoses: Anemia due to blood loss, acute; Vacuum extractor delivery, delivered; Lactating mother      lanolin ointment Apply topically every hour as needed for dry skin (sore nipples)  Qty: 1 g    Associated Diagnoses: Anemia due to blood loss, acute; Vacuum extractor delivery, delivered; Lactating mother      senna-docusate (SENOKOT-S/PERICOLACE) 8.6-50 MG tablet Take 1 tablet by mouth 2 times daily as needed for constipation  Qty: 28 tablet, Refills: 0    Associated Diagnoses: Anemia due to blood loss, acute; Vacuum extractor delivery, delivered; Lactating mother      vitamin C (ASCORBIC ACID) 250 MG TABS tablet Take 1 tablet (250 mg) by mouth daily for 14 days  Qty: 14 tablet, Refills: 0    Associated Diagnoses: Anemia due to blood loss, acute; Other immediate postpartum hemorrhage         CONTINUE these medications which have NOT CHANGED    Details   Jace w/o [id number] (PNV-DHA PO)              MOHSEN Cortes CNM

## 2019-07-28 NOTE — PLAN OF CARE
Fundus firm and bleeding wnl.  VSS.  Voiding without difficulty.  Taking tylenol and ibuprofen every 4-6 hrs with good relief.  Up independently. Showered.  Using ice and tucks.  Nipple shield used for flat nipples.  Nipples tender, lanolin given.  Encouraged to call with questions or concerns.  Will continue to monitor.

## 2019-07-29 ENCOUNTER — TELEPHONE (OUTPATIENT)
Dept: OBGYN | Facility: CLINIC | Age: 24
End: 2019-07-29

## 2019-07-29 NOTE — TELEPHONE ENCOUNTER
VAVD 7/26/19. Pt was discharged with swelling in her legs and feet. Pt has noticed that right foot is more swollen than left.  Pt denies pain or redness. No pain when bearing weight. Pt instructed to keep feet elevated. Keep well hydrated.  Monitor for now but if swelling in right continues to increase pt needs to go to ED for doppler studies.

## 2019-08-08 ENCOUNTER — PRENATAL OFFICE VISIT (OUTPATIENT)
Dept: MIDWIFE SERVICES | Facility: CLINIC | Age: 24
End: 2019-08-08
Payer: COMMERCIAL

## 2019-08-08 VITALS
WEIGHT: 139.2 LBS | DIASTOLIC BLOOD PRESSURE: 66 MMHG | SYSTOLIC BLOOD PRESSURE: 100 MMHG | BODY MASS INDEX: 21.1 KG/M2 | HEIGHT: 68 IN

## 2019-08-08 DIAGNOSIS — D62 ANEMIA DUE TO BLOOD LOSS, ACUTE: ICD-10-CM

## 2019-08-08 DIAGNOSIS — R39.89 POSSIBLE URINARY TRACT INFECTION: ICD-10-CM

## 2019-08-08 LAB
ALBUMIN UR-MCNC: NEGATIVE MG/DL
APPEARANCE UR: CLEAR
BACTERIA #/AREA URNS HPF: ABNORMAL /HPF
BILIRUB UR QL STRIP: NEGATIVE
COLOR UR AUTO: YELLOW
GLUCOSE UR STRIP-MCNC: NEGATIVE MG/DL
HGB BLD-MCNC: 10.6 G/DL (ref 11.7–15.7)
HGB UR QL STRIP: ABNORMAL
KETONES UR STRIP-MCNC: NEGATIVE MG/DL
LEUKOCYTE ESTERASE UR QL STRIP: ABNORMAL
NITRATE UR QL: NEGATIVE
NON-SQ EPI CELLS #/AREA URNS LPF: ABNORMAL /LPF
PH UR STRIP: 6.5 PH (ref 5–7)
RBC #/AREA URNS AUTO: ABNORMAL /HPF
SOURCE: ABNORMAL
SP GR UR STRIP: 1.01 (ref 1–1.03)
UROBILINOGEN UR STRIP-ACNC: 0.2 EU/DL (ref 0.2–1)
WBC #/AREA URNS AUTO: ABNORMAL /HPF

## 2019-08-08 PROCEDURE — 81001 URINALYSIS AUTO W/SCOPE: CPT | Performed by: ADVANCED PRACTICE MIDWIFE

## 2019-08-08 PROCEDURE — 36415 COLL VENOUS BLD VENIPUNCTURE: CPT | Performed by: ADVANCED PRACTICE MIDWIFE

## 2019-08-08 PROCEDURE — 87086 URINE CULTURE/COLONY COUNT: CPT | Performed by: ADVANCED PRACTICE MIDWIFE

## 2019-08-08 PROCEDURE — 00000218 ZZHCL STATISTIC OBHBG - HEMOGLOBIN: Performed by: ADVANCED PRACTICE MIDWIFE

## 2019-08-08 PROCEDURE — 99024 POSTOP FOLLOW-UP VISIT: CPT | Performed by: ADVANCED PRACTICE MIDWIFE

## 2019-08-08 ASSESSMENT — PATIENT HEALTH QUESTIONNAIRE - PHQ9
5. POOR APPETITE OR OVEREATING: NOT AT ALL
SUM OF ALL RESPONSES TO PHQ QUESTIONS 1-9: 3

## 2019-08-08 ASSESSMENT — ANXIETY QUESTIONNAIRES
2. NOT BEING ABLE TO STOP OR CONTROL WORRYING: SEVERAL DAYS
5. BEING SO RESTLESS THAT IT IS HARD TO SIT STILL: NOT AT ALL
GAD7 TOTAL SCORE: 3
IF YOU CHECKED OFF ANY PROBLEMS ON THIS QUESTIONNAIRE, HOW DIFFICULT HAVE THESE PROBLEMS MADE IT FOR YOU TO DO YOUR WORK, TAKE CARE OF THINGS AT HOME, OR GET ALONG WITH OTHER PEOPLE: SOMEWHAT DIFFICULT
7. FEELING AFRAID AS IF SOMETHING AWFUL MIGHT HAPPEN: NOT AT ALL
3. WORRYING TOO MUCH ABOUT DIFFERENT THINGS: NOT AT ALL
1. FEELING NERVOUS, ANXIOUS, OR ON EDGE: SEVERAL DAYS
6. BECOMING EASILY ANNOYED OR IRRITABLE: SEVERAL DAYS

## 2019-08-08 ASSESSMENT — MIFFLIN-ST. JEOR: SCORE: 1429.91

## 2019-08-08 NOTE — RESULT ENCOUNTER NOTE
Please let Melina know that her hgb has increased. She can stop taking her oral iron supplements. Please have her call with questions or concerns. - Rohini

## 2019-08-08 NOTE — PROGRESS NOTES
"  SUBJECTIVE:                                                   Melina Palacios is a 23 year old who presents to clinic today for the following health issue(s):  Patient presents with:  Post Partum Exam: patient states she might have a UTI, frequency and maybe burning but not sure if burning from her stitches.      HPI:  Melina is doing well but feeling a little bit isolated. Agrees to fill out PHQ9. Feels like anxiety diagnosis is old but she does feel like she is having a little anxiety. Breastfeeding is going well. Wondering how her blood count is, denies s/sx of anemia. Also has a question about her repair. Was looking with a mirror and it appears like she has an extra \"hole.\"    Patient's last menstrual period was 10/06/2018 (approximate).  Menstrual History: 2 WEEKS POSTPARTUM  Patient is not sexually active  .  Using none for contraception.   Health maintenance updated:  yes  STI infx testing offered:  Declined    Last PHQ-9 score on record =   PHQ-9 SCORE 2019   PHQ-9 Total Score 3     Last GAD7 score on record =   [place]-7 SCORE 2019   Total Score 3     Alcohol Score = 0    Problem list and histories reviewed & adjusted, as indicated.  Additional history: as documented.    Patient Active Problem List   Diagnosis     Supervision of high risk pregnancy in third trimester     Anxiety disorder affecting pregnancy, antepartum     Pregnancy related condition     Vacuum extractor delivery, delivered     Third degree laceration of perineum during delivery, postpartum     Lactating mother     Other immediate postpartum hemorrhage     NY (generalized anxiety disorder)     Irritable bowel syndrome with diarrhea     Past Surgical History:   Procedure Laterality Date     ENT SURGERY      wisdom teeth extraction      wisdom teeth        Social History     Tobacco Use     Smoking status: Never Smoker     Smokeless tobacco: Never Used   Substance Use Topics     Alcohol use: No     Frequency: Never      " "Problem (# of Occurrences) Relation (Name,Age of Onset)    Breast Cancer (3) Mother, Maternal Aunt, Paternal Grandmother    Cancer (2) Mother, Maternal Aunt    Thyroid Disease (1) Maternal Grandmother            Current Outpatient Medications   Medication Sig     acetaminophen (TYLENOL) 325 MG tablet Take 2 tablets (650 mg) by mouth every 4 hours as needed for mild pain or fever (greater than or equal to 38  C /100.4  F (oral) or 38.5  C/ 101.4  F (core).)     cyanocobalamin (VITAMIN B-12) 500 MCG SUBL sublingual tablet Place 2 tablets (1,000 mcg) under the tongue daily for 14 days     ferrous gluconate (FERGON) 324 (38 Fe) MG tablet Take 1 tablet (324 mg) by mouth daily (with breakfast)     ibuprofen (ADVIL/MOTRIN) 600 MG tablet Take 1 tablet (600 mg) by mouth every 6 hours as needed for other (cramping)     lanolin ointment Apply topically every hour as needed for dry skin (sore nipples)     Prenat w/o V-OX-Sagyvse-FA-DHA (PNV-DHA PO)      senna-docusate (SENOKOT-S/PERICOLACE) 8.6-50 MG tablet Take 1 tablet by mouth 2 times daily as needed for constipation     vitamin C (ASCORBIC ACID) 250 MG TABS tablet Take 1 tablet (250 mg) by mouth daily for 14 days     No current facility-administered medications for this visit.      Allergies   Allergen Reactions     Azithromycin Nausea and Vomiting     In high school   Other reaction(s): GI Upset     Amoxicillin Hives and Rash     In high school        ROS:  12 point review of systems negative other than symptoms noted below.    OBJECTIVE:     /66   Ht 1.727 m (5' 8\")   Wt 63.1 kg (139 lb 3.2 oz)   LMP 10/06/2018 (Approximate)   BMI 21.17 kg/m    Body mass index is 21.17 kg/m .    PHYSICAL EXAM:  Constitutional:  Appearance: Well nourished, well developed alert, in no acute distress   PELVIC EXAM:  Vulva: No external lesions, BUS WNL, sutures still visualized at introitus  Gap below introitus where sutures appear not to have approximated skin, mucosa is well " approximated and appears to be healing well  Rectal exam: rectum appears intact    In-Clinic Test Results:  Results for orders placed or performed in visit on 08/08/19 (from the past 24 hour(s))   UA with Microscopic   Result Value Ref Range    Color Urine Yellow     Appearance Urine Clear     Glucose Urine Negative NEG^Negative mg/dL    Bilirubin Urine Negative NEG^Negative    Ketones Urine Negative NEG^Negative mg/dL    Specific Gravity Urine 1.010 1.003 - 1.035    pH Urine 6.5 5.0 - 7.0 pH    Protein Albumin Urine Negative NEG^Negative mg/dL    Urobilinogen Urine 0.2 0.2 - 1.0 EU/dL    Nitrite Urine Negative NEG^Negative    Blood Urine Trace (A) NEG^Negative    Leukocyte Esterase Urine Trace (A) NEG^Negative    Source Midstream Urine     WBC Urine 5-10 (A) OTO5^0 - 5 /HPF    RBC Urine O - 2 OTO2^O - 2 /HPF    Squamous Epithelial /LPF Urine Few FEW^Few /LPF    Bacteria Urine Few (A) NEG^Negative /HPF   OB hemoglobin   Result Value Ref Range    Hemoglobin 10.6 (L) 11.7 - 15.7 g/dL       ASSESSMENT/PLAN:                                                        ICD-10-CM    1. Postpartum mood disturbance O90.6 MENTAL HEALTH REFERRAL  - Adult; Outpatient Treatment; Individual/Couples/Family/Group Therapy/Health Psychology; Other: [facility] [phone]; We will contact you to schedule the appointment or please call with any questi...   2. Anemia due to blood loss, acute D62 OB hemoglobin   3. Possible urinary tract infection R39.89 UA with Microscopic     Urine Culture Aerobic Bacterial   4. Third degree laceration of perineum during delivery, postpartum O70.20        COUNSELING:  Discussed healing of tearing, skin may still come together as it has only been 13 days since delivery  Reviewed with on call MD Dr. Graf, continue to monitor and if gap appears to still be presents f/u with MD who did repair  Recommend appointment with Dr. Harrison in 1-2 weeks if discomfort worsens or gap is still  noticeable, would likely need a stitch or two to bring area together   Reviewed anxiety history and current feelings of isolation, recommend mental health referral for therapy and also discussed option to join [employer] class, transition to parenting can be difficult and especially when no other friends or support are going through the same thing  Encouraged good self care, recommend finding locations for outings that have breastfeeding lounges/placed she can feel comfortable bringing baby  If therapy and self care do not seem to improve anxious feelings would recommend starting medications  Hemoglobin today, 10.6, okay to discharge iron supplements but continue prenatal  Continue colace PRN for another 1-2 weeks to avoid straining  Call with any questions or concerns    20 minutes was spent face to face with the patient today discussing her history, diagnosis, and follow-up plan as noted above. Over 50% of the visit was spent in counseling and coordination of care.    Total Visit Time: 20 minutes.       MOHSEN Boo, GENM

## 2019-08-09 ASSESSMENT — ANXIETY QUESTIONNAIRES: GAD7 TOTAL SCORE: 3

## 2019-08-10 LAB
BACTERIA SPEC CULT: NORMAL
BACTERIA SPEC CULT: NORMAL
Lab: NORMAL
SPECIMEN SOURCE: NORMAL

## 2019-08-15 ENCOUNTER — TELEPHONE (OUTPATIENT)
Dept: OBGYN | Facility: CLINIC | Age: 24
End: 2019-08-15

## 2019-08-15 NOTE — TELEPHONE ENCOUNTER
Pt was told to make appointment with Dr. Harrison if epis is not coming together. Pt states epis still has a gap. Requesting appointment with Dr. Harrison. Discussed with scheduling and pt has appt at 10:00 am with Dr. Harrison tomorrow    COUNSELING:  Discussed healing of tearing, skin may still come together as it has only been 13 days since delivery  Reviewed with on call MD Dr. Graf, continue to monitor and if gap appears to still be presents f/u with MD who did repair  Recommend appointment with Dr. Harrison in 1-2 weeks if discomfort worsens or gap is still noticeable, would likely need a stitch or two to bring area together   Reviewed anxiety history and current feelings of isolation, recommend mental health referral for therapy and also discussed option to join [employer] class, transition to parenting can be difficult and especially when no other friends or support are going through the same thing  Encouraged good self care, recommend finding locations for outings that have breastfeeding lounges/placed she can feel comfortable bringing baby  If therapy and self care do not seem to improve anxious feelings would recommend starting medications  Hemoglobin today, 10.6, okay to discharge iron supplements but continue prenatal  Continue colace PRN for another 1-2 weeks to avoid straining  Call with any questions or concerns

## 2019-08-16 ENCOUNTER — OFFICE VISIT (OUTPATIENT)
Dept: OBGYN | Facility: CLINIC | Age: 24
End: 2019-08-16
Payer: COMMERCIAL

## 2019-08-16 VITALS
WEIGHT: 138.8 LBS | HEART RATE: 68 BPM | HEIGHT: 68 IN | BODY MASS INDEX: 21.04 KG/M2 | SYSTOLIC BLOOD PRESSURE: 104 MMHG | DIASTOLIC BLOOD PRESSURE: 66 MMHG

## 2019-08-16 PROCEDURE — 99024 POSTOP FOLLOW-UP VISIT: CPT | Performed by: OBSTETRICS & GYNECOLOGY

## 2019-08-16 ASSESSMENT — MIFFLIN-ST. JEOR: SCORE: 1428.09

## 2019-08-16 NOTE — PROGRESS NOTES
SUBJECTIVE:                                                   Melina Palacios is a 23 year old female who presents to clinic today for the following health issue(s):  Patient presents with:  Wound Check: Pt states site isn't fully closed; denies any infection sx    HPI:  Patient is a patient of the midwives but delivered with vacuum by me and then had a very large 3rd degree tear with a very short perineal body  Was in last week to see Sophia d/t anxiety and wanting her to be seen at 2-3 weeks.   At that time she felt her perineum was recovering well, no pain other than expected, no bleeding but when looked with a mirror could see a hole. Sophia checked it and there is a small separation but it didn't look infected or problematic.  Encouraged the patient to schedule an appointment with me today to check it  States her pain is better and better. Can still see a hole when she looks but nothing looks infected or red or draining  On occasion still has some stinging when goes to the bathroom and urine touches her skin but other than that is doing great    Patient's last menstrual period was 10/06/2018 (approximate)..   Patient is not sexually active, .  Using not sexually active for contraception.    reports that she has never smoked. She has never used smokeless tobacco.    STD testing offered?  Declined    Health maintenance updated:  no, will need pap/HPV at post partum visit    Problem list and histories reviewed & adjusted, as indicated.  Additional history: as documented.    Patient Active Problem List   Diagnosis     Supervision of high risk pregnancy in third trimester     Anxiety disorder affecting pregnancy, antepartum     Pregnancy related condition     Vacuum extractor delivery, delivered     Third degree laceration of perineum during delivery, postpartum     Lactating mother     Other immediate postpartum hemorrhage     NY (generalized anxiety disorder)     Irritable bowel syndrome with diarrhea      "Past Surgical History:   Procedure Laterality Date     ENT SURGERY      wisdom teeth extraction      wisdom teeth        Social History     Tobacco Use     Smoking status: Never Smoker     Smokeless tobacco: Never Used   Substance Use Topics     Alcohol use: No     Frequency: Never      Problem (# of Occurrences) Relation (Name,Age of Onset)    Breast Cancer (3) Mother, Maternal Aunt, Paternal Grandmother    Cancer (2) Mother, Maternal Aunt    No Known Problems (5) Father, Sister, Brother, Maternal Grandfather, Other    Thyroid Disease (1) Maternal Grandmother            Current Outpatient Medications   Medication Sig     lanolin ointment Apply topically every hour as needed for dry skin (sore nipples)     Prenat w/o W-CS-Teewfkl-FA-DHA (PNV-DHA PO)      No current facility-administered medications for this visit.      Allergies   Allergen Reactions     Azithromycin Nausea and Vomiting     In high school   Other reaction(s): GI Upset     Amoxicillin Hives and Rash     In high school        ROS:  12 point review of systems negative other than symptoms noted below.    OBJECTIVE:     /66   Pulse 68   Ht 1.727 m (5' 8\")   Wt 63 kg (138 lb 12.8 oz)   LMP 10/06/2018 (Approximate)   BMI 21.10 kg/m    Body mass index is 21.1 kg/m .    Exam:  Constitutional:  Appearance: Well nourished, well developed alert, in no acute distress  Pelvic Exam:  External Genitalia:     Normal appearance for age, no discharge present, no tenderness present, no inflammatory lesions present, color normal  Vagina:     Normal vaginal vault without central or paravaginal defects, no discharge present, no inflammatory lesions present, no masses present  Bladder:     Nontender to palpation  Urethra:   Urethral Body:  Urethra palpation normal, urethra structural support normal   Urethral Meatus:  No erythema or lesions present    Perineum:     Perineum IS HEALING VERY APPROPRIATELY. THERE IS A SMALL <1CM AREA THAT  BUT IT IS VERY " SHALLOW AND SUPERFICIAL. THERE IS A VISIBLE STITCH THERE ALSO. ON HER RIGHT LOWER LABIA AT THE INTROITUS AND UP THE LABIA IS STILL A LINE OF SUTURE AND SOME GRANULATION TISSUE AND A SMALL INDENTATION FROM WHERE THE TISSUE WAS REAPPROXIMATED SLIGHTLY BELOW WHERE IT TORE BUT IS OVERALL HEALING GREAT.  no evidence of trauma, no rashes or skin lesions present  Anus:     Anus within normal limits, no hemorrhoids present  Inguinal Lymph Nodes:     No lymphadenopathy present  Pubic Hair:     Normal pubic hair distribution for age  Genitalia and Groin:     No rashes present, no lesions present, no areas of discoloration, no masses present       In-Clinic Test Results:  No results found for this or any previous visit (from the past 24 hour(s)).    ASSESSMENT/PLAN:                                                        ICD-10-CM    1. Obstetrical laceration, third degree O70.20          Patient's laceration is healing jsut fine. The area that she sees a hole is very superficial and once the stitch absorbs it will heal in just fine by secondary intention and there will very unlikely be any need to do any type of repair at her next delivery  The right labia and introitus are healing well though still granulated and suture present. The cosmetic affect is not ideal in this area but patient had a very large, wide and broad laceration that was very difficult and complicated to repair so cosmesis was likely to not be ideal. Overall though is healing well and should not give her any impacts on sexual function or subsequent pregnancy  Can f/u with CNM in another 2-3 weeks for her routine check up    Hannah Harrison MD  [facility] [place]

## 2019-09-04 NOTE — PROGRESS NOTES
"Midwife Postpartum 6 Week Visit    [place] Palacios is a 23 year old here for a postpartum checkup. Overall doing well, bottoms feels and looks much better since last check up with Dr. Harrison . Would like her urine checked today due to history of UTIs.    Delivery date was 19. She had a  and vaginal delivery with vacuum assist of a viable boy, named Mimi, weight 8 pounds 7 oz., with none complications      Since delivery, she has been breast feeding.  She has not had any signs of infection, her lochia stopped after  3 weeks.  She has not had other complications.      She is voiding and having bowel movements without difficulty.  no     Contraception was discussed and patient desires none.   She  has not had intercourse since delivery.   She complains of No  perineal discomfort.     Mood is Stable  Patient screened for postpartum depression.   Depression Rating was:   Last PHQ-9 score on record =   PHQ-9 SCORE 2019   PHQ-9 Total Score 2     Last GAD7 score on record =   [place]-7 SCORE 2019   Total Score 1     Alcohol Score = 0    ROS:  12 point review of systems negative other than symptoms noted below.       Current Outpatient Medications:      Prenat w/o N-CM-Sdxchvh-FA-DHA (PNV-DHA PO), , Disp: , Rfl:      lanolin ointment, Apply topically every hour as needed for dry skin (sore nipples), Disp: 1 g, Rfl: .   OB History    Para Term  AB Living   1 1 1 0 0 1   SAB TAB Ectopic Multiple Live Births   0 0 0 0 1      # Outcome Date GA Lbr Allan/2nd Weight Sex Delivery Anes PTL Lv   1 Term 19 40w2d 04:20 / 03:53 3.827 kg (8 lb 7 oz) M Vag-Vacuum EPI N ALEA      Name: ELIZABETH,MALE-DALLAS      Apgar1: 8  Apgar5: 9     Last pap:    Lab Results   Component Value Date    PAP Neg 08/15/2016     Hgb in hospital was 7.7  Repeat on  was 10.6    EXAM:  /60   Pulse 68   Ht 1.727 m (5' 8\")   Wt 62.6 kg (138 lb)   LMP 10/06/2018 (Approximate)   BMI 20.98 kg/m    BMI: Body mass " index is 20.98 kg/m .  Constitutional: healthy, alert and no distress  Neck: symmetrical, thyroid normal size, no masses present, no lymphadenopathy present.   Breast:normal without masses, tenderness or nipple discharge and no palpable axillary masses or adenopathy, deferred, patient lactating.  Abdomen: soft, non-tender, no diastasis   PELVIC EXAM:  Vulva: No lesions, well healed, BUS WNL,  Mild tenderness  Vagina: Moist, pink, discharge normal  well rugated, no lesions  Cervix:smooth, pink, no visible lesions  Uterus: Involuted to normal size, non-tender, no masses palpated  Ovaries: No masses palpated  Pelvic tone: weak  Rectal exam: deferred    ASSESSMENT:   Normal postpartum exam after VAVD and 3rd degree laceration.    ICD-10-CM    1. Routine postpartum follow-up Z39.2    2. Screening for cervical cancer Z12.4 Pap imaged thin layer screen only - recommended age 21 - 24 years   3. History of UTI Z87.440 UA with Microscopic     Urine Culture Aerobic Bacterial   4. Situational anxiety F41.8        PLAN:  Return as needed or at time of next expected pap, pelvic, or breast exam.  Teaching: self breast exam, exercise, birth control, mental health and weight/diet  Family Planning:condoms, also discussed POPs, patient to call if she wishes to switch   Encourage Kegels and abdominal exercise.  Continue a multivitamin/prenatal supplement, especially if breastfeeding.  Pap smear was obtained today. Discussed pap guidelines and screening  Postpartum Hgb was not done today. Done at 2 weeks PP  Discussed laceration, now appears well healed, no evidence of separation in healing, no granulation tissue  Review pregnancy spacing, recommend at least 1 year before actively trying due to 3rd degree laceration and difficulty delivery  Return to clinic:  1 year or sooner if problems arise    MOHSEN Boo, CNM

## 2019-09-09 ENCOUNTER — NURSE TRIAGE (OUTPATIENT)
Dept: NURSING | Facility: CLINIC | Age: 24
End: 2019-09-09

## 2019-09-09 ENCOUNTER — TELEPHONE (OUTPATIENT)
Dept: MIDWIFE SERVICES | Facility: CLINIC | Age: 24
End: 2019-09-09

## 2019-09-09 ENCOUNTER — PRENATAL OFFICE VISIT (OUTPATIENT)
Dept: MIDWIFE SERVICES | Facility: CLINIC | Age: 24
End: 2019-09-09
Payer: COMMERCIAL

## 2019-09-09 VITALS
WEIGHT: 138 LBS | BODY MASS INDEX: 20.92 KG/M2 | HEART RATE: 68 BPM | DIASTOLIC BLOOD PRESSURE: 60 MMHG | SYSTOLIC BLOOD PRESSURE: 100 MMHG | HEIGHT: 68 IN

## 2019-09-09 DIAGNOSIS — Z87.440 HISTORY OF UTI: ICD-10-CM

## 2019-09-09 DIAGNOSIS — F41.8 SITUATIONAL ANXIETY: ICD-10-CM

## 2019-09-09 DIAGNOSIS — Z12.4 SCREENING FOR CERVICAL CANCER: ICD-10-CM

## 2019-09-09 PROBLEM — F41.9 ANXIETY DISORDER AFFECTING PREGNANCY, ANTEPARTUM: Chronic | Status: RESOLVED | Noted: 2019-03-05 | Resolved: 2019-09-09

## 2019-09-09 PROBLEM — O26.90 PREGNANCY RELATED CONDITION: Status: RESOLVED | Noted: 2019-07-26 | Resolved: 2019-09-09

## 2019-09-09 PROBLEM — O09.93 SUPERVISION OF HIGH RISK PREGNANCY IN THIRD TRIMESTER: Status: RESOLVED | Noted: 2019-03-05 | Resolved: 2019-09-09

## 2019-09-09 PROBLEM — O99.340 ANXIETY DISORDER AFFECTING PREGNANCY, ANTEPARTUM: Chronic | Status: RESOLVED | Noted: 2019-03-05 | Resolved: 2019-09-09

## 2019-09-09 LAB
ALBUMIN UR-MCNC: ABNORMAL MG/DL
APPEARANCE UR: CLEAR
BILIRUB UR QL STRIP: NEGATIVE
COLOR UR AUTO: YELLOW
GLUCOSE UR STRIP-MCNC: NEGATIVE MG/DL
HGB UR QL STRIP: ABNORMAL
KETONES UR STRIP-MCNC: NEGATIVE MG/DL
LEUKOCYTE ESTERASE UR QL STRIP: ABNORMAL
NITRATE UR QL: NEGATIVE
NON-SQ EPI CELLS #/AREA URNS LPF: ABNORMAL /LPF
PH UR STRIP: 6 PH (ref 5–7)
RBC #/AREA URNS AUTO: ABNORMAL /HPF
SOURCE: ABNORMAL
SP GR UR STRIP: >1.03 (ref 1–1.03)
UROBILINOGEN UR STRIP-ACNC: 0.2 EU/DL (ref 0.2–1)
WBC #/AREA URNS AUTO: ABNORMAL /HPF

## 2019-09-09 PROCEDURE — 99207 ZZC PRENATAL VISIT: CPT | Performed by: ADVANCED PRACTICE MIDWIFE

## 2019-09-09 PROCEDURE — 87086 URINE CULTURE/COLONY COUNT: CPT | Performed by: ADVANCED PRACTICE MIDWIFE

## 2019-09-09 PROCEDURE — G0145 SCR C/V CYTO,THINLAYER,RESCR: HCPCS | Performed by: ADVANCED PRACTICE MIDWIFE

## 2019-09-09 PROCEDURE — 81001 URINALYSIS AUTO W/SCOPE: CPT | Performed by: ADVANCED PRACTICE MIDWIFE

## 2019-09-09 SDOH — HEALTH STABILITY: MENTAL HEALTH: HOW OFTEN DO YOU HAVE 6 OR MORE DRINKS ON ONE OCCASION?: NEVER

## 2019-09-09 ASSESSMENT — ANXIETY QUESTIONNAIRES
5. BEING SO RESTLESS THAT IT IS HARD TO SIT STILL: NOT AT ALL
1. FEELING NERVOUS, ANXIOUS, OR ON EDGE: SEVERAL DAYS
3. WORRYING TOO MUCH ABOUT DIFFERENT THINGS: NOT AT ALL
6. BECOMING EASILY ANNOYED OR IRRITABLE: NOT AT ALL
GAD7 TOTAL SCORE: 1
2. NOT BEING ABLE TO STOP OR CONTROL WORRYING: NOT AT ALL
IF YOU CHECKED OFF ANY PROBLEMS ON THIS QUESTIONNAIRE, HOW DIFFICULT HAVE THESE PROBLEMS MADE IT FOR YOU TO DO YOUR WORK, TAKE CARE OF THINGS AT HOME, OR GET ALONG WITH OTHER PEOPLE: NOT DIFFICULT AT ALL
7. FEELING AFRAID AS IF SOMETHING AWFUL MIGHT HAPPEN: NOT AT ALL

## 2019-09-09 ASSESSMENT — MIFFLIN-ST. JEOR: SCORE: 1424.46

## 2019-09-09 ASSESSMENT — PATIENT HEALTH QUESTIONNAIRE - PHQ9
5. POOR APPETITE OR OVEREATING: NOT AT ALL
SUM OF ALL RESPONSES TO PHQ QUESTIONS 1-9: 2

## 2019-09-09 NOTE — LETTER
September 12, 2019      Melina Palacios  [address]  [place] [address]    Dear ,      I am happy to inform you that your recent cervical cancer screening test (PAP smear) was normal.      Preventative screenings such as this help to ensure your health for years to come. You should repeat a pap smear in 3 years, unless otherwise directed.      You will still need to return to the clinic every year for your annual exam and other preventive tests.     If you have additional questions regarding this result, please call our registered nurse, Gabriela at [phone].      Sincerely,      MOHSEN Ovalle CNM/gilbert

## 2019-09-10 ENCOUNTER — OFFICE VISIT (OUTPATIENT)
Dept: MIDWIFE SERVICES | Facility: CLINIC | Age: 24
End: 2019-09-10
Payer: COMMERCIAL

## 2019-09-10 VITALS
SYSTOLIC BLOOD PRESSURE: 90 MMHG | TEMPERATURE: 97.5 F | BODY MASS INDEX: 20.76 KG/M2 | HEIGHT: 68 IN | WEIGHT: 137 LBS | HEART RATE: 52 BPM | DIASTOLIC BLOOD PRESSURE: 60 MMHG

## 2019-09-10 DIAGNOSIS — N61.0 MASTITIS: Primary | ICD-10-CM

## 2019-09-10 LAB
BACTERIA SPEC CULT: NORMAL
SPECIMEN SOURCE: NORMAL

## 2019-09-10 PROCEDURE — 99213 OFFICE O/P EST LOW 20 MIN: CPT | Performed by: NURSE PRACTITIONER

## 2019-09-10 RX ORDER — CLINDAMYCIN HCL 300 MG
300 CAPSULE ORAL 3 TIMES DAILY
Qty: 21 CAPSULE | Refills: 0 | Status: SHIPPED | OUTPATIENT
Start: 2019-09-10 | End: 2019-10-11

## 2019-09-10 ASSESSMENT — ANXIETY QUESTIONNAIRES: GAD7 TOTAL SCORE: 1

## 2019-09-10 ASSESSMENT — MIFFLIN-ST. JEOR: SCORE: 1419.93

## 2019-09-10 NOTE — PROGRESS NOTES
"Melina Palacios is a 23 year old who had a Vaginal on July / 26 / 2019.    HPI:  Patient is a breast feeding mother.  She is in with the chief complaint of soreness and redness in her right breast of 1 days duration.  She states that breast redness/soreness started yesterday afternoon  Pain: throbing and tender; 7/10  Fever/Chills:YES, max temp 101 at home yesterday  She is otherwise asymptomatic.    Health maintenance updated:  yes  Patient Active Problem List   Diagnosis     Lactating mother     Situational anxiety     Irritable bowel syndrome with diarrhea     History of UTI     Past Medical History:   Diagnosis Date     Depressive disorder      NY (generalized anxiety disorder)      IBS (irritable bowel syndrome)      Other immediate postpartum hemorrhage 7/26/2019     Ovarian cyst      Third degree laceration of perineum during delivery, postpartum 7/26/2019     Vacuum extractor delivery, delivered 7/26/2019     Past Surgical History:   Procedure Laterality Date     ENT SURGERY      wisdom teeth extraction      wisdom teeth       Current Outpatient Medications   Medication Sig Dispense Refill     clindamycin (CLEOCIN) 300 MG capsule Take 1 capsule (300 mg) by mouth 3 times daily for 7 days 21 capsule 0     lanolin ointment Apply topically every hour as needed for dry skin (sore nipples) 1 g      Prenat w/o C-QK-Esjpbef-FA-DHA (PNV-DHA PO)        Allergies   Allergen Reactions     Azithromycin Nausea and Vomiting     In high school   Other reaction(s): GI Upset     Amoxicillin Hives and Rash     In high school        ROS:  See HPI    PHYSICAL EXAM:   BP 90/60 (BP Location: Right arm, Patient Position: Sitting, Cuff Size: Adult Regular)   Pulse 52   Temp 97.5  F (36.4  C) (Oral)   Ht 1.727 m (5' 8\")   Wt 62.1 kg (137 lb)   Breastfeeding? Yes   BMI 20.83 kg/m    Her temperature is 97.5.    Breasts:  left breast is totally negative.  Examination of right breast reveals the presence of erythema over the " upper inner quadrant portion of the breast along with tenderness. Breast mass: no.  Nipples are intact.      IMPRESSION/PLAN     ICD-10-CM    1. Mastitis N61.0 clindamycin (CLEOCIN) 300 MG capsule   2. Lactating mother Z39.1          She was advised to force fluids and take OTC pain meds such as ibuprofen or tylenol PRN for pain and discomfort.    Warm packs/warm showers prior to breastfeeding or pumping to encourage better flow and let down    Do frequent, effective milk removal through continuing to breastfeed and/or pumping.      Cold packs/ice packs to reduce inflammation and imrove pain.    Referral to a lactation consultant if desired or if symptoms persist    Clindamycin 300mg 3xday x 7 days d/t cillin allergy    Return to clinic/call if symptoms persist beyond 1-2 days after treatment.    Madeline CONNOLLY, SANDEEP

## 2019-09-10 NOTE — TELEPHONE ENCOUNTER
"Call back to Melina after page by FNA. Melina states she was just seen this morning for her 6 week PP check and everything was fine. After she got home she felt tired, took a nap and when she woke up states her R breast felt \"super sore.\"  States breast does not look any different, but pain is sharp and sore throughout.  Initially, did not have a fever, but now has a fever of 101.8. Has not tried any Tylenol or Ibuprofen. Baby is due to eat now and she does not know how she is going to feed him.     Advised loading dose of Tylenol (1000 mg) and Ibuprofen (600 mg) and then alternating the two throughout the night to help with fever and pain until she can be seen in the clinic tomorrow. Encouraged warm and cold compresses, rest and hydration. Advised to try breastfeeding on R side, but if unable, use the breast pump to empty breast and not leave breast full as this will exacerbate symptoms.     Melina seems hesitant to wait and go to clinic tomorrow. States she feels dizzy and not sure how she will care for her baby. Suggested  helping and possibly taking off work to help her.    Suggested for her to go to ER now if she does not feel comfortable waiting until tomorrow.    Advised going to ER, though if fever can not be managed with Tylenol and Ibuprofen.     At this time Melina will plan to make a clinic appt in the am for evaluation of suspected mastitis.     Hanna CONNOLLY CNM    "

## 2019-09-10 NOTE — TELEPHONE ENCOUNTER
"Pt calls in with complaint of Right breast tenderness - red - painful to touch - rates pain 6-7/10    Also states now has fever > 101.8 po at 7:50 pm     Pt was JUST in today for routine f/u visit with mid-wife  Per pt \"everything was fine\"    Per protocol pt advised to be seen within 4 hours     [facility] p[facility] [place]   Will have Provider Benjamín call pt back directly for 2nd triage at [phone]    Protocol and care advice reviewed  Caller states understanding of the recommended disposition  Advised to call back if further questions or concerns    Gokul Short RN / [facility]        Reason for Disposition    [1Breast looks infected (spreading redness, feels hot or painful to touch) AND [2] fever    Additional Information    Negative: [1] SEVERE breast pain AND [2] fever > 103 F (39.4 C)    Negative: Patient sounds very sick or weak to the triager    Protocols used: BREAST SYMPTOMS-A-AH      "

## 2019-09-11 LAB
COPATH REPORT: NORMAL
PAP: NORMAL

## 2019-09-26 ENCOUNTER — TELEPHONE (OUTPATIENT)
Dept: OBGYN | Facility: CLINIC | Age: 24
End: 2019-09-26

## 2019-10-03 ENCOUNTER — DOCUMENTATION ONLY (OUTPATIENT)
Dept: OBGYN | Facility: CLINIC | Age: 24
End: 2019-10-03

## 2019-10-03 NOTE — PROGRESS NOTES
[facility] will be sharing updates with you on Maternal Child Health Referral requests for home care services.  This is for care coordination purposes and alert you to referral status.  We received the referral for  Melina Palacios; MRN [id number] and want to update you:      [facility] has made two attempts to contact patient by phone and text message over the last four days.  We have not had any response from patient.  Final message was left advising patient to follow up with Primary Care Providers for mom and baby.  Ordering MD and Primary Care Providers for mom and baby notified.    Sincerely [facility]  Froylan Huynh  [phone]

## 2019-10-10 NOTE — PROGRESS NOTES
SUBJECTIVE:                                                   Melina Palacios is a 23 year old who presents to clinic today for the following health issue(s):  Patient presents with:  Breast Problem: c/o possible clogged duct in right breast      HPI:  Melina has had a right clogged breast duct for about 3-4 weeks. Was treated for mastitis about a month ago and noticed it shortly after that. The lump will go away with interventions such as warm pack, warm showers, feeding, and massagebut comes back. Is worried about getting mastitis again so wanted to get checked out. Feels like tenderness, would rate pain 1/10, denies signs and symptoms of mastitis. Had flu like last time and is overall feeling well.    No LMP recorded. (Menstrual status: Breast Feeding).  Menstrual History: amenorrhea breast feeding  Patient is not sexually active  .  Using not sexually active for contraception.   Health maintenance updated:  yes  STI infx testing offered:  Declined    Problem list and histories reviewed & adjusted, as indicated.  Additional history: as documented.    Patient Active Problem List   Diagnosis     Lactating mother     Situational anxiety     Irritable bowel syndrome with diarrhea     History of UTI     Past Surgical History:   Procedure Laterality Date     ENT SURGERY      wisdom teeth extraction      wisdom teeth        Social History     Tobacco Use     Smoking status: Never Smoker     Smokeless tobacco: Never Used   Substance Use Topics     Alcohol use: No     Frequency: Never     Binge frequency: Never      Problem (# of Occurrences) Relation (Name,Age of Onset)    Breast Cancer (3) Mother, Maternal Aunt, Paternal Grandmother    Cancer (2) Mother, Maternal Aunt    No Known Problems (5) Father, Sister, Brother, Maternal Grandfather, Other    Thyroid Disease (1) Maternal Grandmother            Current Outpatient Medications   Medication Sig     Prenat w/o A-BI-[id number]-FA-DHA (PNV-DHA PO)      No current  "facility-administered medications for this visit.      Allergies   Allergen Reactions     Azithromycin Nausea and Vomiting     In high school   Other reaction(s): GI Upset  Other reaction(s): Gastrointestinal     Amoxicillin Hives and Rash     In high school        ROS:  12 point review of systems negative other than symptoms noted below.  Breast: Lumps    OBJECTIVE:     /56   Pulse 68   Ht 1.727 m (5' 8\")   Wt 62.6 kg (138 lb)   BMI 20.98 kg/m    Body mass index is 20.98 kg/m .    PHYSICAL EXAM:  Constitutional:  Appearance: Well nourished, well developed alert, in no acute distress  Breasts:  Inspection of Breasts:  Symmetric bilaterally.  No puckering.  No skin changes.  Palpation of Breasts and Axillae:  No masses present on palpation, no breast tenderness Axillary Lymph Nodes:  No lymphadenopathy present   Area of dense tissue a 9 o'clock and between 9-11 o'clock on the upper outer quadrant, no erythema, no defined margins    In-Clinic Test Results:  No results found for this or any previous visit (from the past 24 hour(s)).    ASSESSMENT/PLAN:                                                        ICD-10-CM    1. Clogged duct, postpartum O92.79 US Breast Right Complete 4 Quadrants     BREAST CENTER REFERRAL       COUNSELING:  Breast center referral for right breast US  Discussed we want to r/o abscess versus some women have recurrent clogged ducts and she may continue to deal with that area as long as she is breastfeeeding  Discussed comfort measures, may use heat/ibuprofen, continue breastfeeding  Continue interventions to prevent developlement of infection      MOHSEN Boo, CNM      "

## 2019-10-11 ENCOUNTER — OFFICE VISIT (OUTPATIENT)
Dept: MIDWIFE SERVICES | Facility: CLINIC | Age: 24
End: 2019-10-11
Payer: COMMERCIAL

## 2019-10-11 VITALS
DIASTOLIC BLOOD PRESSURE: 56 MMHG | BODY MASS INDEX: 20.92 KG/M2 | WEIGHT: 138 LBS | HEIGHT: 68 IN | HEART RATE: 68 BPM | SYSTOLIC BLOOD PRESSURE: 100 MMHG

## 2019-10-11 DIAGNOSIS — O92.79 CLOGGED DUCT, POSTPARTUM: Primary | ICD-10-CM

## 2019-10-11 PROCEDURE — 99214 OFFICE O/P EST MOD 30 MIN: CPT | Performed by: ADVANCED PRACTICE MIDWIFE

## 2019-10-11 ASSESSMENT — MIFFLIN-ST. JEOR: SCORE: 1424.46

## 2019-10-17 ENCOUNTER — HOSPITAL ENCOUNTER (OUTPATIENT)
Dept: MAMMOGRAPHY | Facility: CLINIC | Age: 24
Discharge: HOME | End: 2019-10-17
Attending: ADVANCED PRACTICE MIDWIFE | Admitting: ADVANCED PRACTICE MIDWIFE
Payer: COMMERCIAL

## 2019-10-17 DIAGNOSIS — O92.79 CLOGGED DUCT, POSTPARTUM: ICD-10-CM

## 2019-10-17 PROCEDURE — 76642 ULTRASOUND BREAST LIMITED: CPT | Mod: RT

## 2020-04-06 ENCOUNTER — VIRTUAL VISIT (OUTPATIENT)
Dept: MIDWIFE SERVICES | Facility: CLINIC | Age: 25
End: 2020-04-06
Payer: COMMERCIAL

## 2020-04-06 DIAGNOSIS — G47.00 INSOMNIA, UNSPECIFIED TYPE: ICD-10-CM

## 2020-04-06 DIAGNOSIS — R00.2 PALPITATIONS: ICD-10-CM

## 2020-04-06 DIAGNOSIS — N91.2 AMENORRHEA: ICD-10-CM

## 2020-04-06 DIAGNOSIS — F41.9 ANXIETY: Primary | ICD-10-CM

## 2020-04-06 PROCEDURE — 99214 OFFICE O/P EST MOD 30 MIN: CPT | Mod: TEL | Performed by: NURSE PRACTITIONER

## 2020-04-06 ASSESSMENT — PATIENT HEALTH QUESTIONNAIRE - PHQ9
5. POOR APPETITE OR OVEREATING: MORE THAN HALF THE DAYS
SUM OF ALL RESPONSES TO PHQ QUESTIONS 1-9: 12

## 2020-04-06 ASSESSMENT — ANXIETY QUESTIONNAIRES
GAD7 TOTAL SCORE: 13
3. WORRYING TOO MUCH ABOUT DIFFERENT THINGS: SEVERAL DAYS
IF YOU CHECKED OFF ANY PROBLEMS ON THIS QUESTIONNAIRE, HOW DIFFICULT HAVE THESE PROBLEMS MADE IT FOR YOU TO DO YOUR WORK, TAKE CARE OF THINGS AT HOME, OR GET ALONG WITH OTHER PEOPLE: VERY DIFFICULT
6. BECOMING EASILY ANNOYED OR IRRITABLE: MORE THAN HALF THE DAYS
7. FEELING AFRAID AS IF SOMETHING AWFUL MIGHT HAPPEN: NEARLY EVERY DAY
1. FEELING NERVOUS, ANXIOUS, OR ON EDGE: MORE THAN HALF THE DAYS
5. BEING SO RESTLESS THAT IT IS HARD TO SIT STILL: MORE THAN HALF THE DAYS
2. NOT BEING ABLE TO STOP OR CONTROL WORRYING: SEVERAL DAYS

## 2020-04-06 NOTE — PROGRESS NOTES
"Subjective     eMlina Palacios is a 24 year old female who is being evaluated via a billable telephone visit.      The patient has been notified of following:     \"This telephone visit will be conducted via a call between you and your physician/provider. We have found that certain health care needs can be provided without the need for a physical exam.  This service lets us provide the care you need with a short phone conversation.  If a prescription is necessary we can send it directly to your pharmacy.  If lab work is needed we can place an order for that and you can then stop by our lab to have the test done at a later time.    If during the course of the call the physician/provider feels a telephone visit is not appropriate, you will not be charged for this service.\"     Patient has given verbal consent for Telephone visit?  Yes    Melina Paalcios complains of   Chief Complaint   Patient presents with     Insomnia     Missing period       ALLERGIES  Azithromycin and Amoxicillin    Abnormal Mood Symptoms      Duration: approx 1month    Description:  Depression: no   Anxiety: YES  Panic attacks: no      Accompanying signs and symptoms: see PHQ-9 and [place] scores    History (similar episodes/previous evaluation): has had anxiety in the past, but states that this feels more intense    Precipitating or alleviating factors: stopped breast feeding 8 weeks ago    Therapies tried and outcome: none      Reviewed and updated as needed this visit by Provider         Review of Systems   ROS COMP: Constitutional, HEENT, cardiovascular, pulmonary, gi and gu systems are negative, except as otherwise noted.       Objective   Reported vitals:  There were no vitals taken for this visit.   healthy, alert and no distress  Psych: Alert and oriented times 3; coherent speech, normal   rate and volume, able to articulate logical thoughts, able   to abstract reason, no tangential thoughts, no hallucinations   or delusions  She states " that she has been having insomnia (only sleeping 4-5 hr/night), feeling like something bad is going to happen 4/7 days, increased HR and feeling anxious    none         Assessment/Plan:  1. Anxiety  - TSH with free T4 reflex; Future  - Vitamin D Deficiency; Future  - Thyroid peroxidase antibody; Future  - HCG quantitative pregnancy; Future  - MENTAL HEALTH REFERRAL  - Adult; Outpatient Treatment; Individual/Couples/Family/Group Therapy/Health Psychology; [facility] [place]: [facility] [phone]; We will contact you to schedule the appointment or please call with any questions  - vitamin B complex with vitamin C (STRESS TAB) tablet; Take 1 tablet by mouth daily  Dispense:      2. Palpitations  - TSH with free T4 reflex; Future  - Vitamin D Deficiency; Future  - Thyroid peroxidase antibody; Future  - HCG quantitative pregnancy; Future    3. Amenorrhea  - TSH with free T4 reflex; Future  - Vitamin D Deficiency; Future  - Thyroid peroxidase antibody; Future  - HCG quantitative pregnancy; Future    4. Insomnia, unspecified type  - MENTAL HEALTH REFERRAL  - Adult; Outpatient Treatment; Individual/Couples/Family/Group Therapy/Health Psychology; [facility] [place] [phone]; We will contact you to schedule the appointment or please call with any questions  - doxylamine (UNISOM) 25 MG TABS tablet; Take 1 tablet (25 mg) by mouth At Bedtime  Dispense:    -Labs ordered to r/o thyroid issue, counseled on OTC remedies for insomnia and anxiety.    -Mental Health referral per patient request for counseling  -continue to monitor mood  -counseled that menses can be irregular after stopping BF 3-6 months on average.    -will consider referral to endocrine if thyroid labs abnormal  -will follow up 2-6 weeks if signs and symptoms not improved or worsen  No follow-ups on file.      Phone call duration:  15 minutes    MOHSEN Cortes CNM

## 2020-04-06 NOTE — PATIENT INSTRUCTIONS
Patient Education     Understanding Anxiety Disorders  Almost everyone gets nervous now and then. It s normal to have knots in your stomach before a test, or for your heart to race on a first date. But an anxiety disorder is much more than a case of nerves. In fact, its symptoms may be overwhelming. But treatment can relieve many of these symptoms. Talking to your healthcare provider is the first step.    What are anxiety disorders?  An anxiety disorder causes intense feelings of panic and fear. These feelings may arise for no apparent reason. And they tend to recur again and again. They may prevent you from coping with life and cause you great distress. As a result, you may avoid anything that triggers your fear. In extreme cases, you may never leave the house. Anxiety disorders may cause other symptoms, such as:    Obsessive thoughts that are unwanted and you can t control    Constant nightmares or painful thoughts of the past    Nausea, sweating, and muscle tension    Trouble sleeping or concentrating  What causes anxiety disorders?  Anxiety disorders tend to run in families. For some people, childhood abuse or neglect may play a role. For others, stressful life events or trauma may trigger anxiety disorders. Anxiety can trigger low self-esteem and poor coping skills.    Panic disorder. This causes an intense fear of being in danger.    Phobias. These are extreme fears of certain objects, places, or events.    Obsessive-compulsive disorder. This causes you to have unwanted thoughts and urges. You also may perform certain actions over and over.    Posttraumatic stress disorder. This occurs in people who have survived a terrible ordeal. It can cause nightmares and flashbacks about the event.    Generalized anxiety disorder. This causes constant worry that can greatly disrupt your life.    Getting better  You may believe that nothing can help you. Or, you might fear what others may think. But most anxiety symptoms  can be eased. Having an anxiety disorder is nothing to be ashamed of. Most people do best with treatment that combines medicine and individual and group therapy. These aren t cures. But they can help you live a healthier life.  Date Last Reviewed: 2/1/2017 2000-2019 [employer]. [address] [place] [address]. All rights reserved. This information is not intended as a substitute for professional medical care. Always follow your healthcare professional's instructions.           Patient Education     Insomnia  Insomnia is repeated difficulty going to sleep or staying asleep, or both. Whether you have insomnia is not defined by a specific amount of sleep. Different people need different amounts of sleep, and you may need more or less sleep at different times of your life.  There are 3 major types of insomnia: short-term, chronic, and  other.  Short-term, or acute insomnia lasts less than 3 months. The symptoms are temporary and can be linked directly to a stressor, such as the death of a loved one, financial problems, or a new physical problem. Short-term insomnia stops when the stressor resolves or the person adapts to its presence.  Chronic insomnia occurs at least 3 times a week and lasts longer than 3 months. Chronic insomnia can occur when either the cause of the sleeping problem is not clear, or the insomnia does not get better when the stressor is resolved. A number of other criteria are also used to make the diagnosis of chronic insomnia.    Other insomnia  is the third type of insomnia-related sleep disorders. This description applies to people who have problems getting to sleep or staying asleep, but don't meet all of the factors that describe either short-term or chronic insomnia.    Many things cause insomnia. Different people may have different causes. It can be from an underlying medical or psychological condition, or lifestyle. It can also be primary insomnia, which means no  cause can be found.  Causes of insomnia include:    Chronic medical problems- heart disease, gastrointestinal problems, hormonal changes, breathing problems    Anxiety    Stress    Depression    Pain    Work schedule    Sleep apnea    Illegal drugs    Certain medicines  Many different medicines can affect your sleep, such as stimulants, caffeine, alcohol, some decongestants, and diet pills. Other medicines may include some types of blood pressure pills, steroids, asthma medicines, antihistamines, antidepressants, seizure medicines and statins. Not all of these will affect your sleep, and they shouldn t be stopped without talking to your doctor.  Symptoms of insomnia can include:    Lying awake for long periods at night before falling asleep    Waking up several times during the night    Waking up early in the morning and not being able to get back to sleep    Feeling tired and not refreshed by sleep    Not being able to function properly during the day and finding it hard to concentrate    Irritability    Tiredness and fatigue during the day  Home care    Review your medicines with your doctor or pharmacist to find out if they can cause insomnia. Not all medicines will affect your sleep, but they shouldn't be stopped without reviewing them with your doctor. There may be serious side effects and consequences from suddenly stopping your medicines. Not taking them may cause strokes, heart attacks, and many other problems.    Caffeine, smoking, and alcohol also affect sleep. Limit your daily use and don't use these before bedtime. Alcohol may make you sleepy at first, but as its effects wear off, you may awaken a few hours later and have trouble returning to sleep.    Don't exercise, eat or drink large amounts of liquid within 2 hours of your bedtime.    Improve your sleep habits. Have a fixed bed and wake-up time. Try to keep noise, light and heat in your bedroom at a comfortable level. Try using earplugs or eyeshades  if needed.     Don't watch TV in bed.    If you don't fall asleep within 30 minutes, try to relax by reading or listening to soft music.    Limit daytime napping to one 30 minute period, early in the day.    Get regular exercise. Find other ways to lessen your stress level.    If a medicine was prescribed to help reset your sleep patterns, take it as directed. Sleeping pills are intended for short-term use, only. If taken for too long, the effect wears off while the risk of physical addiction and psychological dependence increases.  Sleep diary  If the cause isn t obvious and it is not improving, try keeping a  sleep diary  for a couple of weeks. Include in it:    The time you go to bed    How long it takes to fall asleep    How many times you wake up    What time you wake up    Your meal times and what you eat    What time you drink alcohol and caffeine    Your exercise habits and times  Follow-up care  Follow up with your healthcare provider, or as advised. If an X-ray or CT scan was done, you will be notified if there is a change in the reading, especially if it affects treatment.  Call 911  Call 911 if any of these occur:    Trouble breathing    Confusion or trouble waking    Fainting or loss of consciousness    Rapid heart rate    New chest, arm, shoulder, neck or upper back pain    Trouble with speech or vision, weakness of an arm or leg    Trouble walking or talking, loss of balance, numbness or weakness in one side of your body, facial droop  When to seek medical advice  Call your healthcare provider right away if any of these occur:    Extreme restlessness or irritability    Confusion or hallucinations (seeing or hearing things that are not there)    Anxiety, depression    Several days without sleeping  Date Last Reviewed: 5/1/2018 2000-2019 [employer]. [address] [place] [address], [place] [address]. All rights reserved. This information is not intended as a substitute for professional medical  care. Always follow your healthcare professional's instructions.           Patient Education     Understanding the Normal Menstrual Cycle    Having a period (menstruation) is a normal, healthy part of being a woman. It s also part of the menstrual cycle, a process that makes it possible for women to become pregnant. The first day of your period is the first day of your menstrual cycle.   A woman who has irregular cycles can become pregnant during bleeding. This may not be a true menstrual period.   An egg is released  Eggs are female reproductive cells stored in the ovaries. During each cycle, a woman's hormones trigger an egg, (usually 1), to mature and be released from an ovary. This is called ovulation. The egg then travels from the ovary to a fallopian tube.  The egg travels through a tube  The egg moves through the fallopian tube toward the uterus. If sperm are present in the tube, the egg may be fertilized, and pregnancy could result.  The uterine lining grows thicker  The lining of the uterus is made up of blood, tissue, and fluid. During each cycle, hormones cause the lining to thicken. This helps prepare the uterus to receive and nourish a fertilized egg.   The egg and lining are shed  If pregnancy doesn t happen, the egg and thickened lining of the uterus are no longer needed. They are then shed through the vagina. This is called a menstrual period.  How long is each cycle?  It is normal for a cycle to take 21 to 34 days. For teenagers, the time between periods might be as much as 45 days. For adults, it will be around a month from the first day of one period to the first day of the next. That s why you may hear women talk about a  monthly cycle,  even though cycle length can vary from one month to another, and anywhere from 3 to 5 weeks is normal. Not everyone has a 28-day cycle.    How long does a period last?  It s normal for a period to last 2 to 7 days. Talk to your healthcare provider if your period  lasts longer than 7 days for 2 cycles in a row.  Date Last Reviewed: 12/1/2016 2000-2019 [employer]. [address] [facility], [place] [address]. All rights reserved. This information is not intended as a substitute for professional medical care. Always follow your healthcare professional's instructions.           Patient Education     Understanding Anxiety Disorders  Almost everyone gets nervous now and then. It s normal to have knots in your stomach before a test, or for your heart to race on a first date. But an anxiety disorder is much more than a case of nerves. In fact, its symptoms may be overwhelming. But treatment can relieve many of these symptoms. Talking to your healthcare provider is the first step.    What are anxiety disorders?  An anxiety disorder causes intense feelings of panic and fear. These feelings may arise for no apparent reason. And they tend to recur again and again. They may prevent you from coping with life and cause you great distress. As a result, you may avoid anything that triggers your fear. In extreme cases, you may never leave the house. Anxiety disorders may cause other symptoms, such as:    Obsessive thoughts that are unwanted and you can t control    Constant nightmares or painful thoughts of the past    Nausea, sweating, and muscle tension    Trouble sleeping or concentrating  What causes anxiety disorders?  Anxiety disorders tend to run in families. For some people, childhood abuse or neglect may play a role. For others, stressful life events or trauma may trigger anxiety disorders. Anxiety can trigger low self-esteem and poor coping skills.    Panic disorder. This causes an intense fear of being in danger.    Phobias. These are extreme fears of certain objects, places, or events.    Obsessive-compulsive disorder. This causes you to have unwanted thoughts and urges. You also may perform certain actions over and over.    Posttraumatic stress disorder. This  occurs in people who have survived a terrible ordeal. It can cause nightmares and flashbacks about the event.    Generalized anxiety disorder. This causes constant worry that can greatly disrupt your life.    Getting better  You may believe that nothing can help you. Or, you might fear what others may think. But most anxiety symptoms can be eased. Having an anxiety disorder is nothing to be ashamed of. Most people do best with treatment that combines medicine and individual and group therapy. These aren t cures. But they can help you live a healthier life.  Date Last Reviewed: 2/1/2017 2000-2019 [employer]. [address] [place] [address], [place] [address]. All rights reserved. This information is not intended as a substitute for professional medical care. Always follow your healthcare professional's instructions.           Patient Education     Insomnia  Insomnia is repeated difficulty going to sleep or staying asleep, or both. Whether you have insomnia is not defined by a specific amount of sleep. Different people need different amounts of sleep, and you may need more or less sleep at different times of your life.  There are 3 major types of insomnia: short-term, chronic, and  other.  Short-term, or acute insomnia lasts less than 3 months. The symptoms are temporary and can be linked directly to a stressor, such as the death of a loved one, financial problems, or a new physical problem. Short-term insomnia stops when the stressor resolves or the person adapts to its presence.  Chronic insomnia occurs at least 3 times a week and lasts longer than 3 months. Chronic insomnia can occur when either the cause of the sleeping problem is not clear, or the insomnia does not get better when the stressor is resolved. A number of other criteria are also used to make the diagnosis of chronic insomnia.    Other insomnia  is the third type of insomnia-related sleep disorders. This description applies to people who have  problems getting to sleep or staying asleep, but don't meet all of the factors that describe either short-term or chronic insomnia.    Many things cause insomnia. Different people may have different causes. It can be from an underlying medical or psychological condition, or lifestyle. It can also be primary insomnia, which means no cause can be found.  Causes of insomnia include:    Chronic medical problems- heart disease, gastrointestinal problems, hormonal changes, breathing problems    Anxiety    Stress    Depression    Pain    Work schedule    Sleep apnea    Illegal drugs    Certain medicines  Many different medicines can affect your sleep, such as stimulants, caffeine, alcohol, some decongestants, and diet pills. Other medicines may include some types of blood pressure pills, steroids, asthma medicines, antihistamines, antidepressants, seizure medicines and statins. Not all of these will affect your sleep, and they shouldn t be stopped without talking to your doctor.  Symptoms of insomnia can include:    Lying awake for long periods at night before falling asleep    Waking up several times during the night    Waking up early in the morning and not being able to get back to sleep    Feeling tired and not refreshed by sleep    Not being able to function properly during the day and finding it hard to concentrate    Irritability    Tiredness and fatigue during the day  Home care    Review your medicines with your doctor or pharmacist to find out if they can cause insomnia. Not all medicines will affect your sleep, but they shouldn't be stopped without reviewing them with your doctor. There may be serious side effects and consequences from suddenly stopping your medicines. Not taking them may cause strokes, heart attacks, and many other problems.    Caffeine, smoking, and alcohol also affect sleep. Limit your daily use and don't use these before bedtime. Alcohol may make you sleepy at first, but as its effects wear  off, you may awaken a few hours later and have trouble returning to sleep.    Don't exercise, eat or drink large amounts of liquid within 2 hours of your bedtime.    Improve your sleep habits. Have a fixed bed and wake-up time. Try to keep noise, light and heat in your bedroom at a comfortable level. Try using earplugs or eyeshades if needed.     Don't watch TV in bed.    If you don't fall asleep within 30 minutes, try to relax by reading or listening to soft music.    Limit daytime napping to one 30 minute period, early in the day.    Get regular exercise. Find other ways to lessen your stress level.    If a medicine was prescribed to help reset your sleep patterns, take it as directed. Sleeping pills are intended for short-term use, only. If taken for too long, the effect wears off while the risk of physical addiction and psychological dependence increases.  Sleep diary  If the cause isn t obvious and it is not improving, try keeping a  sleep diary  for a couple of weeks. Include in it:    The time you go to bed    How long it takes to fall asleep    How many times you wake up    What time you wake up    Your meal times and what you eat    What time you drink alcohol and caffeine    Your exercise habits and times  Follow-up care  Follow up with your healthcare provider, or as advised. If an X-ray or CT scan was done, you will be notified if there is a change in the reading, especially if it affects treatment.  Call 911  Call 911 if any of these occur:    Trouble breathing    Confusion or trouble waking    Fainting or loss of consciousness    Rapid heart rate    New chest, arm, shoulder, neck or upper back pain    Trouble with speech or vision, weakness of an arm or leg    Trouble walking or talking, loss of balance, numbness or weakness in one side of your body, facial droop  When to seek medical advice  Call your healthcare provider right away if any of these occur:    Extreme restlessness or  irritability    Confusion or hallucinations (seeing or hearing things that are not there)    Anxiety, depression    Several days without sleeping  Date Last Reviewed: 5/1/2018 2000-2019 [employer]. [address] [place] [address], [place] [address]. All rights reserved. This information is not intended as a substitute for professional medical care. Always follow your healthcare professional's instructions.           Patient Education     Understanding the Normal Menstrual Cycle    Having a period (menstruation) is a normal, healthy part of being a woman. It s also part of the menstrual cycle, a process that makes it possible for women to become pregnant. The first day of your period is the first day of your menstrual cycle.   A woman who has irregular cycles can become pregnant during bleeding. This may not be a true menstrual period.   An egg is released  Eggs are female reproductive cells stored in the ovaries. During each cycle, a woman's hormones trigger an egg, (usually 1), to mature and be released from an ovary. This is called ovulation. The egg then travels from the ovary to a fallopian tube.  The egg travels through a tube  The egg moves through the fallopian tube toward the uterus. If sperm are present in the tube, the egg may be fertilized, and pregnancy could result.  The uterine lining grows thicker  The lining of the uterus is made up of blood, tissue, and fluid. During each cycle, hormones cause the lining to thicken. This helps prepare the uterus to receive and nourish a fertilized egg.   The egg and lining are shed  If pregnancy doesn t happen, the egg and thickened lining of the uterus are no longer needed. They are then shed through the vagina. This is called a menstrual period.  How long is each cycle?  It is normal for a cycle to take 21 to 34 days. For teenagers, the time between periods might be as much as 45 days. For adults, it will be around a month from the first day of one period  to the first day of the next. That s why you may hear women talk about a  monthly cycle,  even though cycle length can vary from one month to another, and anywhere from 3 to 5 weeks is normal. Not everyone has a 28-day cycle.    How long does a period last?  It s normal for a period to last 2 to 7 days. Talk to your healthcare provider if your period lasts longer than 7 days for 2 cycles in a row.  Date Last Reviewed: 12/1/2016 2000-2019 [employer]. [address] [place] [address], [place] [address]. All rights reserved. This information is not intended as a substitute for professional medical care. Always follow your healthcare professional's instructions.

## 2020-04-07 ASSESSMENT — ANXIETY QUESTIONNAIRES: GAD7 TOTAL SCORE: 13

## 2020-04-15 ENCOUNTER — TELEPHONE (OUTPATIENT)
Dept: OBGYN | Facility: CLINIC | Age: 25
End: 2020-04-15

## 2020-04-15 NOTE — TELEPHONE ENCOUNTER
Patient wanted to update Madeline Sandra, she cancelled her lab appt., she started getting her period again and will see how it goes.  FYI only, no call back needed.

## 2020-04-16 ENCOUNTER — VIRTUAL VISIT (OUTPATIENT)
Dept: PSYCHOLOGY | Facility: CLINIC | Age: 25
End: 2020-04-16
Payer: COMMERCIAL

## 2020-04-16 DIAGNOSIS — F41.1 GAD (GENERALIZED ANXIETY DISORDER): Primary | ICD-10-CM

## 2020-04-16 PROCEDURE — 99207 ZZC NO BILLABLE SERVICE THIS VISIT: CPT

## 2020-04-16 ASSESSMENT — COLUMBIA-SUICIDE SEVERITY RATING SCALE - C-SSRS
5. HAVE YOU STARTED TO WORK OUT OR WORKED OUT THE DETAILS OF HOW TO KILL YOURSELF? DO YOU INTEND TO CARRY OUT THIS PLAN?: NO
2. HAVE YOU ACTUALLY HAD ANY THOUGHTS OF KILLING YOURSELF LIFETIME?: NO
3. HAVE YOU BEEN THINKING ABOUT HOW YOU MIGHT KILL YOURSELF?: NO
4. HAVE YOU HAD THESE THOUGHTS AND HAD SOME INTENTION OF ACTING ON THEM?: NO
2. HAVE YOU ACTUALLY HAD ANY THOUGHTS OF KILLING YOURSELF?: NO
5. HAVE YOU STARTED TO WORK OUT OR WORKED OUT THE DETAILS OF HOW TO KILL YOURSELF? DO YOU INTEND TO CARRY OUT THIS PLAN?: NO
1. IN THE PAST MONTH, HAVE YOU WISHED YOU WERE DEAD OR WISHED YOU COULD GO TO SLEEP AND NOT WAKE UP?: NO
4. HAVE YOU HAD THESE THOUGHTS AND HAD SOME INTENTION OF ACTING ON THEM?: NO
1. IN THE PAST MONTH, HAVE YOU WISHED YOU WERE DEAD OR WISHED YOU COULD GO TO SLEEP AND NOT WAKE UP?: NO

## 2020-04-16 ASSESSMENT — ANXIETY QUESTIONNAIRES
1. FEELING NERVOUS, ANXIOUS, OR ON EDGE: SEVERAL DAYS
6. BECOMING EASILY ANNOYED OR IRRITABLE: MORE THAN HALF THE DAYS
5. BEING SO RESTLESS THAT IT IS HARD TO SIT STILL: NOT AT ALL
GAD7 TOTAL SCORE: 7
4. TROUBLE RELAXING: SEVERAL DAYS
3. WORRYING TOO MUCH ABOUT DIFFERENT THINGS: SEVERAL DAYS
2. NOT BEING ABLE TO STOP OR CONTROL WORRYING: NOT AT ALL
7. FEELING AFRAID AS IF SOMETHING AWFUL MIGHT HAPPEN: MORE THAN HALF THE DAYS
IF YOU CHECKED OFF ANY PROBLEMS ON THIS QUESTIONNAIRE, HOW DIFFICULT HAVE THESE PROBLEMS MADE IT FOR YOU TO DO YOUR WORK, TAKE CARE OF THINGS AT HOME, OR GET ALONG WITH OTHER PEOPLE: SOMEWHAT DIFFICULT

## 2020-04-16 ASSESSMENT — PATIENT HEALTH QUESTIONNAIRE - PHQ9: SUM OF ALL RESPONSES TO PHQ QUESTIONS 1-9: 8
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
## 2020-04-17 ASSESSMENT — ANXIETY QUESTIONNAIRES: GAD7 TOTAL SCORE: 7

## 2020-04-30 ENCOUNTER — VIRTUAL VISIT (OUTPATIENT)
Dept: PSYCHOLOGY | Facility: CLINIC | Age: 25
End: 2020-04-30
Payer: COMMERCIAL

## 2020-04-30 DIAGNOSIS — F41.1 GAD (GENERALIZED ANXIETY DISORDER): Primary | ICD-10-CM

## 2020-04-30 PROCEDURE — 90791 PSYCH DIAGNOSTIC EVALUATION: CPT | Mod: GT
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
## 2020-05-14 ENCOUNTER — VIRTUAL VISIT (OUTPATIENT)
Dept: PSYCHOLOGY | Facility: CLINIC | Age: 25
End: 2020-05-14
Payer: COMMERCIAL

## 2020-05-14 DIAGNOSIS — F41.1 GAD (GENERALIZED ANXIETY DISORDER): Primary | ICD-10-CM

## 2020-05-14 PROCEDURE — 90834 PSYTX W PT 45 MINUTES: CPT | Mod: GT

## 2020-05-14 NOTE — PROGRESS NOTES
Progress Note    Patient Name: Melina Palacios  Date: 5/14/2020       Service Type: Individual      Session Start Time: 9:30  Session End Time: 10:15     Session Length: 45 mins    Session #: 3    Attendees: Client attended alone    Telemedicine Visit: The patient's condition can be safely assessed and treated via synchronous audio and visual telemedicine encounter.      Reason for Telemedicine Visit: Services only offered telehealth-due to stay at home order.    Originating Site (Patient Location): Patient's home    Distant Site (Provider Location): Provider Remote Setting - home office    Consent:  The patient/guardian has verbally consented to: the potential risks and benefits of telemedicine (video visit) versus in person care; bill my insurance or make self-payment for services provided; and responsibility for payment of non-covered services.     Telemedicine Visit: The patient's condition can be safely assessed and treated via synchronous audio and visual telemedicine encounter.      Mode of Communication:  Video Conference via [employer]    As the provider I attest to compliance with applicable laws and regulations related to telemedicine.       Treatment Plan Last Reviewed: 5/14/2020  PH[place]-9 / [place]-7 : 8 / 7    DATA  Interactive Complexity: No  Crisis: No       Progress Since Last Session (Related to Symptoms / Goals / Homework):   Symptoms: Stable.    Homework: Achieved / completed to satisfaction      Episode of Care Goals: Minimal progress - CONTEMPLATION (Considering change and yet undecided); Intervened by assessing the negative and positive thinking (ambivalence) about behavior change     Current / Ongoing Stressors and Concerns:   Current: Patterns of communication in family system and desire to do better.      Treatment Objective(s) Addressed in This Session:   use at least 2 coping skills for anxiety management in the next 2 weeks  Treatment  planning     Intervention:   CBT: Discussed client's experience with re-engaging in exercise beyond walking and the benefits she is realizing. Supported further focus on integrating even short bursts of exercise like 15 minute videos focused on body weigh movements, yoga, etc.      Treatment planning        ASSESSMENT: Current Emotional / Mental Status (status of significant symptoms):   Risk status (Self / Other harm or suicidal ideation)   Patient denies current fears or concerns for personal safety.   Patient denies current or recent suicidal ideation or behaviors.   Patient denies current or recent homicidal ideation or behaviors.   Patient denies current or recent self injurious behavior or ideation.   Patient denies other safety concerns.   Patient reports there has been no change in risk factors since their last session.     Patient reports there has been no change in protective factors since their last session.     Recommended that patient call 911 or go to the local ED should there be a change in any of these risk factors.     Appearance:   Appropriate    Eye Contact:   Good    Psychomotor Behavior: Normal    Attitude:   Cooperative  Friendly Pleasant   Orientation:   All   Speech    Rate / Production: Normal/ Responsive    Volume:  Normal    Mood:    Anxious    Affect:    Restricted    Thought Content:  Clear    Thought Form:  Coherent  Logical    Insight:    Good      Medication Review:   No current psychiatric medications prescribed     Medication Compliance:   NA     Changes in Health Issues:   None reported     Chemical Use Review:   Substance Use: Chemical use reviewed, no active concerns identified      Tobacco Use: No current tobacco use.      Diagnosis:  1. [place] (generalized anxiety disorder)        Collateral Reports Completed:   Not Applicable    PLAN: (Patient Tasks / Therapist Tasks / Other)  Continue exercise practice, getting outdoors as well. Check in with emotions throughout day.          RAFAEL Stafford, [facility] 5/14/20  Note reviewed and clinical supervision by RAFAEL Kumar [facility] 7/13/2020                                                        ______________________________________________________________________    Treatment Plan    Patient's Name: Melina Palacios  YOB: 1995    Date: 5/14/20    DSM5 Diagnoses: 300.02 (F41.1) Generalized Anxiety Disorder  Psychosocial / Contextual Factors: hx of depression, anxiety, family of origin dysfunction, recent childbirth, desire to parent better  WHODAS:     Referral / Collaboration:  Referral to another professional/service is not indicated at this time..    Anticipated number of session or this episode of care: 15      MeasurableTreatment Goal(s) related to diagnosis / functional impairment(s)  Goal 1: Patient will experience a reduction in anxiety symptoms and report an improved ability to effectively communicate.     I will know I've met my goal when I'm more comfortable and confident  (paraphrase).      Objective #A (Patient Action)    Patient will use relaxation strategies and coping strategies daily to reduce the emotional and physical symptoms of anxiety.  Status: New - Date: 5/14/20     Intervention(s)  Therapist will teach relaxation and coping strategies for anxiety reduction and assign homework..    Objective #B  Patient will learn skill of emotion identification and regulation and proactive communication strategies..  Status: New - Date: 5/14/20     Intervention(s)  Therapist will teach emotional recognition/identification. and regulation skills and proactive communication strategies..    Objective #C  Patient will use thought-stopping strategy daily to reduce intrusive thoughts.  Status: New - Date: 5/14/20     Intervention(s)  Therapist will teach distraction skills. and assign homework..      Patient has not reviewed nor agreed to the above plan.      RAFAEL Stafford, [facility]  May 14, 2020  Note reviewed  and clinical supervision by RAFAEL Kumar [facility] 7/13/2020

## 2020-05-28 ENCOUNTER — VIRTUAL VISIT (OUTPATIENT)
Dept: PSYCHOLOGY | Facility: CLINIC | Age: 25
End: 2020-05-28
Payer: COMMERCIAL

## 2020-05-28 DIAGNOSIS — F41.1 GAD (GENERALIZED ANXIETY DISORDER): Primary | ICD-10-CM

## 2020-05-28 PROCEDURE — 90834 PSYTX W PT 45 MINUTES: CPT | Mod: GT

## 2020-05-28 NOTE — PROGRESS NOTES
Progress Note    Patient Name: Melina Palacios  Date: 5/28/2020       Service Type: Individual      Session Start Time: 9:30  Session End Time: 10:15     Session Length: 45 mins    Session #: 4    Attendees: Client attended alone    Telemedicine Visit: The patient's condition can be safely assessed and treated via synchronous audio and visual telemedicine encounter.      Reason for Telemedicine Visit: Services only offered telehealth-due to stay at home order.    Originating Site (Patient Location): Patient's home    Distant Site (Provider Location): Provider Remote Setting - home office    Consent:  The patient/guardian has verbally consented to: the potential risks and benefits of telemedicine (video visit) versus in person care; bill my insurance or make self-payment for services provided; and responsibility for payment of non-covered services.      Treatment Plan Last Reviewed: 5/14/2020  PHQ-9 / [place]-7 : 8 / 7    DATA  Interactive Complexity: No  Crisis: No       Progress Since Last Session (Related to Symptoms / Goals / Homework):   Symptoms: Stable.    Homework: Achieved / completed to satisfaction      Episode of Care Goals: Minimal progress - CONTEMPLATION (Considering change and yet undecided); Intervened by assessing the negative and positive thinking (ambivalence) about behavior change     Current / Ongoing Stressors and Concerns:   Current: Recognizing need for intellectual stimulation outside of parenting.  Patterns of communication in family system and desire to do better.      Treatment Objective(s) Addressed in This Session:   use at least 2 coping skills for anxiety management in the next 2 weeks  Treatment planning     Intervention:   CBT: Client recognized that motherhood doesn't fulfill the intellectual need for project work that focuses on the same goal, explored options for professional development/intellectual stimulation. Validated experience;  client reflected on experience of her mother's journey and desire to not repeat the pattern. Surfaced experience of lacking in meaningful connection with other new moms and recognizing a need for it. Discussed client's experience with re-engaging in exercise beyond walking and the anxiety reducing benefits she is realizing. Supported further focus on integrating even short bursts of exercise like 15 minute videos focused on body weigh movements, yoga, etc.      Treatment planning        ASSESSMENT: Current Emotional / Mental Status (status of significant symptoms):   Risk status (Self / Other harm or suicidal ideation)   Patient denies current fears or concerns for personal safety.   Patient denies current or recent suicidal ideation or behaviors.   Patient denies current or recent homicidal ideation or behaviors.   Patient denies current or recent self injurious behavior or ideation.   Patient denies other safety concerns.   Patient reports there has been no change in risk factors since their last session.     Patient reports there has been no change in protective factors since their last session.     Recommended that patient call 911 or go to the local ED should there be a change in any of these risk factors.     Appearance:   Appropriate    Eye Contact:   Good    Psychomotor Behavior: Normal    Attitude:   Cooperative  Friendly Pleasant   Orientation:   All   Speech    Rate / Production: Normal/ Responsive    Volume:  Normal    Mood:    Anxious    Affect:    Restricted    Thought Content:  Clear    Thought Form:  Coherent  Logical    Insight:    Good      Medication Review:   No current psychiatric medications prescribed     Medication Compliance:   NA     Changes in Health Issues:   None reported     Chemical Use Review:   Substance Use: Chemical use reviewed, no active concerns identified      Tobacco Use: No current tobacco use.      Diagnosis:  1. [place] (generalized anxiety disorder)        Collateral Reports  Completed:   Not Applicable    PLAN: (Patient Tasks / Therapist Tasks / Other)  Continue exercise practice, getting outdoors as well. Check in with emotions throughout day.   Start working on learning coding with , exploring project activities to nurture relationship.  Reach out to mom group mom to explore group opportunities and time to walk.       RAFAEL Stafford, [facility] 5/28/20  Note reviewed and clinical supervision by RAFAEL Kumar [facility] 6/8/2020                                                         ______________________________________________________________________    Treatment Plan    Patient's Name: Melina Palacios  YOB: 1995    Date: 5/14/20    DSM5 Diagnoses: 300.02 (F41.1) Generalized Anxiety Disorder  Psychosocial / Contextual Factors: hx of depression, anxiety, family of origin dysfunction, recent childbirth, desire to parent better  WHODAS:     Referral / Collaboration:  Referral to another professional/service is not indicated at this time.    Anticipated number of session or this episode of care: 15      MeasurableTreatment Goal(s) related to diagnosis / functional impairment(s)  Goal 1: Patient will experience a reduction in anxiety symptoms and report an improved ability to effectively communicate.     I will know I've met my goal when I'm more comfortable and confident  (paraphrase).      Objective #A (Patient Action)    Patient will use relaxation strategies and coping strategies daily to reduce the emotional and physical symptoms of anxiety. Patient will connect with new moms in community for connection and support.   Status: New - Date: 5/14/20     Intervention(s)  Therapist will teach relaxation and coping strategies for anxiety reduction and assign homework for practices as well as developing mom community connections.    Objective #B  Patient will learn skill of emotion identification and regulation and proactive communication strategies..  Status: New  - Date: 5/14/20     Intervention(s)  Therapist will teach emotional recognition/identification. and regulation skills and proactive communication strategies..    Objective #C  Patient will use thought-stopping strategy daily to reduce intrusive thoughts.  Status: New - Date: 5/14/20     Intervention(s)  Therapist will teach thought stopping and cognitive distortion recognition skills. and assign homework..    Objective #D  Patient will explore and pursue professional and personal development options  Status: New - Date: 5/14/20     Intervention(s)  Therapist will support exploration and assign homework..    Patient has not reviewed nor agreed to the above plan.      RAFAEL Stafford, LGSW  May 14, 2020  Note reviewed and clinical supervision [facility]

## 2020-06-11 ENCOUNTER — VIRTUAL VISIT (OUTPATIENT)
Dept: PSYCHOLOGY | Facility: CLINIC | Age: 25
End: 2020-06-11
Payer: COMMERCIAL

## 2020-06-11 DIAGNOSIS — F41.1 GAD (GENERALIZED ANXIETY DISORDER): Primary | ICD-10-CM

## 2020-06-11 PROCEDURE — 90834 PSYTX W PT 45 MINUTES: CPT | Mod: GT

## 2020-06-11 NOTE — PROGRESS NOTES
Progress Note    Patient Name: Melina Palacios  Date: 6/11/2020       Service Type: Individual      Session Start Time: 9:40  Session End Time: 10:20     Session Length: 40 mins    Session #: 5    Attendees: Client attended alone    Telemedicine Visit: The patient's condition can be safely assessed and treated via synchronous audio and visual telemedicine encounter.      Reason for Telemedicine Visit: Services only offered telehealth-due to stay at home order.    Originating Site (Patient Location): Patient's home    Distant Site (Provider Location): Provider Remote Setting - home office    Consent:  The patient/guardian has verbally consented to: the potential risks and benefits of telemedicine (video visit) versus in person care; bill my insurance or make self-payment for services provided; and responsibility for payment of non-covered services.      Treatment Plan Last Reviewed: 5/14/2020  PHQ-9 / [place]-7 : 8 / 7    DATA  Interactive Complexity: No  Crisis: No       Progress Since Last Session (Related to Symptoms / Goals / Homework):   Symptoms: Stable.    Homework: Achieved / completed to satisfaction      Episode of Care Goals: Minimal progress - CONTEMPLATION (Considering change and yet undecided); Intervened by assessing the negative and positive thinking (ambivalence) about behavior change     Current / Ongoing Stressors and Concerns:   Current: News of pregnancy and Recognizing need for intellectual stimulation outside of parenting.  Patterns of communication in family system and desire to do better.      Treatment Objective(s) Addressed in This Session:   use at least 2 coping skills for anxiety management in the next 2 weeks   Patient will learn skill of emotion identification and regulation and proactive communication strategies.     Intervention:   CBT: Client recognized that her looking young for her age, combined with being self conscious about being a  younger mom in her community with less of a professional back ground makes her hesitate and feel awkward about reaching out to others for connection. Examined evidence of her experiences and the needs that all mothers have for connection, along with government mandated permission for social contact to validate reasons for making connection. Surfaced differences in approach/attention given to mapping a family/professional guide at this time as proactive in contrast to mother's reactive response. Reinforced client's practice of daily exercise and the benefits she is realizing.         ASSESSMENT: Current Emotional / Mental Status (status of significant symptoms):   Risk status (Self / Other harm or suicidal ideation)   Patient denies current fears or concerns for personal safety.   Patient denies current or recent suicidal ideation or behaviors.   Patient denies current or recent homicidal ideation or behaviors.   Patient denies current or recent self injurious behavior or ideation.   Patient denies other safety concerns.   Patient reports there has been no change in risk factors since their last session.     Patient reports there has been no change in protective factors since their last session.     Recommended that patient call 911 or go to the local ED should there be a change in any of these risk factors.     Appearance:   Appropriate    Eye Contact:   Good    Psychomotor Behavior: Normal    Attitude:   Cooperative  Friendly Pleasant   Orientation:   All   Speech    Rate / Production: Normal/ Responsive    Volume:  Normal    Mood:    Normal   Affect:    Appropriate    Thought Content:  Clear    Thought Form:  Coherent  Logical    Insight:    Good      Medication Review:   No current psychiatric medications prescribed     Medication Compliance:   NA     Changes in Health Issues:   None reported     Chemical Use Review:   Substance Use: Chemical use reviewed, no active concerns identified      Tobacco Use: No  current tobacco use.      Diagnosis:  1. [place] (generalized anxiety disorder)        Collateral Reports Completed:   Not Applicable    PLAN: (Patient Tasks / Therapist Tasks / Other)  Continue exercise practice, getting outdoors as well. Check in with emotions throughout day.   Start working on learning coding with , exploring project activities to nurture relationship.  Reach out to mom group mom to explore group opportunities and time to walk.       RAFAEL Stafford, [facility]     6/11/20                  Note reviewed and clinical supervision by RAFAEL Kumar [facility] 6/22/2020                                      ______________________________________________________________________    Treatment Plan    Patient's Name: Melina Palacios  YOB: 1995    Date: 5/14/20    DSM5 Diagnoses: 300.02 (F41.1) Generalized Anxiety Disorder  Psychosocial / Contextual Factors: hx of depression, anxiety, family of origin dysfunction, recent childbirth, desire to parent better  WHODAS:     Referral / Collaboration:  Referral to another professional/service is not indicated at this time.    Anticipated number of session or this episode of care: 15      MeasurableTreatment Goal(s) related to diagnosis / functional impairment(s)  Goal 1: Patient will experience a reduction in anxiety symptoms and report an improved ability to effectively communicate.     I will know I've met my goal when I'm more comfortable and confident  (paraphrase).      Objective #A (Patient Action)    Patient will use relaxation strategies and coping strategies daily to reduce the emotional and physical symptoms of anxiety. Patient will connect with new moms in community for connection and support.   Status: New - Date: 5/14/20     Intervention(s)  Therapist will teach relaxation and coping strategies for anxiety reduction and assign homework for practices as well as developing mom community connections.    Objective #B  Patient will  learn skill of emotion identification and regulation and proactive communication strategies..  Status: New - Date: 5/14/20     Intervention(s)  Therapist will teach emotional recognition/identification. and regulation skills and proactive communication strategies..    Objective #C  Patient will use thought-stopping strategy daily to reduce intrusive thoughts.  Status: New - Date: 5/14/20     Intervention(s)  Therapist will teach thought stopping and cognitive distortion recognition skills. and assign homework..    Objective #D  Patient will explore and pursue professional and personal development options  Status: New - Date: 5/14/20     Intervention(s)  Therapist will support exploration and assign homework..    Patient has not reviewed nor agreed to the above plan.      RAFAEL Stafford, LGSW  May 14, 2020  Note reviewed and clinical supervision by RAFAEL Kumar [facility] 6/8/2020

## 2020-06-13 ENCOUNTER — NURSE TRIAGE (OUTPATIENT)
Dept: NURSING | Facility: CLINIC | Age: 25
End: 2020-06-13

## 2020-06-13 NOTE — TELEPHONE ENCOUNTER
Melina got a positive pregnancy test.  Melina noticed slight bleeding .  Denies fever cough and shortness of breath.  Denies dizziness and clots and abdominal pain.    COVID 19 Nurse Triage Plan/Patient Instructions    Please be aware that novel coronavirus (COVID-19) may be circulating in the community. If you develop symptoms such as fever, cough, or SOB or if you have concerns about the presence of another infection including coronavirus (COVID-19), please contact your health care provider or visit [email or web address].     Disposition/Instructions    Patient to stay at home and follow home care protocol based instructions.     Thank you for taking steps to prevent the spread of this virus.  o Limit your contact with others.  o Wear a simple mask to cover your cough.  o Wash your hands well and often.    Resources    [facility]: About COVID-19: [email or web address]    CDC: What to Do If You're Sick: www.cdc.gov/coronavirus/2019-ncov/about/steps-when-sick.html    CDC: Ending Home Isolation: www.cdc.gov/coronavirus/2019-ncov/hcp/disposition-in-home-patients.html     CDC: Caring for Someone: www.cdc.gov/coronavirus/2019-ncov/if-you-are-sick/care-for-someone.html     [facility]: Interim Guidance for Hospital Discharge to Home: www.health.[facility]/diseases/coronavirus/hcp/hospdischarge.pdf    [facility] clinical trials (COVID-19 research studies): [email or web address]-clinical-trials     Below are the COVID-19 hotlines at the [facility] ([facility]). Interpreters are available.   o For health questions: Call 127-268-6256 or 1-468.242.6387 (7 a.m. to 7 p.m.)  o For questions about schools and childcare: Call 347-214-4496 or 1-623.944.2138 (7 a.m. to 7 p.m.)                       Additional Information    Negative: Shock suspected (e.g., cold/pale/clammy skin, too weak to stand, low BP, rapid pulse)    Negative: Difficult to awaken or acting confused (e.g., disoriented, slurred  "speech)    Negative: Passed out (i.e., lost consciousness, collapsed and was not responding)    Negative: Sounds like a life-threatening emergency to the triager    Negative: SEVERE abdominal pain    Negative: [1] SEVERE vaginal bleeding (i.e., soaking 2 pads / hour, large blood clots) AND [2] present 2 or more hours    Negative: SEVERE dizziness (e.g., unable to stand, requires support to walk, feels like passing out)    Negative: [1] MODERATE vaginal bleeding (i.e., soaking 1 pad / hour; clots) AND [2] present > 6 hours    Negative: [1] MODERATE vaginal bleeding (i.e., soaking 1 pad / hour; clots) AND [2] pregnant > 12 weeks    Negative: Passed tissue (e.g., gray-white)    Negative: Shoulder pain    Negative: Pale skin (pallor) of new onset or worsening    Negative: Patient sounds very sick or weak to the triager    Negative: [1] Constant abdominal pain AND [2] present > 2 hours    Negative: Fever > 100.4 F (38.0 C)    Negative: [1] Intermittent lower abdominal pain (e.g., cramping) AND [2] present > 24 hours    Negative: Prior history of \"ectopic pregnancy\" or previous tubal surgery (e.g., tubal ligation)    Negative: Pain or burning with passing urine (urination)    Negative: MODERATE vaginal bleeding (i.e., soaking 1 pad / hour; clots)    Negative: Has IUD    Negative: MILD vaginal bleeding (i.e., less than 1 pad / hour; less than patient's usual menstrual bleeding; not just spotting)    Negative: SPOTTING lasts > 48 hours or spotting happens more than once in a week    Negative: Unusual vaginal discharge (e.g., bad smelling, yellow, green, or foamy-white)    Negative: Not feeling pregnant any longer (e.g., breast tenderness or nausea has disappeared)    Negative: SPOTTING after sexual intercourse (single or brief episode)    Negative: SPOTTING after a pelvic examination (single or brief episode)    SPOTTING (single or brief episode)    Protocols used: PREGNANCY - VAGINAL BLEEDING LESS THAN 20 WEEKS " JEANCARLOS

## 2020-06-15 ENCOUNTER — TELEPHONE (OUTPATIENT)
Dept: MIDWIFE SERVICES | Facility: CLINIC | Age: 25
End: 2020-06-15

## 2020-06-15 DIAGNOSIS — O36.80X0 PREGNANCY WITH INCONCLUSIVE FETAL VIABILITY: Primary | ICD-10-CM

## 2020-06-15 NOTE — TELEPHONE ENCOUNTER
Patient informed of comments from the midwives.  Feels it would be hard to wait 3 weeks with out knowing.  Is going to check with  and call for blood draw appointments on Wednesday and Friday.    Pt verbalized understanding, in agreement with plan, and voiced no further questions.  Verena Sotelo RN on 6/15/2020 at 10:20 AM

## 2020-06-15 NOTE — TELEPHONE ENCOUNTER
"LMP 4w 4d    On Friday patient had orgasm without intercourse. On Saturday she had some dark blood with a few clots.  Through the weekend, the spotting continued to be less and less of the dark brown color. Denies cramping, pain, bright red bleeding. She wonders if she is still pregnant.     She states that she felt pregnant before and now does not. Informed that 1st trimester bleeding common, implantation spotting can occur at 4 weeks, area could have been irritated by the \"fooling around\".      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  Informed that it was too early for US.  Would like just do that when possible.    Routing to provider to advise.  Verena Sotelo RN on 6/15/2020 at 9:15 AM    "

## 2020-06-15 NOTE — TELEPHONE ENCOUNTER
We would be to have her come in about 2.5 weeks for US, she would then be about 7 weeks at that time. I agree with some spotting and bleeding can be normal in early pregnancy. US is the best tool but most accurate between 7-8 weeks and if done too early then likely will need to be repeated just as blood draws. Serial beta would only need to be two blood draws if that would be anymore doable for her? She can come anytime for that for reassurance if that would be a comfort to her. Either option is fine. Would recommend she call back with heavier bleeding/cramping.     MOHSEN Boo, CNM

## 2020-07-07 ENCOUNTER — OFFICE VISIT (OUTPATIENT)
Dept: OBGYN | Facility: CLINIC | Age: 25
End: 2020-07-07
Payer: COMMERCIAL

## 2020-07-07 VITALS
SYSTOLIC BLOOD PRESSURE: 104 MMHG | HEART RATE: 94 BPM | WEIGHT: 130 LBS | HEIGHT: 68 IN | DIASTOLIC BLOOD PRESSURE: 60 MMHG | BODY MASS INDEX: 19.7 KG/M2

## 2020-07-07 DIAGNOSIS — N89.8 VAGINAL DISCHARGE: Primary | ICD-10-CM

## 2020-07-07 DIAGNOSIS — R30.0 DYSURIA: ICD-10-CM

## 2020-07-07 LAB
ALBUMIN UR-MCNC: ABNORMAL MG/DL
APPEARANCE UR: ABNORMAL
BACTERIA #/AREA URNS HPF: ABNORMAL /HPF
BILIRUB UR QL STRIP: NEGATIVE
COLOR UR AUTO: YELLOW
GLUCOSE UR STRIP-MCNC: NEGATIVE MG/DL
HGB UR QL STRIP: NEGATIVE
KETONES UR STRIP-MCNC: NEGATIVE MG/DL
LEUKOCYTE ESTERASE UR QL STRIP: ABNORMAL
NITRATE UR QL: NEGATIVE
NON-SQ EPI CELLS #/AREA URNS LPF: ABNORMAL /LPF
PH UR STRIP: 6 PH (ref 5–7)
RBC #/AREA URNS AUTO: ABNORMAL /HPF
SOURCE: ABNORMAL
SP GR UR STRIP: >1.03 (ref 1–1.03)
SPECIMEN SOURCE: NORMAL
UROBILINOGEN UR STRIP-ACNC: 0.2 EU/DL (ref 0.2–1)
WBC #/AREA URNS AUTO: ABNORMAL /HPF
WET PREP SPEC: NORMAL

## 2020-07-07 PROCEDURE — 81001 URINALYSIS AUTO W/SCOPE: CPT | Performed by: OBSTETRICS & GYNECOLOGY

## 2020-07-07 PROCEDURE — 99213 OFFICE O/P EST LOW 20 MIN: CPT | Performed by: OBSTETRICS & GYNECOLOGY

## 2020-07-07 PROCEDURE — 87210 SMEAR WET MOUNT SALINE/INK: CPT | Performed by: OBSTETRICS & GYNECOLOGY

## 2020-07-07 ASSESSMENT — MIFFLIN-ST. JEOR: SCORE: 1383.18

## 2020-07-07 NOTE — PROGRESS NOTES
SUBJECTIVE:                                                   Melina Palacios is a 24 year old female who presents to clinic today for the following health issue(s):  Patient presents with:  Vaginal Problem: Past week patient has vaginal discharge, vaginal odor and itching. Patient has been having back pain and pelvic pain. Patient states she is currently 7 weeks pregnant.         HPI:  Had ultrasound at another clinic showing viable IUP and 4cm cyst.   Having increased vaginal discharge and itching. Hx of BV in last preg with admission for pain. Concerned if she has another infection.   Some pelvic and back pain.      Patient's last menstrual period was 2020..     Patient is sexually active, .  Using condoms for contraception.    reports that she has never smoked. She has never used smokeless tobacco.    STD testing offered?  Declined    Health maintenance updated:  yes    Today's PHQ-2 Score:   PHQ-2 (  [employer]) 2020   Q1: Little interest or pleasure in doing things 2   Q2: Feeling down, depressed or hopeless 2   PHQ-2 Score 4     Today's PHQ-9 Score:   PHQ-9 SCORE 2020   PHQ-9 Total Score 8     Today's NY-7 Score:   NY-7 SCORE 2020   Total Score 7       Problem list and histories reviewed & adjusted, as indicated.  Additional history: as documented.    Patient Active Problem List   Diagnosis     Situational anxiety     Irritable bowel syndrome with diarrhea     History of UTI     Past Surgical History:   Procedure Laterality Date     ENT SURGERY      wisdom teeth extraction      wisdom teeth        Social History     Tobacco Use     Smoking status: Never Smoker     Smokeless tobacco: Never Used   Substance Use Topics     Alcohol use: No     Frequency: Never     Binge frequency: Never      Problem (# of Occurrences) Relation (Name,Age of Onset)    Breast Cancer (3) Mother, Maternal Aunt, Paternal Grandmother    Cancer (2) Mother, Maternal Aunt    No Known Problems (5) Father,  "Sister, Brother, Maternal Grandfather, Other    Thyroid Disease (1) Maternal Grandmother            Current Outpatient Medications   Medication Sig     Prenat w/o S-MT-Knfqjiy-FA-DHA (PNV-DHA PO)      No current facility-administered medications for this visit.      Allergies   Allergen Reactions     Azithromycin Nausea and Vomiting     In high school   Other reaction(s): GI Upset  Other reaction(s): Gastrointestinal  Other reaction(s): Vomiting     Penicillins Hives     Amoxicillin Hives and Rash     In high school        ROS:  12 point review of systems negative other than symptoms noted below or in the HPI.  Genitourinary: Pelvic Pain, Vaginal Discharge, Vaginal Itching and vaginal odor        OBJECTIVE:     /60 (BP Location: Right arm, Patient Position: Sitting, Cuff Size: Adult Regular)   Pulse 94   Ht 1.727 m (5' 8\")   Wt 59 kg (130 lb)   LMP 05/14/2020   Breastfeeding No   BMI 19.77 kg/m    Body mass index is 19.77 kg/m .    Exam:  Constitutional:  Appearance: Well nourished, well developed alert, in no acute distress  Gastrointestinal:  Abdominal Examination:  Abdomen nontender to palpation, tone normal without rigidity or guarding, no masses present, umbilicus without lesions; Liver/Spleen:  No hepatomegaly present, liver nontender to palpation; Hernias:  No hernias present  Neurologic:  Mental Status:  Oriented X3.  Normal strength and tone, sensory exam grossly normal, mentation intact and speech normal.    Psychiatric:  Mentation appears normal and affect normal/bright.  Pelvic Exam:  External Genitalia:     Normal appearance for age, no discharge present, no tenderness present, no inflammatory lesions present, color normal  Vagina:     Normal vaginal vault without central or paravaginal defects, no discharge present, no inflammatory lesions present, no masses present  Bladder:     Nontender to palpation  Urethra:   Urethral Body:  Urethra palpation normal, urethra structural support " normal   Urethral Meatus:  No erythema or lesions present  Cervix:     Appearance healthy, no lesions present, nontender to palpation, no bleeding present  Uterus:     Uterus: firm, normal 7 week sized and nontender, retroverted in position.   Adnexa:     No adnexal tenderness present, no adnexal masses present  Perineum:     Perineum within normal limits, no evidence of trauma, no rashes or skin lesions present  Anus:     Anus within normal limits, no hemorrhoids present  Inguinal Lymph Nodes:     No lymphadenopathy present  Pubic Hair:     Normal pubic hair distribution for age  Genitalia and Groin:     No rashes present, no lesions present, no areas of discoloration, no masses present       In-Clinic Test Results:  Wet prep: NO YEAST, NO CLUE, NO TRICH    ASSESSMENT/PLAN:                                                        ICD-10-CM    1. Dysuria  R30.0 UA with Microscopic   2. Vaginal discharge  N89.8 Wet prep         No findings. Vaginal d/c normal. Wet prep normal. Possible pain from ovarian cyst. Pt in no distress. Will observe for now.   RTC for NOB and repeat ultrasound.    Deshawn Barber MD  [facility] [place]

## 2020-07-09 DIAGNOSIS — O36.80X0 PREGNANCY WITH INCONCLUSIVE FETAL VIABILITY: Primary | ICD-10-CM

## 2020-07-09 NOTE — PROGRESS NOTES
Pt has NOB scheduled with ultrasound. Needed order to be placed. Future ultrasound order placed and linked with appt. Closing encounter.   Lola Mcwilliams RN on 7/9/2020 at 3:20 PM

## 2020-07-13 ENCOUNTER — PRENATAL OFFICE VISIT (OUTPATIENT)
Dept: NURSING | Facility: CLINIC | Age: 25
End: 2020-07-13
Payer: COMMERCIAL

## 2020-07-13 VITALS — HEIGHT: 68 IN | BODY MASS INDEX: 19.7 KG/M2 | WEIGHT: 130 LBS

## 2020-07-13 DIAGNOSIS — Z13.79 GENETIC SCREENING: Primary | ICD-10-CM

## 2020-07-13 DIAGNOSIS — O09.91 SUPERVISION OF HIGH RISK PREGNANCY IN FIRST TRIMESTER: ICD-10-CM

## 2020-07-13 PROBLEM — O09.90 SUPERVISION OF HIGH-RISK PREGNANCY: Status: ACTIVE | Noted: 2020-07-13

## 2020-07-13 PROCEDURE — 99207 ZZC NO CHARGE NURSE ONLY: CPT

## 2020-07-13 ASSESSMENT — MIFFLIN-ST. JEOR: SCORE: 1383.18

## 2020-07-13 NOTE — PROGRESS NOTES
"    SUBJECTIVE:     HPI:    This is a 24 year old female patient,  who presents for her first obstetrical visit.    SOPHIA: 2021, by Last Menstrual Period.  She is 8w4d weeks.  Her cycles are regular.  Her last menstrual period was normal.   Since her LMP, she has experienced  nausea and back pain left side.    Have you travelled during the pregnancy?No  Have your sexual partner(s) travelled during the pregnancy?No      HISTORY:   Planned Pregnancy: Yes  Marital Status:   Occupation: stay at home  Living in Household:  Kevin and son Hira    Past History:  Her past medical history   Past Medical History:   Diagnosis Date     Depressive disorder      NY (generalized anxiety disorder)      IBS (irritable bowel syndrome)      Other immediate postpartum hemorrhage 2019     Ovarian cyst      Third degree laceration of perineum during delivery, postpartum 2019     Vacuum extractor delivery, delivered 2019   .      She has a history of  uncomplicated pregnancy VAD with 3rd degree lac and PPH    Since her last LMP she denies use of alcohol, tobacco and street drugs.    Past medical, surgical, social and family history were reviewed and updated in [facility].      Current Outpatient Medications   Medication     Prenat w/o Z-DB-Clsdyoe-FA-DHA (PNV-DHA PO)     No current facility-administered medications for this visit.        ROS:   12 point review of systems negative other than symptoms noted below or in the HPI.  Gastrointestinal: Nausea  Musculoskeletal: back pain L side      OBJECTIVE:     EXAM:  Ht 1.727 m (5' 8\")   Wt 59 kg (130 lb)   LMP 2020   BMI 19.77 kg/m   Body mass index is 19.77 kg/m .      "

## 2020-07-22 ENCOUNTER — TELEPHONE (OUTPATIENT)
Dept: OBGYN | Facility: CLINIC | Age: 25
End: 2020-07-22

## 2020-07-22 NOTE — TELEPHONE ENCOUNTER
Patient is scheduled for her U/S and 1st ob tomorrow with . She is very upset that we have to do a Transvaginal U/s and has major anxiety. Please call and explain why.

## 2020-07-22 NOTE — PROGRESS NOTES
SUBJECTIVE:      HPI:     This is a 24 year old female patient,  who presents for her first obstetrical visit.     SOPHIA: 2021, by Last Menstrual Period.  She is 8w4d weeks.  Her cycles are regular.  Her last menstrual period was normal.   Since her LMP, she has experienced  nausea and back pain left side.     Have you travelled during the pregnancy?No  Have your sexual partner(s) travelled during the pregnancy?No      Melina was followed by the Midwives in her last pregnancy and ended up needing a vacuum assisted vaginal delivery and sustained a third degree laceration. She has not had any problems and reports excellent healing from her repair by Dr. Harrison. She is stating now that she has increased levels of anxiety that is worse than it was in her prior pregnancy. She would like to see someone for therapy. She was given a referral within [place] and did not like the experience. She has been having bilateral hip pain and cannot say for certain that she is symptomatic from her left ovarian cyst. She reports being a hard stick and would like to return for the blood work in the future.       HISTORY:   Planned Pregnancy: Yes  Marital Status:   Occupation: stay at home  Living in Household:  Kevin and son Hira     Past History:  Her past medical history   Past Medical History        Past Medical History:   Diagnosis Date     Depressive disorder       NY (generalized anxiety disorder)       IBS (irritable bowel syndrome)       Other immediate postpartum hemorrhage 2019     Ovarian cyst       Third degree laceration of perineum during delivery, postpartum 2019     Vacuum extractor delivery, delivered 2019      .       She has a history of  uncomplicated pregnancy VAD with 3rd degree lac and PPH     Since her last LMP she denies use of alcohol, tobacco and street drugs.     Past medical, surgical, social and family history were reviewed and updated in [facility].             Current Outpatient Medications   Medication     Prenat w/o Z-DA-Uzcyant-FA-DHA (PNV-DHA PO)      No current facility-administered medications for this visit.         ROS:   12 point review of systems negative other than symptoms noted below or in the HPI.  Gastrointestinal: Nausea  Musculoskeletal: back pain L side      OBJECTIVE:     EXAM:  /52   Wt 59.7 kg (131 lb 9.6 oz)   LMP 2020   BMI 20.01 kg/m   Body mass index is 20.01 kg/m .    GENERAL: healthy, alert and no distress  EYES: Eyes grossly normal to inspection, PERRL and conjunctivae and sclerae normal  HENT: ear canals and TM's normal, nose and mouth without ulcers or lesions  NECK: no adenopathy, no asymmetry, masses, or scars and thyroid normal to palpation  RESP: lungs clear to auscultation - no rales, rhonchi or wheezes  BREAST: normal without masses, tenderness or nipple discharge and no palpable axillary masses or adenopathy  CV: regular rate and rhythm, normal S1 S2, no S3 or S4, no murmur, click or rub, no peripheral edema and peripheral pulses strong  ABDOMEN: soft, nontender, no hepatosplenomegaly, no masses and bowel sounds normal  MS: no gross musculoskeletal defects noted, no edema  SKIN: no suspicious lesions or rashes  NEURO: Normal strength and tone, mentation intact and speech normal  PSYCH: mentation appears normal, affect normal/bright    ASSESSMENT/PLAN:       ICD-10-CM    1. Supervision of high risk pregnancy in first trimester  O09.91 Urine Culture Aerobic Bacterial     *UA reflex to Microscopic       24 year old , On 2020 patient is 10w0d weeks of pregnancy with SOPHIA of 2021, by Last Menstrual Period    Discussed as follows:Aneuploidy screening. Ok to return for her labs in the future. Management of anxiety in pregnancy. At this time she would like to start with therapy. Melina had a very flat affect during the entire visit.     Counseling given:   - Follow up in 2 weeks for return OB visit due to  ZEYAD.  - Recommended weight gain for pregnancy: 25-35 lbs.     Keya Graf MD

## 2020-07-22 NOTE — TELEPHONE ENCOUNTER
Spoke with Rosalind tate-at almost 10 wks gestation and normal BMI should be able to do an abd us not transvaginal. Called pt and left message we should be able to do an abd us.  Carmen Edward RN on 7/22/2020 at 10:44 AM

## 2020-07-23 ENCOUNTER — PRENATAL OFFICE VISIT (OUTPATIENT)
Dept: OBGYN | Facility: CLINIC | Age: 25
End: 2020-07-23
Payer: COMMERCIAL

## 2020-07-23 ENCOUNTER — ANCILLARY PROCEDURE (OUTPATIENT)
Dept: ULTRASOUND IMAGING | Facility: CLINIC | Age: 25
End: 2020-07-23
Payer: COMMERCIAL

## 2020-07-23 VITALS — SYSTOLIC BLOOD PRESSURE: 110 MMHG | BODY MASS INDEX: 20.01 KG/M2 | DIASTOLIC BLOOD PRESSURE: 52 MMHG | WEIGHT: 131.6 LBS

## 2020-07-23 DIAGNOSIS — O09.91 SUPERVISION OF HIGH RISK PREGNANCY IN FIRST TRIMESTER: ICD-10-CM

## 2020-07-23 DIAGNOSIS — O36.80X0 PREGNANCY WITH INCONCLUSIVE FETAL VIABILITY: ICD-10-CM

## 2020-07-23 PROCEDURE — 87086 URINE CULTURE/COLONY COUNT: CPT | Performed by: OBSTETRICS & GYNECOLOGY

## 2020-07-23 PROCEDURE — 76801 OB US < 14 WKS SINGLE FETUS: CPT | Performed by: OBSTETRICS & GYNECOLOGY

## 2020-07-23 PROCEDURE — 99207 ZZC FIRST OB VISIT: CPT | Performed by: OBSTETRICS & GYNECOLOGY

## 2020-07-23 PROCEDURE — 81003 URINALYSIS AUTO W/O SCOPE: CPT | Performed by: OBSTETRICS & GYNECOLOGY

## 2020-07-24 ENCOUNTER — TELEPHONE (OUTPATIENT)
Dept: OBGYN | Facility: CLINIC | Age: 25
End: 2020-07-24

## 2020-07-24 NOTE — TELEPHONE ENCOUNTER
Management of anxiety in pregnancy. At this time she would like to start with therapy. Melina had a very flat affect during the entire visit.      Routing to provider to advise.  Verena Sotelo RN on 7/24/2020 at 2:59 PM

## 2020-07-24 NOTE — TELEPHONE ENCOUNTER
I placed it on their website yesterday (obviously it doesn't work). I placed a referral for anxiety. What else would they like me to provide?

## 2020-07-24 NOTE — TELEPHONE ENCOUNTER
[facility] calling to follow up on referral - patient scheduled for 8/3 at 1pm with Karin Malcolm.

## 2020-07-25 LAB
BACTERIA SPEC CULT: NORMAL
Lab: NORMAL
SPECIMEN SOURCE: NORMAL

## 2020-07-27 NOTE — TELEPHONE ENCOUNTER
This was an FYI- pt scheduled per referral placed by ME on website.    Closing Encounter.    Lola Mcwilliams RN on 7/27/2020 at 11:58 AM

## 2020-07-30 ENCOUNTER — TELEPHONE (OUTPATIENT)
Dept: OBGYN | Facility: CLINIC | Age: 25
End: 2020-07-30

## 2020-07-30 DIAGNOSIS — O99.891 BACK PAIN IN PREGNANCY: Primary | ICD-10-CM

## 2020-07-30 DIAGNOSIS — M54.9 BACK PAIN IN PREGNANCY: Primary | ICD-10-CM

## 2020-07-30 NOTE — TELEPHONE ENCOUNTER
Hip pain that wraps around to lower back for last 2 weeks has been worsening  Calling as she discussed this w Dr Graf at her last OV and she mentioned therapy referral. Asking if this can be placed or does she have to wait until next OV?  Next OV with Dr Be 8/7/20  Dr Harrison 9/4/20  Consulted with Dr Graf  [facility] referral placed per Dr Graf's order    Number given to pt.  Pt verbalized understanding, in agreement with plan, and voiced no further questions.  Lola Mcwilliams RN on 7/30/2020 at 12:56 PM

## 2020-08-07 ENCOUNTER — PRENATAL OFFICE VISIT (OUTPATIENT)
Dept: OBGYN | Facility: CLINIC | Age: 25
End: 2020-08-07
Payer: COMMERCIAL

## 2020-08-07 VITALS — BODY MASS INDEX: 20.07 KG/M2 | DIASTOLIC BLOOD PRESSURE: 72 MMHG | SYSTOLIC BLOOD PRESSURE: 102 MMHG | WEIGHT: 132 LBS

## 2020-08-07 DIAGNOSIS — Z13.79 GENETIC SCREENING: ICD-10-CM

## 2020-08-07 DIAGNOSIS — O09.91 SUPERVISION OF HIGH RISK PREGNANCY IN FIRST TRIMESTER: ICD-10-CM

## 2020-08-07 LAB
ABO + RH BLD: NORMAL
ABO + RH BLD: NORMAL
BLD GP AB SCN SERPL QL: NORMAL
BLOOD BANK CMNT PATIENT-IMP: NORMAL
ERYTHROCYTE [DISTWIDTH] IN BLOOD BY AUTOMATED COUNT: 14.3 % (ref 10–15)
HCT VFR BLD AUTO: 41.4 % (ref 35–47)
HGB BLD-MCNC: 13.4 G/DL (ref 11.7–15.7)
MCH RBC QN AUTO: 27.7 PG (ref 26.5–33)
MCHC RBC AUTO-ENTMCNC: 32.4 G/DL (ref 31.5–36.5)
MCV RBC AUTO: 86 FL (ref 78–100)
PLATELET # BLD AUTO: 213 10E9/L (ref 150–450)
RBC # BLD AUTO: 4.84 10E12/L (ref 3.8–5.2)
SPECIMEN EXP DATE BLD: NORMAL
WBC # BLD AUTO: 10.1 10E9/L (ref 4–11)

## 2020-08-07 PROCEDURE — 86850 RBC ANTIBODY SCREEN: CPT | Performed by: OBSTETRICS & GYNECOLOGY

## 2020-08-07 PROCEDURE — 86901 BLOOD TYPING SEROLOGIC RH(D): CPT | Performed by: OBSTETRICS & GYNECOLOGY

## 2020-08-07 PROCEDURE — 85027 COMPLETE CBC AUTOMATED: CPT | Performed by: OBSTETRICS & GYNECOLOGY

## 2020-08-07 PROCEDURE — 36415 COLL VENOUS BLD VENIPUNCTURE: CPT | Performed by: OBSTETRICS & GYNECOLOGY

## 2020-08-07 PROCEDURE — 87340 HEPATITIS B SURFACE AG IA: CPT | Performed by: OBSTETRICS & GYNECOLOGY

## 2020-08-07 PROCEDURE — 86762 RUBELLA ANTIBODY: CPT | Performed by: OBSTETRICS & GYNECOLOGY

## 2020-08-07 PROCEDURE — 40000791 ZZHCL STATISTIC VERIFI PRENATAL TRISOMY 21,18,13: Mod: 90 | Performed by: OBSTETRICS & GYNECOLOGY

## 2020-08-07 PROCEDURE — 86780 TREPONEMA PALLIDUM: CPT | Performed by: OBSTETRICS & GYNECOLOGY

## 2020-08-07 PROCEDURE — 99207 ZZC PRENATAL VISIT: CPT | Performed by: OBSTETRICS & GYNECOLOGY

## 2020-08-07 PROCEDURE — 99000 SPECIMEN HANDLING OFFICE-LAB: CPT | Performed by: OBSTETRICS & GYNECOLOGY

## 2020-08-07 PROCEDURE — 86900 BLOOD TYPING SEROLOGIC ABO: CPT | Performed by: OBSTETRICS & GYNECOLOGY

## 2020-08-07 PROCEDURE — 87389 HIV-1 AG W/HIV-1&-2 AB AG IA: CPT | Performed by: OBSTETRICS & GYNECOLOGY

## 2020-08-07 NOTE — PROGRESS NOTES
She had third degree laceration and hemorrhage  Discussed potential for primary  section  Discussed the course of last labor  She is considering delivery options  Planning on seeing twila

## 2020-08-08 LAB — T PALLIDUM AB SER QL: NONREACTIVE

## 2020-08-09 LAB
HBV SURFACE AG SERPL QL IA: NONREACTIVE
HIV 1+2 AB+HIV1 P24 AG SERPL QL IA: NONREACTIVE

## 2020-08-10 LAB — RUBV IGG SERPL IA-ACNC: 63 IU/ML

## 2020-08-12 LAB — LAB SCANNED RESULT: NORMAL

## 2020-08-13 ENCOUNTER — TELEPHONE (OUTPATIENT)
Dept: OBGYN | Facility: CLINIC | Age: 25
End: 2020-08-13

## 2020-08-13 NOTE — TELEPHONE ENCOUNTER
Innatal results: Negative  Fetal Fraction: 7 %    TEST RESULT INTERPRETATION   Chromosome 21 No aneuploidy detected  Results consistent with two copies of chromosome 21   Chromosome 18 No aneuploidy detected  Results consistent with two copies of chromosome 18   Chromosome 13 No aneuploidy detected  Results consistent with two copies of chromosome 13   Sex Chromosome No aneuploidy detected  Results consistent with two sex chromosomes:  male     Results received from [employer] testing in [facility] triage.    Testing done:  Innatal Prenatal Screen    Action:  Left detailed vm with negative results. Encouraged pt to call with questions or interested in knowing the gender.    Gender:  Will ask patient if they wish to know the gender.    Lola Mcwilliams RN

## 2020-08-20 ENCOUNTER — MYC MEDICAL ADVICE (OUTPATIENT)
Dept: OBGYN | Facility: CLINIC | Age: 25
End: 2020-08-20

## 2020-08-20 ENCOUNTER — TELEPHONE (OUTPATIENT)
Dept: OBGYN | Facility: CLINIC | Age: 25
End: 2020-08-20

## 2020-08-20 NOTE — TELEPHONE ENCOUNTER
Patient called and Dr. Harrison comments read.  Pt verbalized understanding, in agreement with plan, and voiced no further questions.  Verena Sotelo RN on 8/20/2020 at 3:09 PM

## 2020-08-20 NOTE — TELEPHONE ENCOUNTER
Patient wants to talk to a nurse about her current pregnancy.  Please give her a call back.    Thank you

## 2020-08-20 NOTE — TELEPHONE ENCOUNTER
She would have to have close and immediate contact with the stool itself, and infected stool at that to get toxo. Just getting it from the air is exceedingly unlikely. Only rec for toxo is to not change the litterbox of outdoor cats in the home.   i'm not opposed to testing her but there's really not much reason. Could certainly do it at her next visit if she's going to do an AFP anyway since she did NIPT. If she wants to come in earlier that's fine but likely very low yield

## 2020-09-04 ENCOUNTER — PRENATAL OFFICE VISIT (OUTPATIENT)
Dept: OBGYN | Facility: CLINIC | Age: 25
End: 2020-09-04
Payer: COMMERCIAL

## 2020-09-04 VITALS — SYSTOLIC BLOOD PRESSURE: 106 MMHG | DIASTOLIC BLOOD PRESSURE: 70 MMHG | WEIGHT: 137 LBS | BODY MASS INDEX: 20.83 KG/M2

## 2020-09-04 DIAGNOSIS — F41.9 ANXIETY DURING PREGNANCY IN SECOND TRIMESTER, ANTEPARTUM: ICD-10-CM

## 2020-09-04 DIAGNOSIS — O09.92 SUPERVISION OF HIGH RISK PREGNANCY IN SECOND TRIMESTER: Primary | ICD-10-CM

## 2020-09-04 DIAGNOSIS — O99.342 ANXIETY DURING PREGNANCY IN SECOND TRIMESTER, ANTEPARTUM: ICD-10-CM

## 2020-09-04 PROCEDURE — 99207 ZZC PRENATAL VISIT: CPT | Performed by: OBSTETRICS & GYNECOLOGY

## 2020-09-04 NOTE — PROGRESS NOTES
Defer flu shot to next time b/c has a PCP that she normally sees for her annual and saw them last year during preg. Explained to patient that usually don't do an annual during preg so can have flu shot here. Wants to wait until next appointment  No FM yet but feeling much bigger and showing more this time.  Had a normal NIPT. Discussed AFP and what it is used for and patient declines for now. Will make sure her anatomy scan is before 20+5 so if any issue can do an AFP after her scan next month  Patient is considered primary c/s given her long labor, long second stage, vacuum, 3rd degree and PPH. Also had large gush of blood at onset of labor and took ambulance to hospital. Healed great after delivery and never had any issues but worries about how traumatizing that was and having incontinence, etc  Spent 20 min discussing her prior experiences and just how different it is with a second baby. Discussed growth U/S at 34 weeks, scheduling IOL at 39 weeks, pros/cons of a c/s now that already had a vaginal delivery and impacts of one or multiple vag deliveries on pelvic floor and incontinence  Patient is feeling much better about an  if do growth U/S at 34 weeks for r/o macrosomia and plan EIOL at 39wks as well  Return 4 weeks and get anatomy scan

## 2020-09-06 ENCOUNTER — NURSE TRIAGE (OUTPATIENT)
Dept: NURSING | Facility: CLINIC | Age: 25
End: 2020-09-06

## 2020-09-06 NOTE — TELEPHONE ENCOUNTER
"Patient is pregnant, and was stung by a  wasp 1 hr ago. 1st sting. Family hx of allergic reaction. She was stung on the right ankle. It has a bump and a little redness around it. Painful=\"annoying, mild stinging pain\". Requesting home care advice.   Triaged to a disposition of Home Care: given per protocol.     Additional Information    Negative: [1] Life-threatening reaction (anaphylaxis) in the past to sting AND [2] < 2 hours since sting    Negative: Attacked by swarm of bees now    Negative: Passed out (i.e., lost consciousness, collapsed and was not responding)    Negative: Wheezing, stridor, or difficulty breathing    Negative: [1] Hoarseness or cough AND [2] sudden onset following sting    Negative: [1] Tightness in the throat or chest AND [2] sudden onset following sting    Negative: [1] Swollen tongue or difficulty swallowing AND [2] sudden onset following sting    Negative: Sounds like a life-threatening emergency to the triager    Negative: Not a bee, wasp, hornet, or yellow jacket sting    Negative: Ring stuck on swollen finger (or toe) following bee sting    Negative: [1] Widespread hives, itching or facial swelling AND [2] started within 2 hours of sting (Exception: only at site of sting)    Negative: [1] Vomiting or abdominal cramps AND [2] started within 2 hours of sting    Negative: [1] Gave epinephrine shot AND [2] no symptoms now    Negative: Sting inside the mouth    Negative: Sting on eyeball (e.g., cornea)    Negative: Patient sounds very sick or weak to the triager    Negative: More than 50 stings    Negative: [1] Fever AND [2] red area    Negative: [1] Fever AND [2] area is very tender to touch    Negative: [1] Red streak or red line AND [2] length > 2 inches (5 cm)    Negative: [1] Red or very tender (to touch) area AND [2] started over 24 hours after the sting    Negative: [1] Red or very tender (to touch) area AND [2] getting larger over 48 hours after the sting    Negative: Swelling is " huge (e.g., > 4 inches or 10 cm, spreads beyond wrist or ankle)    Negative: [1] Widespread hives, itching or facial swelling AND [2] started over 2 hours after sting  (Exception: only at site of sting)    Negative: [1] Scab is present AND [2] it drains pus or increases in size AND [3] not improved after applying  antibiotic ointment for 2 days    Normal local reaction to bee, wasp, or yellow jacket sting    Protocols used: BEE OR YELLOW JACKET STING-A-AH  COVID 19 Nurse Triage Plan/Patient Instructions    Please be aware that novel coronavirus (COVID-19) may be circulating in the community. If you develop symptoms such as fever, cough, or SOB or if you have concerns about the presence of another infection including coronavirus (COVID-19), please contact your health care provider or visit [email or web address].     Disposition/Instructions    Home care recommended. Follow home care protocol based instructions.    Thank you for taking steps to prevent the spread of this virus.  o Limit your contact with others.  o Wear a simple mask to cover your cough.  o Wash your hands well and often.    Resources    [facility]: About COVID-19: [email or web address]    CDC: What to Do If You're Sick: www.cdc.gov/coronavirus/2019-ncov/about/steps-when-sick.html    CDC: Ending Home Isolation: www.cdc.gov/coronavirus/2019-ncov/hcp/disposition-in-home-patients.html     CDC: Caring for Someone: www.cdc.gov/coronavirus/2019-ncov/if-you-are-sick/care-for-someone.html     [facility]: Interim Guidance for Hospital Discharge to Home: www.health.[facility].[place]/diseases/coronavirus/hcp/hospdischarge.pdf    [facility] clinical trials (COVID-19 research studies): clinicalaffairs.[email or web address]/umn-clinical-trials     Below are the COVID-19 hotlines at the [place] Department of Health ([facility]). Interpreters are available.   o For health questions: Call 568-812-0980 or 1-557.547.3783 (7 a.m. to 7 p.m.)  o For questions about schools and  childcare: Call 486-976-9111 or 1-115.443.5999 (7 a.m. to 7 p.m.)

## 2020-09-21 ENCOUNTER — TELEPHONE (OUTPATIENT)
Dept: OBGYN | Facility: CLINIC | Age: 25
End: 2020-09-21

## 2020-09-21 NOTE — TELEPHONE ENCOUNTER
Past couple of days feeling tightening in the front and pelvic pressure.   Even when she lays down and relax does not improve  When puts hand below belly button stomach feels tight  No s/s of UTI  Last hour has felt 5 or 6 times-not regular-intermittent   Consulted with Dr. Harrison-ok to monitor for now, Tylenol for comfort, can do benadryl or Tylenol PM at bedtime, warm bath, rest when able during the day. Call if regular and/or painful. Pt informed, agrees with recommendations  Carmen Edward RN on 9/21/2020 at 12:48 PM

## 2020-09-21 NOTE — TELEPHONE ENCOUNTER
Pt called wanting to speak to a nurse about symptoms. Per pt every time she picks up her toddler she experiences trina proctor contractions and is wondering what/if there is anything she can do? Please advise.

## 2020-09-24 ENCOUNTER — TELEPHONE (OUTPATIENT)
Dept: OBGYN | Facility: CLINIC | Age: 25
End: 2020-09-24

## 2020-09-24 NOTE — TELEPHONE ENCOUNTER
[facility] from [facility] called to let Dr. Harrison know that she is working with this patient.   Phone number provided: [phone]

## 2020-09-26 ENCOUNTER — NURSE TRIAGE (OUTPATIENT)
Dept: NURSING | Facility: CLINIC | Age: 25
End: 2020-09-26

## 2020-09-26 NOTE — TELEPHONE ENCOUNTER
"Pt calls in   Is 19 weeks pregnant  Today had 1 episode of spotting - when she wiped  Also C/O mild abdominal discomfort   Says has been having [place] proctor contractions     Via protocol advised pt - 1 episode of spotting can treat at home and to monitor    Pt says this is the third time she has called for \" issues\" and keeps getting the \" run around\" and \"'would like to talk to a MD\"    Paging  for [facility] called  Had Dr JESUSITA Hooker call me back directly   Discussed pt - Dr Hooker will now give pt a call directly at [phone] to discuss    Protocol and care advice reviewed  Caller states understanding of the recommended disposition  Advised to call back if further questions or concerns    Gokul Short RN / [facility]    Additional Information    [1] Vaginal bleeding AND [2] pregnant < 20 weeks    SPOTTING (single or brief episode)    Negative: Shock suspected (e.g., cold/pale/clammy skin, too weak to stand, low BP, rapid pulse)    Negative: Difficult to awaken or acting confused (e.g., disoriented, slurred speech)    Negative: Passed out (i.e., lost consciousness, collapsed and was not responding)    Negative: Sounds like a life-threatening emergency to the triager    Negative: SEVERE abdominal pain    Negative: [1] SEVERE vaginal bleeding (i.e., soaking 2 pads / hour, large blood clots) AND [2] present 2 or more hours    Negative: SEVERE dizziness (e.g., unable to stand, requires support to walk, feels like passing out)    Negative: [1] MODERATE vaginal bleeding (i.e., soaking 1 pad / hour; clots) AND [2] present > 6 hours    Negative: [1] MODERATE vaginal bleeding (i.e., soaking 1 pad / hour; clots) AND [2] pregnant > 12 weeks    Negative: Passed tissue (e.g., gray-white)    Negative: Shoulder pain    Negative: Pale skin (pallor) of new onset or worsening    Negative: Patient sounds very sick or weak to the triager    Negative: [1] Constant abdominal pain AND [2] present > " "2 hours    Negative: Fever > 100.4 F (38.0 C)    Negative: [1] Intermittent lower abdominal pain (e.g., cramping) AND [2] present > 24 hours    Negative: Prior history of \"ectopic pregnancy\" or previous tubal surgery (e.g., tubal ligation)    Negative: Pain or burning with passing urine (urination)    Negative: MODERATE vaginal bleeding (i.e., soaking 1 pad / hour; clots)    Negative: Has IUD    Negative: MILD vaginal bleeding (i.e., less than 1 pad / hour; less than patient's usual menstrual bleeding; not just spotting)    Negative: SPOTTING lasts > 48 hours or spotting happens more than once in a week    Negative: Unusual vaginal discharge (e.g., bad smelling, yellow, green, or foamy-white)    Negative: Not feeling pregnant any longer (e.g., breast tenderness or nausea has disappeared)    Negative: SPOTTING after sexual intercourse (single or brief episode)    Negative: SPOTTING after a pelvic examination (single or brief episode)    Protocols used: PREGNANCY - VAGINAL [id number], PREGNANCY - VAGINAL BLEEDING LESS THAN 20 WEEKS EGA-A-AH      "

## 2020-09-27 ENCOUNTER — NURSE TRIAGE (OUTPATIENT)
Dept: NURSING | Facility: CLINIC | Age: 25
End: 2020-09-27

## 2020-09-27 NOTE — TELEPHONE ENCOUNTER
Patient says is pregnant and started spotting yesterday morning.  Patient reports spotting is more of a reddish color now.  Patient report some cramping (3-4/10) since last night about midnight.  Current temp is 98.5 F.  Patient complaining people are not taking her seriously or blowing her off.  Reviewed care advice with caller per RN triage protocol guideline to be seen within 3 days.  Caller verbalized understanding.  Patient has an appointment tomorrow w/ Dr. Harrison tomorrow but wants more to be done for her.  FNA advised she can go to the ED to be assessed if she wants faster evaluatation.  Patient declined and says she does not want to catch COVID so it would be a stupid idea to go there.      Additional Information    Negative: Shock suspected (e.g., cold/pale/clammy skin, too weak to stand, low BP, rapid pulse)    Negative: Difficult to awaken or acting confused (e.g., disoriented, slurred speech)    Negative: Passed out (i.e., lost consciousness, collapsed and was not responding)    Negative: Sounds like a life-threatening emergency to the triager    Negative: SEVERE abdominal pain    Negative: [1] SEVERE vaginal bleeding (i.e., soaking 2 pads / hour, large blood clots) AND [2] present 2 or more hours    Negative: SEVERE dizziness (e.g., unable to stand, requires support to walk, feels like passing out)    Negative: [1] MODERATE vaginal bleeding (i.e., soaking 1 pad / hour; clots) AND [2] present > 6 hours    Negative: [1] MODERATE vaginal bleeding (i.e., soaking 1 pad / hour; clots) AND [2] pregnant > 12 weeks    Negative: Passed tissue (e.g., gray-white)    Negative: Shoulder pain    Negative: Pale skin (pallor) of new onset or worsening    Negative: Patient sounds very sick or weak to the triager    Negative: [1] Constant abdominal pain AND [2] present > 2 hours    Negative: Fever > 100.4 F (38.0 C)    Negative: [1] Intermittent lower abdominal pain (e.g., cramping) AND [2] present > 24 hours     "Negative: Prior history of \"ectopic pregnancy\" or previous tubal surgery (e.g., tubal ligation)    Negative: Pain or burning with passing urine (urination)    Negative: MODERATE vaginal bleeding (i.e., soaking 1 pad / hour; clots)    Negative: Has IUD    Negative: MILD vaginal bleeding (i.e., less than 1 pad / hour; less than patient's usual menstrual bleeding; not just spotting)    Negative: SPOTTING lasts > 48 hours or spotting happens more than once in a week    Unusual vaginal discharge (e.g., bad smelling, yellow, green, or foamy-white)    Protocols used: PREGNANCY - VAGINAL BLEEDING LESS THAN 20 WEEKS EGA-A-      "

## 2020-09-28 ENCOUNTER — ANCILLARY PROCEDURE (OUTPATIENT)
Dept: ULTRASOUND IMAGING | Facility: CLINIC | Age: 25
End: 2020-09-28
Attending: OBSTETRICS & GYNECOLOGY
Payer: COMMERCIAL

## 2020-09-28 ENCOUNTER — PRENATAL OFFICE VISIT (OUTPATIENT)
Dept: OBGYN | Facility: CLINIC | Age: 25
End: 2020-09-28
Attending: OBSTETRICS & GYNECOLOGY
Payer: COMMERCIAL

## 2020-09-28 VITALS — BODY MASS INDEX: 20.53 KG/M2 | DIASTOLIC BLOOD PRESSURE: 72 MMHG | SYSTOLIC BLOOD PRESSURE: 108 MMHG | WEIGHT: 135 LBS

## 2020-09-28 DIAGNOSIS — O26.892 DYSURIA DURING PREGNANCY, ANTEPARTUM, SECOND TRIMESTER: ICD-10-CM

## 2020-09-28 DIAGNOSIS — O09.92 SUPERVISION OF HIGH RISK PREGNANCY IN SECOND TRIMESTER: ICD-10-CM

## 2020-09-28 DIAGNOSIS — R30.0 DYSURIA DURING PREGNANCY, ANTEPARTUM, SECOND TRIMESTER: ICD-10-CM

## 2020-09-28 DIAGNOSIS — N89.8 VAGINAL IRRITATION: ICD-10-CM

## 2020-09-28 DIAGNOSIS — O09.92 SUPERVISION OF HIGH RISK PREGNANCY IN SECOND TRIMESTER: Primary | ICD-10-CM

## 2020-09-28 LAB
ALBUMIN UR-MCNC: NEGATIVE MG/DL
APPEARANCE UR: CLEAR
BILIRUB UR QL STRIP: NEGATIVE
COLOR UR AUTO: YELLOW
GLUCOSE UR STRIP-MCNC: NEGATIVE MG/DL
HGB UR QL STRIP: NEGATIVE
KETONES UR STRIP-MCNC: NEGATIVE MG/DL
LEUKOCYTE ESTERASE UR QL STRIP: NEGATIVE
NITRATE UR QL: NEGATIVE
NON-SQ EPI CELLS #/AREA URNS LPF: NORMAL /LPF
PH UR STRIP: 7 PH (ref 5–7)
RBC #/AREA URNS AUTO: NORMAL /HPF
SOURCE: NORMAL
SP GR UR STRIP: 1.01 (ref 1–1.03)
UROBILINOGEN UR STRIP-ACNC: 0.2 EU/DL (ref 0.2–1)
WBC #/AREA URNS AUTO: NORMAL /HPF

## 2020-09-28 PROCEDURE — 99213 OFFICE O/P EST LOW 20 MIN: CPT | Performed by: OBSTETRICS & GYNECOLOGY

## 2020-09-28 PROCEDURE — 87205 SMEAR GRAM STAIN: CPT | Performed by: OBSTETRICS & GYNECOLOGY

## 2020-09-28 PROCEDURE — 81001 URINALYSIS AUTO W/SCOPE: CPT | Performed by: OBSTETRICS & GYNECOLOGY

## 2020-09-28 PROCEDURE — 99207 ZZC PRENATAL VISIT: CPT | Performed by: OBSTETRICS & GYNECOLOGY

## 2020-09-28 PROCEDURE — 76805 OB US >/= 14 WKS SNGL FETUS: CPT | Performed by: OBSTETRICS & GYNECOLOGY

## 2020-09-28 NOTE — PROGRESS NOTES
Normal anatomy U/S and posterior placenta that is low lying at 1.1 cm from cervix. Reviewed what this means and that almost certainly will resolve. Will rechk at 28 weeks at time of a growth scan. Since doing that at 28 will then bump out her last growth U/S to 36 weeks and not 34 weeks. Feeling much better about EIOL and not a c/s  There is a stable 3.5cm simple cyst on the left that is asx  Patient has had 2 episodes of bleeding this preg. One at 5 weeks and one this weekend. Not s.a since the episode at 5 weeks which did happen right after sex. No constipation, no heavy lifting or exercise other than carrying her 30# son around.  Called this weekend about the spotting and asked about urinary/vaginal sx and didn't have any. However today states she is having irritation and burning and stinging at the skin where urine comes out. Some thick white d/c. Not itching per se and not sx outside of when voiding  UA done and completely negative  On exam the labia minora are slightly erythematous, heavier milky discharge seen on vaginal tissue at introitus  On sse there is moderate milky/creamy white d/c that seems physiologic and not like yeast. The cervix clearly has some friable areas/blood vessels at least anteriorly that could very well account for her bleeding. No need to worry though and no need to have restrictions but at least has an explanation for it  UA sent and completely clear so no UCx sent  Gram stain sent as well and if positive will treat but o/w is likely just having some chaffing from d/c and urine leakage. Encouraged vaseline/aquaphor and a poise pad to help decrease that  Return 4 weeks

## 2020-09-29 LAB
GRAM STN SPEC: NORMAL
SPECIMEN SOURCE: NORMAL

## 2020-10-01 ENCOUNTER — TELEPHONE (OUTPATIENT)
Dept: OBGYN | Facility: CLINIC | Age: 25
End: 2020-10-01

## 2020-10-01 ENCOUNTER — ALLIED HEALTH/NURSE VISIT (OUTPATIENT)
Dept: NURSING | Facility: CLINIC | Age: 25
End: 2020-10-01
Payer: COMMERCIAL

## 2020-10-01 DIAGNOSIS — Z34.01 ENCOUNTER FOR SUPERVISION OF NORMAL FIRST PREGNANCY IN FIRST TRIMESTER: Primary | ICD-10-CM

## 2020-10-01 PROCEDURE — 99207 PR NO CHARGE NURSE ONLY: CPT
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
## 2020-10-01 NOTE — TELEPHONE ENCOUNTER
You did the best you could do. This patient has a high amount of anxiety. Clinic and the hospital are available for evaluation for her.

## 2020-10-01 NOTE — TELEPHONE ENCOUNTER
"Pt returned call and stated the call dropped and she had to deal with her toddler.  She would like to come in for a fetal heart tone check for reassurance and can be here in 15\". Placed on schedule.    Lola Mcwilliams RN on 10/1/2020 at 4:25 PM    "

## 2020-10-03 ENCOUNTER — MYC MEDICAL ADVICE (OUTPATIENT)
Dept: OBGYN | Facility: CLINIC | Age: 25
End: 2020-10-03

## 2020-10-03 DIAGNOSIS — Z20.7 EXPOSURE TO TOXOPLASMA SPECIES: Primary | ICD-10-CM

## 2020-10-05 NOTE — TELEPHONE ENCOUNTER
Routing pt Team[employer]orthart message to provider to advise.    Lola Mcwilliams RN on 10/5/2020 at 10:02 AM

## 2020-10-05 NOTE — TELEPHONE ENCOUNTER
Yes, that's fine though as she said it's fairly rare for a mostly indoor cat. i'll put in the order.

## 2020-10-06 ENCOUNTER — NURSE TRIAGE (OUTPATIENT)
Dept: NURSING | Facility: CLINIC | Age: 25
End: 2020-10-06

## 2020-10-07 ENCOUNTER — TELEPHONE (OUTPATIENT)
Dept: OBGYN | Facility: CLINIC | Age: 25
End: 2020-10-07

## 2020-10-07 ENCOUNTER — HOSPITAL ENCOUNTER (OUTPATIENT)
Facility: CLINIC | Age: 25
Discharge: HOME | End: 2020-10-07
Attending: OBSTETRICS & GYNECOLOGY | Admitting: OBSTETRICS & GYNECOLOGY
Payer: COMMERCIAL

## 2020-10-07 ENCOUNTER — NURSE TRIAGE (OUTPATIENT)
Dept: NURSING | Facility: CLINIC | Age: 25
End: 2020-10-07

## 2020-10-07 VITALS — DIASTOLIC BLOOD PRESSURE: 51 MMHG | RESPIRATION RATE: 16 BRPM | TEMPERATURE: 98.1 F | SYSTOLIC BLOOD PRESSURE: 101 MMHG

## 2020-10-07 DIAGNOSIS — O46.92 VAGINAL BLEEDING IN PREGNANCY, SECOND TRIMESTER: Primary | ICD-10-CM

## 2020-10-07 LAB
ERYTHROCYTE [DISTWIDTH] IN BLOOD BY AUTOMATED COUNT: 14 % (ref 10–15)
HCT VFR BLD AUTO: 38.7 % (ref 35–47)
HGB BLD-MCNC: 13 G/DL (ref 11.7–15.7)
MCH RBC QN AUTO: 29.6 PG (ref 26.5–33)
MCHC RBC AUTO-ENTMCNC: 33.6 G/DL (ref 31.5–36.5)
MCV RBC AUTO: 88 FL (ref 78–100)
PLATELET # BLD AUTO: 210 10E9/L (ref 150–450)
RBC # BLD AUTO: 4.39 10E12/L (ref 3.8–5.2)
WBC # BLD AUTO: 10.6 10E9/L (ref 4–11)

## 2020-10-07 PROCEDURE — 85027 COMPLETE CBC AUTOMATED: CPT | Performed by: OBSTETRICS & GYNECOLOGY

## 2020-10-07 PROCEDURE — 36415 COLL VENOUS BLD VENIPUNCTURE: CPT | Performed by: OBSTETRICS & GYNECOLOGY

## 2020-10-07 PROCEDURE — G0463 HOSPITAL OUTPT CLINIC VISIT: HCPCS

## 2020-10-07 RX ORDER — ONDANSETRON 2 MG/ML
4 INJECTION INTRAMUSCULAR; INTRAVENOUS EVERY 6 HOURS PRN
Status: DISCONTINUED | OUTPATIENT
Start: 2020-10-07 | End: 2020-10-07 | Stop reason: HOSPADM

## 2020-10-07 NOTE — PLAN OF CARE
RN discusses plan with patient to discharge home and reassures patient of overall improvement in symptoms since arrival. RN advises patient to be off of feet for today, and to arrange other care if at all possible for other child, as directed by provider. Vaginal bleeding and contraction precautions reviewed and also when to call provider if bleeding/tightening/cramping should worsen despite best efforts at home to stop (which include rest, oral hydration, and emptying bladder).    Patient verbalizes understanding and is discharged home, ambulatory at 0645.

## 2020-10-07 NOTE — PROVIDER NOTIFICATION
10/07/20 0618   Provider Notification   Provider Name/Title Dr Barber   Method of Notification Electronic Page;Phone   Request Evaluate - Remote   Notification Reason Status Update   Provider updated that patient has had pink and brown tinged spotting to toilet paper with trips to bathroom. Improved overall since arrival to [facility], with decrease in tightening and cramping and also vaginal bleeding. Per provider to discharge patient home and to instruct patient to be off of her feet throughout the day today and advises patient not to be primary care provider for other child at home if at all possible. Discharge order received and entered by RN.

## 2020-10-07 NOTE — TELEPHONE ENCOUNTER
In [facility] last night for spotting which has been going on for approx 2 weeks and cramping  Feeling better today, resting, staying well hydrated  Was told by RN to call today for f/u instructions.  Next 5 appointments (look out 90 days)    Oct 30, 2020 11:30 AM  ESTABLISHED PRENATAL with Hannah Harrison MD  [facility] [place] ([facility] [place]) [address] [place] [id number]  [phone]   Nov 25, 2020  9:30 AM  ESTABLISHED PRENATAL with Hannah Harrison MD  [facility] ([facility]) [address] [place] [id number]  [phone]        1. Want anything sooner than this for appointments?  2. Can she go back to normal activity-any restrictions    Carmen Edward RN on 10/7/2020 at 2:10 PM

## 2020-10-07 NOTE — PLAN OF CARE
Pt sleeping when RN enters room. States has not been feeling any tightening or cramping. With recent restroom use pt reports minimal brown discharge with pink tinge on toilet paper when wiping. VSS, FHR checked with doppler again, 155bpm noted. Plan to call provider for updated plan of care.

## 2020-10-07 NOTE — DISCHARGE INSTRUCTIONS
Discharge Instruction for Undelivered Patients      You were seen for: Bleeding Assessment  We Consulted: Dr Barber  You had (Test or Medicine):Doppler assessment of fetal heart tones, continuous contraction monitoring, and CBC laboratory test     Diet:   Drink 8 to 12 glasses of liquids (milk, juice, water) every day.  You may eat meals and snacks.     Activity:  Stay on bed rest or partial bed rest. This means to stay off your feet and to not be primary care provider if at all possible for other child today.     Call your provider if you notice:  Swelling in your face or increased swelling in your hands or legs.  Headaches that are not relieved by Tylenol (acetaminophen).  Changes in your vision (blurring: seeing spots or stars.)  Nausea (sick to your stomach) and vomiting (throwing up).   Weight gain of 5 pounds or more per week.  Heartburn that doesn't go away.  Signs of bladder infection: pain when you urinate (use the toilet), need to go more often and more urgently.  The bag of diaz (rupture of membranes) breaks, or you notice leaking in your underwear.  Bright red blood in your underwear.  Abdominal (lower belly) or stomach pain.  Second (plus) baby: Contractions (tightening) less than 10 minutes apart and getting stronger.  *If less than 34 weeks: Contractions (tightenings) more than 6 times in one hour.  Increase or change in vaginal discharge (note the color and amount)      Follow-up:  As scheduled in the clinic

## 2020-10-07 NOTE — PLAN OF CARE
"Pt to restroom, voids, states she saw same brown and some pink tinge to toilet paper when wiping. Denies having any clotting. Pt states the cramping \"is much less\" and the tightening has remained the same. Will continue to monitor.  "

## 2020-10-07 NOTE — TELEPHONE ENCOUNTER
On her exam on 9/28 she had some friable areas on her cervix and I felt that her bleeding was related to taht, and likely still is. Not much can be done about that b/c silver nitrate during pregnancy will fix the blood vessels but just make her cramp more    I don't see if they checked her cervix or did an FFN when she was in but an FFN can be false pos when bleeding anway so better to check it when she's not    Let's see if we can get her in for a limited U/S to check her placenta as it was low lying and then an exam with me in the next week or so and I can repeat exam and see if has any dilation or if it's just those blood vessels. If the latter than this may happen throughout pregnancy and needs to be careful about heavy lifting, straining, prolonged standing, etc.

## 2020-10-07 NOTE — PLAN OF CARE
Patient to restroom, voids, shows RN that when she wiped she had small quarter-sized spot of pink/brown blood with small dime sized dark red clot. No new bleeding since arriving to MAC on pad in underwear. Will continue to monitor.

## 2020-10-07 NOTE — PROVIDER NOTIFICATION
10/07/20 0335   Provider Notification   Provider Name/Title Dr Barber   Method of Notification Electronic Page;Phone   Request Evaluate - Remote   Notification Reason Patient Arrived;Lab/Diagnostic Study;Bleeding;Status Update   Provider updated regarding patient arrival to unit. Discussed with provider: FHR of 155 via doppler, uterine irritability noted on monitor, patient reports frequent tightening with intermittent uterine cramping. No bleeding seen on pad upon patient arrival. CBC results discussed. Provider aware that patient is currently orally hydrating. Per provider to continue to monitor patient/TOCO/bleeding for 2 additional hours. Encourage oral hydration. Alert provider with increase of bleeding or increased pain with uterine irritability. Will call for further plan of care after 2 hours of monitoring or if patient condition warrants sooner.

## 2020-10-07 NOTE — TELEPHONE ENCOUNTER
20w6d  [facility]  Dr. Harrison    Heavier bleeding  -Bright red blood  -spoke with MD and he said to rest and hopefully it would taper off    Bleeding like she has her period   -no blood in underwear  -blood in toilet and when she wipes  -earlier had clots  -since laying down, it is only noticeable when she gets up to go to the bathroom    Now has been trying to rest, but it is getting worse when she gets up    Mild cramps started an hour ago  Rates them 2-3/10     Slightly shaky     No dizziness/lightheadedness  No fever  No other symptoms    States she is NOT comfortable with the amount of blood loss. States she will wait for provider return call but may end up just going in regardless. Advised that if this is the case, she could just go in now, patient would like to wait to see if the advice has changed.     Paged Dr. Jose Barber to call patient to discuss.    COVID 19 Nurse Triage Plan/Patient Instructions    Please be aware that novel coronavirus (COVID-19) may be circulating in the community. If you develop symptoms such as fever, cough, or SOB or if you have concerns about the presence of another infection including coronavirus (COVID-19), please contact your health care provider or visit [email or web address].     Disposition/Instructions    Virtual Visit with provider recommended. Reference Visit Selection Guide.    Thank you for taking steps to prevent the spread of this virus.  o Limit your contact with others.  o Wear a simple mask to cover your cough.  o Wash your hands well and often.    Resources    [facility]: About COVID-19: [email or web address]    CDC: What to Do If You're Sick: www.cdc.gov/coronavirus/2019-ncov/about/steps-when-sick.html    CDC: Ending Home Isolation: www.cdc.gov/coronavirus/2019-ncov/hcp/disposition-in-home-patients.html     CDC: Caring for Someone: www.cdc.gov/coronavirus/2019-ncov/if-you-are-sick/care-for-someone.html     [facility]: Interim Guidance for Hospital  Discharge to Home: www.health.[facility].[place]/diseases/coronavirus/hcp/hospdischarge.pdf    [facility] clinical trials (COVID-19 research studies): [email or web address].[email or web address]/n-clinical-trials     Below are the COVID-19 hotlines at the [place] Department of [facility] ([facility]). Interpreters are available.   o For health questions: Call 414-509-8216 or 1-864.402.6054 (7 a.m. to 7 p.m.)  o For questions about schools and childcare: Call 239-502-0065 or 1-177.990.6342 (7 a.m. to 7 p.m.)     Additional Information    Negative: Passed out (i.e., lost consciousness, collapsed and was not responding)    Negative: Shock suspected (e.g., cold/pale/clammy skin, too weak to stand, low BP, rapid pulse)    Negative: Difficult to awaken or acting confused (e.g., disoriented, slurred speech)    Negative: SEVERE vaginal bleeding (e.g., continuous red blood from vagina, large blood clots)    Negative: [1] SEVERE abdominal pain (e.g., excruciating) AND [2] constant AND [3] present > 1 hour    Negative: Sounds like a life-threatening emergency to the triager    Negative: [1] Vaginal bleeding AND [2] pregnant < 20 weeks    Protocols used: PREGNANCY - VAGINAL BLEEDING GREATER THAN 20 WEEKS EGA-A-AH    Blanca Zarate RN on 10/7/2020 at 2:07 AM

## 2020-10-07 NOTE — TELEPHONE ENCOUNTER
Patient calling wanting to either speak with Dr Harrison or consult through [facility] triage regarding visit to MAC last night.

## 2020-10-07 NOTE — TELEPHONE ENCOUNTER
Pt called in states she has vaginal bleeding.  The bleeding started 30 min ago.  The bleeding looks clots is medium size.  No abdominal pain.  No dizziness.  No fever.  Pt request to page on call   On call provider was paged, no other concern at this time.      Michael Borrero Nurse Advisor 10/6/2020 10:28 PM        Reason for Disposition    MODERATE vaginal bleeding (i.e., soaking 1 pad / hour; clots)    Additional Information    Negative: Shock suspected (e.g., cold/pale/clammy skin, too weak to stand, low BP, rapid pulse)    Negative: Difficult to awaken or acting confused (e.g., disoriented, slurred speech)    Negative: Passed out (i.e., lost consciousness, collapsed and was not responding)    Negative: Sounds like a life-threatening emergency to the triager    Negative: [1] Vaginal bleeding AND [2] pregnant > 20 weeks    Negative: Not pregnant or pregnancy status unknown    Negative: SEVERE abdominal pain    Negative: [1] SEVERE vaginal bleeding (i.e., soaking 2 pads / hour, large blood clots) AND [2] present 2 or more hours    Negative: SEVERE dizziness (e.g., unable to stand, requires support to walk, feels like passing out)    Negative: [1] MODERATE vaginal bleeding (i.e., soaking 1 pad / hour; clots) AND [2] present > 6 hours    Negative: [1] MODERATE vaginal bleeding (i.e., soaking 1 pad / hour; clots) AND [2] pregnant > 12 weeks    Negative: Passed tissue (e.g., gray-white)    Negative: Shoulder pain    Protocols used: PREGNANCY - VAGINAL BLEEDING LESS THAN 20 WEEKS [facility]-A-

## 2020-10-08 NOTE — TELEPHONE ENCOUNTER
Pt informed and scheduled for f/u US and visit 10/14/20    Lola Mcwilliams RN on 10/8/2020 at 9:29 AM

## 2020-10-09 ENCOUNTER — MYC MEDICAL ADVICE (OUTPATIENT)
Dept: OBGYN | Facility: CLINIC | Age: 25
End: 2020-10-09

## 2020-10-09 NOTE — TELEPHONE ENCOUNTER
"Returned pt call    Not painful contractions but \"strong\". She has to lay down and rest for them to go away/space out and they return immediately when she stands back up again.  Over 100 oz of water consumed daily since left hospital  Lots of pelvic pressure - in butt and vagina  NO further bleeding or clots but experiencing pink/light brown discharge which isn't her main concern but the pressure is so intense.    Consulted with Dr Harrison with above info.    2nd baby common to feel contractions and pelvic pressure martha when standing/active. She needs to rest when the contractions occur - she is reassured the contractions and pressure relieved when laying down. Does not need to be on medical bedrest but needs to back off on activity when contractions - don't be the primary care giver, avoid lifting child, rest and hydrate. Monitor at home and call if contractions continue with rest/hydration or any bright red bleeding.    Pt instructed and reassured. She will take it easy and call with concerns/changes.  Has apt 10/14/20 and US    Pt verbalized understanding, in agreement with plan, and voiced no further questions.  Lola Mcwilliams RN on 10/9/2020 at 3:12 PM    "

## 2020-10-09 NOTE — TELEPHONE ENCOUNTER
Routing pt mychart message to provider to advise.    Pt also called and very concerned.    Send to [facility]?      Lola Mcwilliams RN on 10/9/2020 at 2:38 PM

## 2020-10-14 ENCOUNTER — PRENATAL OFFICE VISIT (OUTPATIENT)
Dept: OBGYN | Facility: CLINIC | Age: 25
End: 2020-10-14
Payer: COMMERCIAL

## 2020-10-14 ENCOUNTER — TRANSFERRED RECORDS (OUTPATIENT)
Dept: HEALTH INFORMATION MANAGEMENT | Facility: CLINIC | Age: 25
End: 2020-10-14

## 2020-10-14 VITALS — DIASTOLIC BLOOD PRESSURE: 66 MMHG | BODY MASS INDEX: 21.13 KG/M2 | SYSTOLIC BLOOD PRESSURE: 118 MMHG | WEIGHT: 139 LBS

## 2020-10-14 DIAGNOSIS — O46.92 VAGINAL BLEEDING IN PREGNANCY, SECOND TRIMESTER: ICD-10-CM

## 2020-10-14 DIAGNOSIS — O44.02 PLACENTA PREVIA IN SECOND TRIMESTER: ICD-10-CM

## 2020-10-14 DIAGNOSIS — O09.92 SUPERVISION OF HIGH RISK PREGNANCY IN SECOND TRIMESTER: Primary | ICD-10-CM

## 2020-10-14 DIAGNOSIS — R39.9 UTI SYMPTOMS: ICD-10-CM

## 2020-10-14 PROCEDURE — 99213 OFFICE O/P EST LOW 20 MIN: CPT | Performed by: OBSTETRICS & GYNECOLOGY

## 2020-10-14 PROCEDURE — 81003 URINALYSIS AUTO W/O SCOPE: CPT | Performed by: OBSTETRICS & GYNECOLOGY

## 2020-10-14 PROCEDURE — 87086 URINE CULTURE/COLONY COUNT: CPT | Performed by: OBSTETRICS & GYNECOLOGY

## 2020-10-16 LAB
BACTERIA SPEC CULT: NORMAL
SPECIMEN SOURCE: NORMAL

## 2020-10-17 NOTE — PROGRESS NOTES
Patient has been having bleeding for over a week. Had 2 prior episodes. One really early at 5 weeks and then one at 19 weeks. The one at 5 weeks was postcoital and spont. Resolved quickly. The one at 19 was not triggered that she is aware of. Has not been sexually active since 5 weeks. Having regular daily BMs that aren't constipated or that hard and not needing to strain  Then about a week after her last appointment with normal anatomy scan and a low lying placenta, but 1.1cm away and not previa, the patient began to have vaginal bleeding again. The day she went to [facility] she actually was passing small clots into the toilet and then had fresh flowing bright red blood. Would never get any on a pad in between trips to the bathroom but when went to bathroom would wipe and have bright red blood. Went to [facility]. fhts were normal. Had a swab done w/o a speculum for something but then states it wasn't sent. Unclear if an FFN and then realized early GA or a ROM+ or what. No labs in system to show it. Had had a gram stain 9/28 at her visit with me but speculum guided and it was negative.  Since then feels like she has some pain and burning at the end of urine stream. Not a typical UTI but not like a vaginal infection either.  Will have her leave ua/ucx today  She bled until 10/10 though it was starting to turn dark red to brown and lighten and then on 10/10 it finally stopped  Had an U/S today that was unfortuntaly not done at our office but rather sub rads  According to the tech worksheet the placenta was called a previa or at the very least a marginal previa. Had been called LL and 1.1cm away on our scan a couple weeks ago  No evidence for abruption, good fhts, normal viable moving early IUP. Patient feels a lot of flutters and moving as well  She also is having a lot of cramping and BH that she didn't have with her first pregnancy  Since bleeding just stopped 4 days ago and already had a TV U/S today no repeat exam was done so  as not to exacerbate bleeding  Now some question on whether there is a previa or not so until her next routine appointment in 2 weeks, with an U/S in our office, to confirm for sure should be on complete pelvic rest and modified bedrest. Should have help with lifting and carrying 30# toddler, not exercise, no prolonged standing or heavy lifting until can reassess if bleeding is from previa or if it's partial abruption or if it's just a blood vessel in her cervix that is bleeding with increased pressure of sitting on the toilet, increased blood volume of preg, etc  When started to labor with her first had a giant gush of bright red blood that actually brought her in so may have a cervical vessel  If has increased bleeding/cramping before her next scheduled visit will call and we will get her in to our U/S scheduled so can more carefully eval if previa or not. May need to consider KB to see if partial abruption is possible even if RH + and rhogam wouldn't be indicated  Patient is comfortable with this plan as long as she knows that if/when she calls we will get her in to our clinic/ultrasound in a reasonable amount of time.  Felt like she waited so long to be seen and like she was talked out of coming in to the hospital when her bleeding was heavy a week ago until she called back through [facility] a second time and then was sent in. Reassured that we will do our best to accommodate her as much as our availability allows but will work hard to fit her in with us and not sub rads if needed for acute indication.    Spent 20 min with the patient, 100% of which was in face to face counseling time related to acute obstetric complication separate from routine ob care

## 2020-10-21 ENCOUNTER — HOSPITAL ENCOUNTER (OUTPATIENT)
Facility: CLINIC | Age: 25
End: 2020-10-21
Admitting: OBSTETRICS & GYNECOLOGY
Payer: COMMERCIAL

## 2020-10-21 ENCOUNTER — HOSPITAL ENCOUNTER (OUTPATIENT)
Facility: CLINIC | Age: 25
Discharge: HOME | End: 2020-10-21
Attending: OBSTETRICS & GYNECOLOGY | Admitting: OBSTETRICS & GYNECOLOGY
Payer: COMMERCIAL

## 2020-10-21 ENCOUNTER — APPOINTMENT (OUTPATIENT)
Dept: ULTRASOUND IMAGING | Facility: CLINIC | Age: 25
End: 2020-10-21
Attending: OBSTETRICS & GYNECOLOGY
Payer: COMMERCIAL

## 2020-10-21 ENCOUNTER — NURSE TRIAGE (OUTPATIENT)
Dept: NURSING | Facility: CLINIC | Age: 25
End: 2020-10-21

## 2020-10-21 VITALS — TEMPERATURE: 99 F | SYSTOLIC BLOOD PRESSURE: 125 MMHG | RESPIRATION RATE: 16 BRPM | DIASTOLIC BLOOD PRESSURE: 61 MMHG

## 2020-10-21 PROBLEM — Z36.89 ENCOUNTER FOR TRIAGE IN PREGNANT PATIENT: Status: ACTIVE | Noted: 2020-10-21

## 2020-10-21 LAB — RUPTURE OF FETAL MEMBRANES BY ROM PLUS: NEGATIVE

## 2020-10-21 PROCEDURE — 87070 CULTURE OTHR SPECIMN AEROBIC: CPT | Performed by: OBSTETRICS & GYNECOLOGY

## 2020-10-21 PROCEDURE — 87205 SMEAR GRAM STAIN: CPT | Performed by: OBSTETRICS & GYNECOLOGY

## 2020-10-21 PROCEDURE — G0463 HOSPITAL OUTPT CLINIC VISIT: HCPCS | Mod: 25

## 2020-10-21 PROCEDURE — 84112 EVAL AMNIOTIC FLUID PROTEIN: CPT | Performed by: OBSTETRICS & GYNECOLOGY

## 2020-10-21 PROCEDURE — 76819 FETAL BIOPHYS PROFIL W/O NST: CPT

## 2020-10-21 RX ORDER — ONDANSETRON 2 MG/ML
4 INJECTION INTRAMUSCULAR; INTRAVENOUS EVERY 6 HOURS PRN
Status: DISCONTINUED | OUTPATIENT
Start: 2020-10-21 | End: 2020-10-22 | Stop reason: HOSPADM

## 2020-10-22 LAB
BACTERIA SPEC CULT: NORMAL
BACTERIA SPEC CULT: NORMAL
GRAM STN SPEC: ABNORMAL
Lab: ABNORMAL
SPECIMEN SOURCE: ABNORMAL
SPECIMEN SOURCE: NORMAL

## 2020-10-22 NOTE — TELEPHONE ENCOUNTER
"22w6d  Pt unsure if she is leaking fluid, urine or had discharge.   Pt states she noticed her underwear was wet. Changed underwear, used restroom and went to lay down for 1 hour.  Pt states there is another spot in the fresh underwear.   No consistency or color. Liquid, no consistency. Clear and smells like semen but pt states she has not had sex in months. No urine smell.   +fetal movement   Denies contractions or abdominal pain.  Denies bleeding.     ELVIRA Perry at [facility] recommended RN call on-call provider to discuss.      On-call provider Dr. Harrison paged at 754pm.      Page returned at 755pm  Viability is around 23 weeks at [place].    Patient can go to [facility] as long as she is not in active labor and if it is leakage of fluids she will be transferred out to [place].     Pt notified and stated understanding. Pt will go to [facility] for evaluation.   RN spoke to ELVIRA Perry again and notified of patient's pending arrival. Pt to go to [facility] maternal assessment center     Carolyn Mendoza RN 10/21/20 8:13 PM  [facility] Nurse Advisor      Reason for Disposition    Leakage of fluid from vagina    Additional Information    Negative: [1] SEVERE abdominal pain (e.g., excruciating) AND [2] constant AND [3] present > 1 hour    Negative: Severe bleeding (e.g., continuous red blood from vagina, or large blood clots)    Negative: Umbilical cord hanging out of the vagina (shiny, white, curled appearance, \"like telephone cord\")    Negative: Uncontrollable urge to push (i.e., feels like baby is coming out now)    Negative: Can see baby    Negative: Sounds like a life-threatening emergency to the triager    Negative: < 20 weeks pregnant    Negative: Vaginal bleeding    Protocols used: PREGNANCY - RUPTURE OF [id number]      "

## 2020-10-22 NOTE — DISCHARGE INSTRUCTIONS
Discharge Instruction for Undelivered Patients      You were seen for: Membrane Assessment  We Consulted: Dr. JADE Harrison  You had (Test or Medicine): ROM+, gram stain     Diet:   Drink 8 to 12 glasses of liquids (milk, juice, water) every day.  You may eat meals and snacks.     Activity:  Call your doctor or nurse midwife if your baby is moving less than usual.     Call your provider if you notice:  Swelling in your face or increased swelling in your hands or legs.  Headaches that are not relieved by Tylenol (acetaminophen).  Changes in your vision (blurring: seeing spots or stars.)  Nausea (sick to your stomach) and vomiting (throwing up).   Weight gain of 5 pounds or more per week.  Heartburn that doesn't go away.  Signs of bladder infection: pain when you urinate (use the toilet), need to go more often and more urgently.  The bag of diaz (rupture of membranes) breaks, or you notice leaking in your underwear.  Bright red blood in your underwear.  Abdominal (lower belly) or stomach pain.  Second (plus) baby: Contractions (tightening) less than 10 minutes apart and getting stronger.  *If less than 34 weeks: Contractions (tightenings) more than 6 times in one hour.  Increase or change in vaginal discharge (note the color and amount)      Follow-up:  As scheduled in the clinic

## 2020-10-28 ENCOUNTER — PRENATAL OFFICE VISIT (OUTPATIENT)
Dept: OBGYN | Facility: CLINIC | Age: 25
End: 2020-10-28
Attending: OBSTETRICS & GYNECOLOGY
Payer: COMMERCIAL

## 2020-10-28 ENCOUNTER — ANCILLARY PROCEDURE (OUTPATIENT)
Dept: ULTRASOUND IMAGING | Facility: CLINIC | Age: 25
End: 2020-10-28
Attending: OBSTETRICS & GYNECOLOGY
Payer: COMMERCIAL

## 2020-10-28 VITALS — SYSTOLIC BLOOD PRESSURE: 104 MMHG | DIASTOLIC BLOOD PRESSURE: 58 MMHG | WEIGHT: 143 LBS | BODY MASS INDEX: 21.74 KG/M2

## 2020-10-28 DIAGNOSIS — O09.92 SUPERVISION OF HIGH RISK PREGNANCY IN SECOND TRIMESTER: Primary | ICD-10-CM

## 2020-10-28 DIAGNOSIS — O46.92 VAGINAL BLEEDING IN PREGNANCY, SECOND TRIMESTER: ICD-10-CM

## 2020-10-28 PROCEDURE — 99207 PR PRENATAL VISIT: CPT | Performed by: OBSTETRICS & GYNECOLOGY

## 2020-10-28 PROCEDURE — 76815 OB US LIMITED FETUS(S): CPT | Performed by: OBSTETRICS & GYNECOLOGY

## 2020-10-29 NOTE — PROGRESS NOTES
"Patient has not had any bleeding for a week now since in MAC last and thought maybe had SROM  No further leaking watery fluid. Has intermittent vaginal d/c that \"smells like blood\" but doesn't see any  Had a LL placenta by anatomy scan here and after first bleed had a scan at [facility] that called it previa  Repeat today shows that it is 2.2cm away from the cervix. The cervix is long and closed at 3.6cm. there appears to be a few intermitten areas of either fluid/blood or just nabothian cysts in the cervix but as no active bleeding will just monitor  Cont pelvic rest but can o/w lighten on general exertion/activity  Good FM, definitely 1-2 hours of BH every night and totally fine by morning. Reviewed what types of ctx, frequency, intensity she should call for. Reassured evening BH are very common  Return in 4 weeks and do gct, cbc, tdap. Will repeat U/S again, as it was already scheduled, and do full growth and position check and placenta/cervix check  Patient with a lot of anxiety related to pregnancy sx so another U/S to show further placental attachement site and normal growth will reassure her  Return 4 weeks.  Wants to delay her flu shot until next visit  "

## 2020-11-18 ENCOUNTER — TELEPHONE (OUTPATIENT)
Dept: OBGYN | Facility: CLINIC | Age: 25
End: 2020-11-18

## 2020-11-18 ENCOUNTER — NURSE TRIAGE (OUTPATIENT)
Dept: NURSING | Facility: CLINIC | Age: 25
End: 2020-11-18

## 2020-11-18 DIAGNOSIS — Z23 NEED FOR TDAP VACCINATION: ICD-10-CM

## 2020-11-18 DIAGNOSIS — Z36.9 ENCOUNTER FOR ANTENATAL SCREENING OF MOTHER: Primary | ICD-10-CM
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
## 2020-11-20 ENCOUNTER — TELEPHONE (OUTPATIENT)
Dept: OBGYN | Facility: CLINIC | Age: 25
End: 2020-11-20

## 2020-11-20 NOTE — TELEPHONE ENCOUNTER
Pt returned call  27w1d  Kicked a baby gate hard - didn't realize it was in the way.  She did not fall or hit her abdomen at all.  Reassured pt and explained would be concerned if she hit her abdomen or fell hard on abdomen or butt/sides.  No contact to her abdomen.  Call with any concerns   Pt verbalized understanding, in agreement with plan, and voiced no further questions.  Lola Mcwilliams RN on 11/20/2020 at 4:23 PM

## 2020-11-20 NOTE — TELEPHONE ENCOUNTER
Patient called and asked to speak with a nurse, she is currently pregnant and kicked something really hard and just wants to make sure the baby is okay. Please return call.

## 2020-11-20 NOTE — TELEPHONE ENCOUNTER
27w1d    Can she eat cassava - root veggie in pregnancy - planning to eat it cooked.  Reassured pt it is ok to eat in pregnancy    Pt verbalized understanding, in agreement with plan, and voiced no further questions.  Lola Mcwilliams RN on 11/20/2020 at 2:13 PM

## 2020-11-24 ENCOUNTER — TELEPHONE (OUTPATIENT)
Dept: OBGYN | Facility: CLINIC | Age: 25
End: 2020-11-24

## 2020-11-24 NOTE — TELEPHONE ENCOUNTER
Please call her asap - 27 wks 5days, in bed sick - possibly food poisoning, asking to speak with nurse.

## 2020-11-24 NOTE — TELEPHONE ENCOUNTER
27w5d    Woke up at 0500 with terrible stomach pain  Nausea throughout the day and has not vomited and denies diarrhea  No fever; no other sx's of cough, cold sx's, sob  Denies contractions, LOF/VB or DFM    States she is drinking fluids but limited food intake.   Instructed pt to focus on hydration - fluids such as gatorade, lemon/lime soda, water, ice and bland BRAT diet today. Increase as tolerated. She has apt tomorrow with Dr Harrison - if wakes up with worsening sx's or additional sxs' please call to reschedule. Glucose screen tomorrow - can reschedule that if still nausea or just starting to feel better so we don't make her feel worse.    Pt verbalized understanding, in agreement with plan, and voiced no further questions.  Lola Mcwilliams RN on 11/24/2020 at 1:23 PM

## 2020-11-25 ENCOUNTER — PRENATAL OFFICE VISIT (OUTPATIENT)
Dept: OBGYN | Facility: CLINIC | Age: 25
End: 2020-11-25
Attending: OBSTETRICS & GYNECOLOGY
Payer: COMMERCIAL

## 2020-11-25 ENCOUNTER — ANCILLARY PROCEDURE (OUTPATIENT)
Dept: ULTRASOUND IMAGING | Facility: CLINIC | Age: 25
End: 2020-11-25
Attending: OBSTETRICS & GYNECOLOGY
Payer: COMMERCIAL

## 2020-11-25 VITALS — DIASTOLIC BLOOD PRESSURE: 64 MMHG | BODY MASS INDEX: 21.74 KG/M2 | WEIGHT: 143 LBS | SYSTOLIC BLOOD PRESSURE: 98 MMHG

## 2020-11-25 DIAGNOSIS — F42.9 OBSESSIVE-COMPULSIVE DISORDER, UNSPECIFIED TYPE: ICD-10-CM

## 2020-11-25 DIAGNOSIS — O99.342 ANXIETY DURING PREGNANCY IN SECOND TRIMESTER, ANTEPARTUM: ICD-10-CM

## 2020-11-25 DIAGNOSIS — F41.9 ANXIETY DURING PREGNANCY IN SECOND TRIMESTER, ANTEPARTUM: ICD-10-CM

## 2020-11-25 DIAGNOSIS — O46.92 VAGINAL BLEEDING IN PREGNANCY, SECOND TRIMESTER: ICD-10-CM

## 2020-11-25 DIAGNOSIS — O09.92 SUPERVISION OF HIGH RISK PREGNANCY IN SECOND TRIMESTER: ICD-10-CM

## 2020-11-25 DIAGNOSIS — O09.92 SUPERVISION OF HIGH RISK PREGNANCY IN SECOND TRIMESTER: Primary | ICD-10-CM

## 2020-11-25 PROCEDURE — 99214 OFFICE O/P EST MOD 30 MIN: CPT | Performed by: OBSTETRICS & GYNECOLOGY

## 2020-11-25 PROCEDURE — 76816 OB US FOLLOW-UP PER FETUS: CPT | Performed by: OBSTETRICS & GYNECOLOGY

## 2020-11-25 RX ORDER — SERTRALINE HYDROCHLORIDE 25 MG/1
25 TABLET, FILM COATED ORAL DAILY
Qty: 180 TABLET | Refills: 0 | Status: SHIPPED | OUTPATIENT
Start: 2020-11-25 | End: 2020-11-30 | Stop reason: ALTCHOICE

## 2020-11-29 NOTE — PROGRESS NOTES
"From a pregnancy standpoint she is doing well. Growth U/S repeated and normal growth at 2-8#=52%. 92/66/36/26. MVP normal at 4.9cm. the left ovarian cyst is slightly smaller at 3.5cm and was 4cm in the past  Continues to have BH all the time and some are a bit more uncomfortable but nothing that persists with a pattern that is painful. No further bleeding  Tons of fm. No VB or LOF or change in vaginal discharge  Patient then mentions that she's had anxiety for years and even some ocd. Has been working with a therapist and \"she doesn't have time for me\" has asked about meds b/c during pregnancy last time and this time is when it's the worst and then after she delivered last time, was much better. Therapist said there's no point to meds b/c wouldn't kick in fast enough in preg to make it worthwhile and so has been just trying to deal with it and really struggling.  Patient has [place] at baseline and not just preg. Had it in college. Was put on a low dose fluox and took it for 2 days but had worsening agitation and horrible insomnia and then never tried it or any other med again. Has been afraid to have that effect again and assumed all the meds had similar side effects. However is definitely ruminating and worrying constantly. Didn't give more details on the OCD part but implied that a lot of it is obsessive thinking.  Discussed SSRIs in general and in pregnancy, safety, side effects, benefit, etc. Despite worsening sx in pregnancy that she feels like improved after last preg she clearly has anxiety at baseline and even OCD and that is likely improved after pregnancy but not gone and she is just relieved to have it lessen that doesn't notice the baseline amount  Strongly encouraged her to start on selective serotonin reuptake inhibitor and have felt she needed this for a long time. Discussed lexapro having less flattening/libido/weight s.e but slightly more risk of paradoxic agitation/insomnia. Sertraline has much less " of that risk but can be more flattening and has some S.A.D as well. Recent meta seemed to show durga was safest of the SSRIs but many years of sertraline experience and think either would be reasonable.   Would like to start with sert given her s.e with fluox last time. Wants to start very low dose of 25mg and then double up if tolerating it rather than start with cutting 50mg in half.  rx sent and will f/u every 2 weeks for pregnancy so can assess how she is doing at that time. Denies any SI/HI/delusional thought.  Spent 15 min, 100% of which was in face to face counseling time with the patient, in addition to her routine prenatal.  Has had some GI issues the last few days so decided to delay her GCT/CBC/Tdap until next week and will set up a sep lab only appointment for that

## 2020-11-30 ENCOUNTER — TELEPHONE (OUTPATIENT)
Dept: OBGYN | Facility: CLINIC | Age: 25
End: 2020-11-30
Payer: COMMERCIAL

## 2020-11-30 NOTE — TELEPHONE ENCOUNTER
rec'd rejection from pharmacy for the sertraline 25mg tabs.   Dr. Harrison would like to have new prescription sent for the 50mg tabs-take half tab (25mg) daily. Include note to have pharmacy pre-cut these for her.    Lola FELIX sent new prescription.  Patient informed on PHI that there will be a new prescription at pharmacy-call first to be sure covered prior to driving in. She is to notify us if there is issue with coverage.    Of note she has tried Prozac in the past-had insomnia, racing heart, increased anxiety and jittery. Assumed she would have same effects from Lexapro. Patient wanted to start the sertraline at low dose possible even though starting dose is 50mg.

## 2020-12-02 DIAGNOSIS — O09.92 SUPERVISION OF HIGH RISK PREGNANCY IN SECOND TRIMESTER: ICD-10-CM

## 2020-12-02 DIAGNOSIS — Z36.9 ENCOUNTER FOR ANTENATAL SCREENING OF MOTHER: ICD-10-CM

## 2020-12-02 LAB
ERYTHROCYTE [DISTWIDTH] IN BLOOD BY AUTOMATED COUNT: 13.2 % (ref 10–15)
GLUCOSE 1H P 50 G GLC PO SERPL-MCNC: 102 MG/DL (ref 60–129)
HCT VFR BLD AUTO: 36.6 % (ref 35–47)
HGB BLD-MCNC: 12 G/DL (ref 11.7–15.7)
MCH RBC QN AUTO: 29.1 PG (ref 26.5–33)
MCHC RBC AUTO-ENTMCNC: 32.8 G/DL (ref 31.5–36.5)
MCV RBC AUTO: 89 FL (ref 78–100)
PLATELET # BLD AUTO: 211 10E9/L (ref 150–450)
RBC # BLD AUTO: 4.13 10E12/L (ref 3.8–5.2)
WBC # BLD AUTO: 10.3 10E9/L (ref 4–11)

## 2020-12-02 PROCEDURE — 85027 COMPLETE CBC AUTOMATED: CPT | Performed by: OBSTETRICS & GYNECOLOGY

## 2020-12-02 PROCEDURE — 82950 GLUCOSE TEST: CPT | Performed by: OBSTETRICS & GYNECOLOGY

## 2020-12-03 ENCOUNTER — HOSPITAL ENCOUNTER (OUTPATIENT)
Facility: CLINIC | Age: 25
Discharge: HOME | End: 2020-12-03
Attending: OBSTETRICS & GYNECOLOGY | Admitting: OBSTETRICS & GYNECOLOGY
Payer: COMMERCIAL

## 2020-12-03 ENCOUNTER — NURSE TRIAGE (OUTPATIENT)
Dept: NURSING | Facility: CLINIC | Age: 25
End: 2020-12-03

## 2020-12-03 ENCOUNTER — HOSPITAL ENCOUNTER (OUTPATIENT)
Facility: CLINIC | Age: 25
End: 2020-12-03
Admitting: OBSTETRICS & GYNECOLOGY
Payer: COMMERCIAL

## 2020-12-03 VITALS
DIASTOLIC BLOOD PRESSURE: 64 MMHG | RESPIRATION RATE: 16 BRPM | SYSTOLIC BLOOD PRESSURE: 120 MMHG | HEART RATE: 81 BPM | TEMPERATURE: 98.2 F

## 2020-12-03 LAB
RUPTURE OF FETAL MEMBRANES BY ROM PLUS: NEGATIVE
SPECIMEN SOURCE: NORMAL
WET PREP SPEC: NORMAL

## 2020-12-03 PROCEDURE — 59025 FETAL NON-STRESS TEST: CPT

## 2020-12-03 PROCEDURE — G0463 HOSPITAL OUTPT CLINIC VISIT: HCPCS | Mod: 25

## 2020-12-03 PROCEDURE — 84112 EVAL AMNIOTIC FLUID PROTEIN: CPT | Performed by: OBSTETRICS & GYNECOLOGY

## 2020-12-03 PROCEDURE — 87210 SMEAR WET MOUNT SALINE/INK: CPT | Performed by: OBSTETRICS & GYNECOLOGY

## 2020-12-03 RX ORDER — ONDANSETRON 2 MG/ML
4 INJECTION INTRAMUSCULAR; INTRAVENOUS EVERY 6 HOURS PRN
Status: DISCONTINUED | OUTPATIENT
Start: 2020-12-03 | End: 2020-12-04 | Stop reason: HOSPADM

## 2020-12-03 NOTE — TELEPHONE ENCOUNTER
29w pregnant -  - earlier this afternoon she thought that her vaginal discharge was slightly discolored. Now, she's wondering if she lost part of her mucus plug. Is having a lot of pelvic pressure, and super thick yellow-brown mucus with blood streaks in it. Feels baby move, but it feels very low. No fever. No contractions but has been having a lot of Francisco Javier Cortes contractions for awhile now.     Per protocol paged on-call MD Veronica Be @ 2343 - advised L&D - door 6 OB triage.        - called patient back and advised of above. Called L&D to inform.     Selina Bolden RN on 12/3/2020 at 5:43 PM    Reason for Disposition    [1] Pregnant 24-36 weeks () AND [2] pinkish or brownish mucous discharge    Additional Information    Negative: [1] Vaginal bleeding AND [2] pregnant > 20 weeks    Negative: [1] Vaginal bleeding AND [2] pregnant < 20 weeks    Negative: [1] Having contractions or other symptoms of labor AND [2] < 37 weeks pregnant (i.e., )    Negative: [1] Having contractions or other symptoms of labor AND [2] > 36 weeks pregnant (i.e., term pregnancy)    Negative: Leakage of fluid (trickle, gush) from the vagina    Negative: Foreign body in vagina (e.g., tampon)    Negative: Pain or burning with passing urine (urination) is main symptom    Negative: [1] Pregnant 23 or more weeks AND [2] baby is moving less today (e.g., kick count < 5 in 1 hour or < 10 in 2 hours)    Negative: Patient sounds very sick or weak to the triager    Negative: [1] Constant abdominal pain AND [2] present > 2 hours    Negative: [1] Intermittent lower abdominal pain AND [2] present > 24 hours    Protocols used: PREGNANCY - VAGINAL [id number]

## 2020-12-04 ENCOUNTER — PRENATAL OFFICE VISIT (OUTPATIENT)
Dept: OBGYN | Facility: CLINIC | Age: 25
End: 2020-12-04
Payer: COMMERCIAL

## 2020-12-04 VITALS — WEIGHT: 145 LBS | BODY MASS INDEX: 22.05 KG/M2 | DIASTOLIC BLOOD PRESSURE: 58 MMHG | SYSTOLIC BLOOD PRESSURE: 102 MMHG

## 2020-12-04 DIAGNOSIS — O09.93 SUPERVISION OF HIGH RISK PREGNANCY IN THIRD TRIMESTER: Primary | ICD-10-CM

## 2020-12-04 DIAGNOSIS — N89.8 VAGINAL DISCHARGE: ICD-10-CM

## 2020-12-04 PROCEDURE — 99212 OFFICE O/P EST SF 10 MIN: CPT | Performed by: OBSTETRICS & GYNECOLOGY

## 2020-12-04 NOTE — PLAN OF CARE
Cervical exam done per Dr Be request. 1/50/-2. Order to recheck in 2 hours and discharge if unchanged. Notify MD either way. Cervix recheck unchanged. Pt no longer having any contractions. MD paged x 2 for update. No return call. Discharge instructions reviewed with pt. Verbalized understanding and agreement. Per Dr Be, pt to follow up with Dr Harrison tomorrow. Pt will call in AM for an appointment. Discharged ambulatory.

## 2020-12-04 NOTE — PROVIDER NOTIFICATION
12/03/20 3010   Provider Notification   Method of Notification Phone   updated Dr. Be with pt arrival status orders received to monitor. Obtain a wet prep, rom plus and cervical exam. Update with results.

## 2020-12-04 NOTE — PROGRESS NOTES
Patient was seen in [facility] yesterday and asked to f/u today so here for extra appointment.  Had some dark brown streaky blood in her discharge yesterday am but slight. Then later in the day had a large mucous glob of discharge that she passed with much more blood but all very dark mixed in it. Had a lot of BH and a lot of pelvic pressure and heaviness and called and FNA had her come in.    Had a wet prep and ROM + and both were neg. Had ctx on the monitor that she felt as her normal tightening but not more than that. Was found to be 1/50 and high so monitored longer and recheckd and cervix was not more dilated.  With her last preg she was 4cm at her first check at 36 weeks already.  Reports that today she is not having any more discharge or blood. Nothing brown or heavy. The pelvic pressure and tightening are both much less and actually prefers to not have a digital exam that could stir things up again  Likely is someone who dilates early given her 4cm at 36 weeks with her first. However she delivered full term at 40+2 so no reason to expect  labor  In a multip can have more effacement and dilation earlier and def more BH so the mucous d/c may be one time or may have it repeatedly  Discussed si/sx to watch for and come in for vs what can be monitored at home  +kavita  Finally has the rx for sertraline at the pharmacy but now isn't sure if she wants to take it or not. Encouraged her to take it as her anxiety is more pronounced than she is giving it credit for. Patient will think about it and likes the peace of mind of knowing it's there now or postpartum  Return for routine appointment Wednesday as planned

## 2020-12-09 ENCOUNTER — PRENATAL OFFICE VISIT (OUTPATIENT)
Dept: OBGYN | Facility: CLINIC | Age: 25
End: 2020-12-09
Payer: COMMERCIAL

## 2020-12-09 VITALS — DIASTOLIC BLOOD PRESSURE: 70 MMHG | BODY MASS INDEX: 21.74 KG/M2 | SYSTOLIC BLOOD PRESSURE: 104 MMHG | WEIGHT: 143 LBS

## 2020-12-09 DIAGNOSIS — O99.343 ANXIETY DURING PREGNANCY IN THIRD TRIMESTER, ANTEPARTUM: ICD-10-CM

## 2020-12-09 DIAGNOSIS — F41.9 ANXIETY DURING PREGNANCY IN THIRD TRIMESTER, ANTEPARTUM: ICD-10-CM

## 2020-12-09 DIAGNOSIS — O09.93 SUPERVISION OF HIGH RISK PREGNANCY IN THIRD TRIMESTER: Primary | ICD-10-CM

## 2020-12-09 DIAGNOSIS — F41.9 ANXIETY DURING PREGNANCY IN SECOND TRIMESTER, ANTEPARTUM: ICD-10-CM

## 2020-12-09 DIAGNOSIS — R35.1 NOCTURIA MORE THAN TWICE PER NIGHT: ICD-10-CM

## 2020-12-09 DIAGNOSIS — O99.342 ANXIETY DURING PREGNANCY IN SECOND TRIMESTER, ANTEPARTUM: ICD-10-CM

## 2020-12-09 LAB
ALBUMIN UR-MCNC: NEGATIVE MG/DL
APPEARANCE UR: CLEAR
BACTERIA #/AREA URNS HPF: ABNORMAL /HPF
BILIRUB UR QL STRIP: NEGATIVE
COLOR UR AUTO: YELLOW
GLUCOSE UR STRIP-MCNC: NEGATIVE MG/DL
HGB UR QL STRIP: NEGATIVE
KETONES UR STRIP-MCNC: NEGATIVE MG/DL
LEUKOCYTE ESTERASE UR QL STRIP: ABNORMAL
NITRATE UR QL: NEGATIVE
NON-SQ EPI CELLS #/AREA URNS LPF: ABNORMAL /LPF
PH UR STRIP: 8.5 PH (ref 5–7)
RBC #/AREA URNS AUTO: ABNORMAL /HPF
SOURCE: ABNORMAL
SP GR UR STRIP: 1.02 (ref 1–1.03)
UROBILINOGEN UR STRIP-ACNC: 0.2 EU/DL (ref 0.2–1)
WBC #/AREA URNS AUTO: ABNORMAL /HPF

## 2020-12-09 PROCEDURE — 99207 PR PRENATAL VISIT: CPT | Performed by: OBSTETRICS & GYNECOLOGY

## 2020-12-09 PROCEDURE — 81001 URINALYSIS AUTO W/SCOPE: CPT | Performed by: OBSTETRICS & GYNECOLOGY

## 2020-12-13 NOTE — PROGRESS NOTES
Patient was here just 5 days earlier b/c of some ctx and vaginal pressure and being eval'd in [facility]. By time of clinic visit it had all stopped so declined cervix check   Today states she is still having all of her normal BH and they are frequent, more at night, some taking breath away but those aren't regular  No VB or LOF or abnormal discharge, though has generally heavy discharge  Good FM, vaginal pressure is present but realizes now that this is going to be normal for her the next 10 weeks and unless the pain is intolerable and completely regular q5min ctx she will not go back in to [facility]. Discussed the different types of triaging she will get during office hours from our RNs vs FNA after hours and the latter will always default to having her come in with these complaints and it's ok to triage herself a bit too where she feels comfortable doing it  Had her GCT between thes 2 appts and passed her GCT and hgb/plts were normal  However it is unclear and doesn't appear that she has done a tdap nor flu shot. Confirm next visit in 2 weeks

## 2020-12-20 ENCOUNTER — MYC MEDICAL ADVICE (OUTPATIENT)
Dept: OBGYN | Facility: CLINIC | Age: 25
End: 2020-12-20

## 2020-12-21 ENCOUNTER — OFFICE VISIT (OUTPATIENT)
Dept: OBGYN | Facility: CLINIC | Age: 25
End: 2020-12-21
Payer: COMMERCIAL

## 2020-12-21 ENCOUNTER — TELEPHONE (OUTPATIENT)
Dept: OBGYN | Facility: CLINIC | Age: 25
End: 2020-12-21

## 2020-12-21 VITALS — DIASTOLIC BLOOD PRESSURE: 62 MMHG | WEIGHT: 145 LBS | SYSTOLIC BLOOD PRESSURE: 98 MMHG | BODY MASS INDEX: 22.05 KG/M2

## 2020-12-21 DIAGNOSIS — R30.0 DYSURIA: ICD-10-CM

## 2020-12-21 DIAGNOSIS — N89.8 VAGINAL DISCHARGE: Primary | ICD-10-CM

## 2020-12-21 LAB
ALBUMIN UR-MCNC: NEGATIVE MG/DL
APPEARANCE UR: CLEAR
BILIRUB UR QL STRIP: NEGATIVE
COLOR UR AUTO: YELLOW
GLUCOSE UR STRIP-MCNC: NEGATIVE MG/DL
HGB UR QL STRIP: NEGATIVE
KETONES UR STRIP-MCNC: >=80 MG/DL
LEUKOCYTE ESTERASE UR QL STRIP: NEGATIVE
NITRATE UR QL: NEGATIVE
NON-SQ EPI CELLS #/AREA URNS LPF: ABNORMAL /LPF
PH UR STRIP: 7.5 PH (ref 5–7)
RBC #/AREA URNS AUTO: ABNORMAL /HPF
SOURCE: ABNORMAL
SP GR UR STRIP: 1.01 (ref 1–1.03)
UROBILINOGEN UR STRIP-ACNC: 0.2 EU/DL (ref 0.2–1)
WBC #/AREA URNS AUTO: ABNORMAL /HPF

## 2020-12-21 PROCEDURE — 99213 OFFICE O/P EST LOW 20 MIN: CPT | Performed by: OBSTETRICS & GYNECOLOGY

## 2020-12-21 PROCEDURE — 87205 SMEAR GRAM STAIN: CPT | Performed by: OBSTETRICS & GYNECOLOGY

## 2020-12-21 PROCEDURE — 87086 URINE CULTURE/COLONY COUNT: CPT | Performed by: OBSTETRICS & GYNECOLOGY

## 2020-12-21 PROCEDURE — 87661 TRICHOMONAS VAGINALIS AMPLIF: CPT | Performed by: OBSTETRICS & GYNECOLOGY

## 2020-12-21 PROCEDURE — 81001 URINALYSIS AUTO W/SCOPE: CPT | Performed by: OBSTETRICS & GYNECOLOGY

## 2020-12-21 NOTE — NURSING NOTE
PT STATES IT is hard sleeping at night as symptoms worsened.    Leidy Valiente MA on 12/21/2020 at 4:08 PM

## 2020-12-21 NOTE — TELEPHONE ENCOUNTER
Patient is 31 weeks pregnant and is experiencing discomfort from what she thinks is an infection.  She would like some advice.    Please give her a call back.    Thank you

## 2020-12-21 NOTE — TELEPHONE ENCOUNTER
Feels like she has a UTI  Urine frequency especially at night  Itching  Feels like this is new and she did make an apt with Dr Hooker this afternoon for proper evaluation.     No further questions.  Lola Mcwilliams RN on 12/21/2020 at 10:03 AM

## 2020-12-21 NOTE — NURSING NOTE
Pt states she has been having a yellow sticky discharge    Leidy Valiente MA on 12/21/2020 at 4:10 PM

## 2020-12-21 NOTE — PROGRESS NOTES
SUBJECTIVE:                                                   Melina Palacios is a 25 year old female who presents to clinic today for the following health issue(s):  Patient presents with:  UTI: [id number]  Prenatal Care: 31w4d      HPI:  Has been having discharge for long time. Occ will be mucousy and some blood in it. Had gone to [facility] earlier in month for evaluation. Has some irritation not sure if just from normal discharge or what. Will be yellow and mucosy at times. This will happen every few days.   Will feel like has to urinate more often in last 3 d. Not sure if normal in pregnancy.  No fevers, chills. No blood in urine. No burning with urination  Has not self treated.     Reviewed epic: has had multiple UA's, gram stains for urinary/vaginal discharge complaints. Negative results.    Has not started taking the zoloft previously prescribed. Working with a therapist.     .  Review of PMH, SocHx, SurHx, FHx, medications completed. Epic updated.           Patient's last menstrual period was 2020..     Patient is sexually active, .  Using none for contraception.    reports that she has never smoked. She has never used smokeless tobacco.      Health maintenance updated:  no    Today's PHQ-2 Score:   PHQ-2 (  [employer]) 2020   Q1: Little interest or pleasure in doing things 0   Q2: Feeling down, depressed or hopeless 0   PHQ-2 Score 0   Q1: Little interest or pleasure in doing things Not at all   Q2: Feeling down, depressed or hopeless Not at all   PHQ-2 Score 0     Today's PHQ-9 Score:   PHQ-9 SCORE 2020   PHQ-9 Total Score 8     Today's NY-7 Score:   NY-7 SCORE 2020   Total Score 7       Problem list and histories reviewed & adjusted, as indicated.  Additional history: as documented.    Patient Active Problem List   Diagnosis     Situational anxiety     Irritable bowel syndrome with diarrhea     History of UTI     Supervision of high-risk pregnancy     Indication for care in labor  or delivery     Encounter for triage in pregnant patient     Indication for care or intervention in labor or delivery     Past Surgical History:   Procedure Laterality Date     ENT SURGERY      wisdom teeth extraction      wisdom teeth        Social History     Tobacco Use     Smoking status: Never Smoker     Smokeless tobacco: Never Used   Substance Use Topics     Alcohol use: No     Frequency: Never     Binge frequency: Never      Problem (# of Occurrences) Relation (Name,Age of Onset)    Breast Cancer (3) Mother, Maternal Aunt, Paternal Grandmother    Cancer (2) Mother, Maternal Aunt    No Known Problems (5) Father, Sister, Brother, Maternal Grandfather, Other    Thyroid Disease (1) Maternal Grandmother            Current Outpatient Medications   Medication Sig     Prenat w/o U-GD-Rwjqbft-FA-DHA (PNV-DHA PO)      sertraline (ZOLOFT) 50 MG tablet Take 0.5 tablets (25 mg) by mouth daily (Patient not taking: Reported on 12/21/2020)     No current facility-administered medications for this visit.      Allergies   Allergen Reactions     Azithromycin Nausea and Vomiting     In high school   Other reaction(s): GI Upset  Other reaction(s): Gastrointestinal  Other reaction(s): Vomiting     Penicillins Hives     Amoxicillin Hives and Rash     In high school        ROS:  12 point review of systems negative other than symptoms noted below or in the HPI.  Genitourinary: Frequency, Pelvic Pain, Urgency, Vaginal Itching and VAGINAL ODOR        OBJECTIVE:     BP 98/62   Wt 65.8 kg (145 lb)   LMP 05/14/2020   BMI 22.05 kg/m    Body mass index is 22.05 kg/m .     +FHT    Exam:  Constitutional:  Appearance: Well nourished, well developed alert, in no acute distress  Chest: nonlabored breathing.  Gastrointestinal:  Gravid, Abdominal Examination:  Abdomen nontender to palpation, tone normal without rigidity or guarding, no masses present, umbilicus without lesions  Lymphatic: Lymph Nodes:  No other lymphadenopathy present  Skin:  General Inspection:  No rashes present, no lesions present, no areas of discoloration.  Neurologic:  Mental Status:  Oriented X3.  Normal strength and tone, sensory exam grossly normal, mentation intact and speech normal.    Psychiatric:  Mentation appears normal and affect normal/bright.  Pelvic Exam:  External Genitalia:     Normal appearance for age, moderate white discharge present, no bleeding, no tenderness present, no inflammatory lesions present, color normal  Vagina:     Normal vaginal vault without central or paravaginal defects, no discharge present, no inflammatory lesions present, no masses present  Bladder:     Nontender to palpation  Urethra:   Urethral Body:  Urethra palpation normal, urethra structural support normal   Urethral Meatus:  No erythema or lesions present  Cervix:     Appearance healthy, no lesions present, nontender to palpation, no bleeding present  Perineum:     Perineum within normal limits, no evidence of trauma, no rashes or skin lesions present  Anus:     Anus within normal limits, no hemorrhoids present  Inguinal Lymph Nodes:     No lymphadenopathy present  Pubic Hair:     Normal pubic hair distribution for age  Genitalia and Groin:     No rashes present, no lesions present, no areas of discoloration, no masses present       In-Clinic Test Results:  Results for orders placed or performed in visit on 12/21/20 (from the past 24 hour(s))   UA with Microscopic reflex to Culture    Specimen: Midstream Urine   Result Value Ref Range    Color Urine Yellow     Appearance Urine Clear     Glucose Urine Negative NEG^Negative mg/dL    Bilirubin Urine Negative NEG^Negative    Ketones Urine >=80 (A) NEG^Negative mg/dL    Specific Gravity Urine 1.015 1.003 - 1.035    pH Urine 7.5 (H) 5.0 - 7.0 pH    Protein Albumin Urine Negative NEG^Negative mg/dL    Urobilinogen Urine 0.2 0.2 - 1.0 EU/dL    Nitrite Urine Negative NEG^Negative    Blood Urine Negative NEG^Negative    Leukocyte Esterase Urine  Negative NEG^Negative    Source Midstream Urine     WBC Urine 0 - 5 OTO5^0 - 5 /HPF    RBC Urine O - 2 OTO2^O - 2 /HPF    Squamous Epithelial /LPF Urine Few FEW^Few /LPF       ASSESSMENT/PLAN:                                                        ICD-10-CM    1. Vaginal discharge  N89.8 Gram stain     Trichomonas vaginalis DNA PCR   2. Dysuria  R30.0 UA with Microscopic reflex to Culture     Urine Culture Aerobic Bacterial           -Previous gram stains/wet preps (multiple) w/o WBCs or infection. Normal UAs. Suspect normal pregnancy symptoms as her complaints have been persistent this pregnancy. However, will check for alternative infectious origin.   UA reviewed with pt, neg.   Rec desitin for skin protectant from irritation due to discharge.   Reassurance provided  Has regularly scheduled Appt with Hunter on Wed she plans to keep.     Carolina Orellana Masters, DO  [facility] [place]

## 2020-12-22 LAB
GRAM STN SPEC: NORMAL
Lab: NORMAL
SPECIMEN SOURCE: NORMAL
SPECIMEN SOURCE: NORMAL
T VAGINALIS DNA SPEC QL NAA+PROBE: NORMAL

## 2020-12-23 ENCOUNTER — PRENATAL OFFICE VISIT (OUTPATIENT)
Dept: OBGYN | Facility: CLINIC | Age: 25
End: 2020-12-23
Payer: COMMERCIAL

## 2020-12-23 VITALS — WEIGHT: 145 LBS | DIASTOLIC BLOOD PRESSURE: 60 MMHG | BODY MASS INDEX: 22.05 KG/M2 | SYSTOLIC BLOOD PRESSURE: 102 MMHG

## 2020-12-23 DIAGNOSIS — F41.9 ANXIETY DURING PREGNANCY IN THIRD TRIMESTER, ANTEPARTUM: ICD-10-CM

## 2020-12-23 DIAGNOSIS — O09.93 SUPERVISION OF HIGH RISK PREGNANCY IN THIRD TRIMESTER: Primary | ICD-10-CM

## 2020-12-23 DIAGNOSIS — O99.343 ANXIETY DURING PREGNANCY IN THIRD TRIMESTER, ANTEPARTUM: ICD-10-CM

## 2020-12-23 LAB
BACTERIA SPEC CULT: NORMAL
Lab: NORMAL
SPECIMEN SOURCE: NORMAL

## 2020-12-23 PROCEDURE — 99207 PR PRENATAL VISIT: CPT | Performed by: OBSTETRICS & GYNECOLOGY

## 2020-12-23 PROCEDURE — 59426 ANTEPARTUM CARE ONLY: CPT | Performed by: OBSTETRICS & GYNECOLOGY

## 2020-12-23 NOTE — PROGRESS NOTES
Appetite not great. Can't eat any veggies and hardly any meat. No weight gain in a few weeks now  Discussed carbs, protein shakes, powders, smoothies, prenatals, etc will try to do some protein powder and drinking a lot of milk etc  Good FM  Lots of BH but nothing regular or painful  Continues to intermittently have really yellow mucous d/c and then blood tinge to it. Now isn't as worried about it b/c it tends to stop on it's own in a day. Was however in to see Dr. Hooker just 2 days ago with what seemed like UTI sx however no UTI found and no vaginitis  Reviewed si/sx of UTI vs just normal pregnancy sx  Discussed trying prilosec for indigestion/burping/belching and more just to see if that helps improve appetite and decrease nausea and food aversions. tums working at bedtime for actual heart burn so hadn't tried prilosec for the other  Hasn't started her selective serotonin reuptake inhibitor yet  FH is slightly small at 30-31. Has growth U/S in 2 weeks. Encouraged increase nutrient rich foods

## 2020-12-28 ENCOUNTER — MYC MEDICAL ADVICE (OUTPATIENT)
Dept: OBGYN | Facility: CLINIC | Age: 25
End: 2020-12-28

## 2020-12-28 DIAGNOSIS — Z13.21 ENCOUNTER FOR VITAMIN DEFICIENCY SCREENING: Primary | ICD-10-CM

## 2020-12-28 NOTE — TELEPHONE ENCOUNTER
I never meant to imply she had a random therapist off the street. I simply meant that she was feeling like it was maybe not a good fit with that person so that a new person with a fresh clean slate would help.    I am not sure what she's looking for at this point. The medication is the #1 thing I would recommend and she will see that within a couple weeks she will start to feel better and her worry about taking medicine will dissipate too. I have probably 30% of my patients on SSRIs and there is absolutely zero correlation to stillbirth or any other complications. However her mental health suffering like this is much more of a detriment to her and her overall health in general and that is why I recommend it.    If she is interested in a day program she can certainly be referred there. Her therapist would have resources for this too since we want it to be within the same system if her therapist is part of a system. If she's independent then she may know which program is the best for her

## 2020-12-28 NOTE — TELEPHONE ENCOUNTER
It's her anxiety that is causing her to be afraid of the medication. Her ocd is causing her rumination on things that she knows aren't rational. I'm not sure what she's exactly asking to do.   If she doesn't want meds to fix this and wants to wait until she delivers to see if it resolves than obviously she can do that but she herself is saying that this is not a way she can be for 6-7 more weeks.    She is seeing a therapist and has been unhappy with that person helping her to work through her ocd and anxiety. I can say without a shred of a doubt she has baseline anxiety with OCD and though it may be better when she delivers, she clearly has it at other times in her life as she herself described    My options are   1)take the sertraline as it will be life changing  2)i can give her some hydroxyzine which is good for sleep and anxiety in pregnancy though will make sleepy if takes during the day, which is fine though  3)switch therapists and go to someone specializing in OCD to do behavioral therapy

## 2020-12-28 NOTE — TELEPHONE ENCOUNTER
We can certainly check her D when she's here but low D and fish oil do not cause anxiety and OCD. They can cause anhedonia, low mood, low motivation and energy but not this.    How much D is in her PNV? I recommend taking 2000 international unit(s)/day so if not that much then should buy extra and try to get at least 2000 but even a little more is fine. Is she taking DHA? Usually we recommend that at the NOB. Can buy that as well and start. Again this is much more for baby's eye and brain development and her general heart health but there is no link to this and anxiety.    She has absolutely stereotypical generalized anxiety disorder. It is ramped up now at end of pregnancy as it does for many people but it is not b/c of her vitamin levels. Again, can check as a sep lab appointment this week or she can do it when here for her next in person ob visit if she wants

## 2020-12-29 NOTE — TELEPHONE ENCOUNTER
"I will never pressure anyone to do anything with their health and body that they don't feel comfortable with and I'd hope in the months that I've cared for her she knows that that is not who I am as a physician.  I care about her and I can see every single appointment that she is not happy and struggling. Some appts she discusses it more openly, some we don't address it, but I can see it clearly and my intention is only to help.    The initial contact ivana message essentially said I'm struggling with anxiety and don't know how I'll do this for 6-7 more weeks but I don't want meds and I'm already in counseling what else can be done\"    So my suggestions were the daily selective serotonin reuptake inhibitor b/c I think it would be the most helpful and in 20 yrs of prescribing SSRIs in pregnancy I would clearly never give her something that is dangerous for her unborn child.  I suggested a new therapist if she felt that this one was not connecting for her anymore or not what she needed  I suggested a more acute, safe short acting medication that can help with sleep and anxiety in the short term while still pregnant to buy time until hopefully she would again be feeling better after delivery.  I suggested doses of D and DHA she can take and will happily check those levels for her, however as her MD it is also my duty and obligation to inform her of true medical fact and just public opinion \"fact\"  So I wanted her to have a realistic appreciation for how vit D and DHA can help some things and not others and not falsely hang her hopes on something and then be disappointed.    My care is never and will never be to force anyone to do anything. My intention is only to be helpful from 20 yrs of experience and present all the options with factual expectations.  If she feels that there's something specific she was looking for from me in terms of suggestions, then let me know and we can discuss it, but I'm not sure exactly " "how I can help if she says her anxiety is really bad and yet the things I suggest she isn't interested in, but then still wants me to do something.     Stuck a bit between a rock and a hard place with how to help without coming off as me \"forcing or pressuring\" her.  "

## 2020-12-29 NOTE — TELEPHONE ENCOUNTER
Routing pt [employer] message to provider to advise.    Lola Mcwilliams RN on 12/29/2020 at 7:58 AM

## 2020-12-29 NOTE — TELEPHONE ENCOUNTER
There's only so much that can be done to help and at a certain point if she needs something else then that's what she needs.  Thank you for the sending messages back and forth

## 2020-12-31 ENCOUNTER — TELEPHONE (OUTPATIENT)
Dept: OBGYN | Facility: CLINIC | Age: 25
End: 2020-12-31

## 2020-12-31 DIAGNOSIS — O99.343 ANXIETY DURING PREGNANCY IN THIRD TRIMESTER, ANTEPARTUM: Primary | ICD-10-CM

## 2020-12-31 DIAGNOSIS — F41.9 ANXIETY DURING PREGNANCY IN THIRD TRIMESTER, ANTEPARTUM: Primary | ICD-10-CM

## 2020-12-31 DIAGNOSIS — F42.9 OBSESSIVE-COMPULSIVE DISORDER, UNSPECIFIED TYPE: ICD-10-CM

## 2020-12-31 RX ORDER — SERTRALINE HYDROCHLORIDE 25 MG/1
25 TABLET, FILM COATED ORAL DAILY
Qty: 90 TABLET | Refills: 0 | Status: ON HOLD | OUTPATIENT
Start: 2020-12-31 | End: 2021-02-03

## 2020-12-31 NOTE — TELEPHONE ENCOUNTER
Nervous about even taking the selective serotonin reuptake inhibitor's  Hx of a different selective serotonin reuptake inhibitor she took one tablet and got panicked and super  Went to the pharmacy to  an Rx for sertraline 50mg and was going to cut in half for 25 mg tablet daily  Is super nervous about even trying the new medication and was very nervous about cutting the pills in half to begin with.   Pt scheduled OV with another OB/GYN for next week and cancelled her growth US and visit with Dr Harrison on 1/8. Would like to try the medication but again very nervous.    2 requests:  1-asking for sertraline rx be re-sent for the 25mg tablets. She has been nervous about taking the cut 50 mg tablets to begin with and now since she did not pick rx up 2 wks ago, the pharmacy needs new Rx sent.    2-would like some sort of monitoring after starting the med as she is nervous she will have panic attack or some other reaction and now she is pregnant.    Called and spoke with Dr Harrison. Ok to send the rx as 25mg tablets w pt intending to pay out of pocket. Ok to offer NST 1-2d after she starts medication. Try to fit back in for [facility] US and visit with her next week if pt wishes. Offer weekly NST's while on zoloft as well.    Called pt back with recommendations as above. Pt wants to start the zoloft tomorrow. Rx re-sent to pharmacy as the 25mg tablets. NST for Saturday per pt wish and scheduled through [facility] for 1/2/21 at 1pm. (pt notified by [employer] confirmation)  Pt would like to try to reschedule growth US and visit with twila next week - scheduling able to fit her in 1/6 US and visit. Pt happy with this plan for now. Encouraged pt to call w any questions or concerns.    Pt verbalized understanding, in agreement with plan, and voiced no further questions.    Lola Mcwilliams RN on 12/31/2020 at 5:06 PM

## 2021-01-03 ENCOUNTER — HEALTH MAINTENANCE LETTER (OUTPATIENT)
Age: 26
End: 2021-01-03

## 2021-01-09 ENCOUNTER — HOSPITAL ENCOUNTER (OUTPATIENT)
Facility: CLINIC | Age: 26
Discharge: HOME | End: 2021-01-09
Attending: OBSTETRICS & GYNECOLOGY | Admitting: OBSTETRICS & GYNECOLOGY
Payer: COMMERCIAL

## 2021-01-09 VITALS — RESPIRATION RATE: 16 BRPM | TEMPERATURE: 97.7 F | SYSTOLIC BLOOD PRESSURE: 111 MMHG | DIASTOLIC BLOOD PRESSURE: 70 MMHG

## 2021-01-09 LAB
ALBUMIN UR-MCNC: NEGATIVE MG/DL
APPEARANCE UR: CLEAR
BILIRUB UR QL STRIP: NEGATIVE
COLOR UR AUTO: ABNORMAL
GLUCOSE UR STRIP-MCNC: NEGATIVE MG/DL
HGB UR QL STRIP: NEGATIVE
KETONES UR STRIP-MCNC: 10 MG/DL
LEUKOCYTE ESTERASE UR QL STRIP: NEGATIVE
NITRATE UR QL: NEGATIVE
PH UR STRIP: 6.5 PH (ref 5–7)
RBC #/AREA URNS AUTO: <1 /HPF (ref 0–2)
SOURCE: ABNORMAL
SP GR UR STRIP: 1 (ref 1–1.03)
SQUAMOUS #/AREA URNS AUTO: 2 /HPF (ref 0–1)
UROBILINOGEN UR STRIP-MCNC: 0 MG/DL (ref 0–2)
WBC #/AREA URNS AUTO: <1 /HPF (ref 0–5)

## 2021-01-09 PROCEDURE — 81001 URINALYSIS AUTO W/SCOPE: CPT | Performed by: OBSTETRICS & GYNECOLOGY

## 2021-01-09 PROCEDURE — G0463 HOSPITAL OUTPT CLINIC VISIT: HCPCS | Mod: 25

## 2021-01-09 PROCEDURE — 250N000011 HC RX IP 250 OP 636

## 2021-01-09 PROCEDURE — 59025 FETAL NON-STRESS TEST: CPT

## 2021-01-09 PROCEDURE — 96372 THER/PROPH/DIAG INJ SC/IM: CPT

## 2021-01-09 RX ORDER — TERBUTALINE SULFATE 1 MG/ML
0.25 INJECTION, SOLUTION SUBCUTANEOUS ONCE
Status: DISCONTINUED | OUTPATIENT
Start: 2021-01-09 | End: 2021-01-09 | Stop reason: HOSPADM

## 2021-01-09 RX ORDER — BETAMETHASONE SODIUM PHOSPHATE AND BETAMETHASONE ACETATE 3; 3 MG/ML; MG/ML
12 INJECTION, SUSPENSION INTRA-ARTICULAR; INTRALESIONAL; INTRAMUSCULAR; SOFT TISSUE ONCE
Status: COMPLETED | OUTPATIENT
Start: 2021-01-09 | End: 2021-01-09

## 2021-01-09 RX ORDER — BETAMETHASONE SODIUM PHOSPHATE AND BETAMETHASONE ACETATE 3; 3 MG/ML; MG/ML
INJECTION, SUSPENSION INTRA-ARTICULAR; INTRALESIONAL; INTRAMUSCULAR; SOFT TISSUE
Status: COMPLETED
Start: 2021-01-09 | End: 2021-01-09

## 2021-01-09 RX ORDER — ONDANSETRON 2 MG/ML
4 INJECTION INTRAMUSCULAR; INTRAVENOUS EVERY 6 HOURS PRN
Status: DISCONTINUED | OUTPATIENT
Start: 2021-01-09 | End: 2021-01-09 | Stop reason: HOSPADM

## 2021-01-09 RX ORDER — TERBUTALINE SULFATE 1 MG/ML
INJECTION, SOLUTION SUBCUTANEOUS
Status: DISCONTINUED
Start: 2021-01-09 | End: 2021-01-09 | Stop reason: HOSPADM

## 2021-01-09 RX ADMIN — BETAMETHASONE SODIUM PHOSPHATE AND BETAMETHASONE ACETATE 12 MG: 3; 3 INJECTION, SUSPENSION INTRA-ARTICULAR; INTRALESIONAL; INTRAMUSCULAR; SOFT TISSUE at 16:44

## 2021-01-09 NOTE — PROGRESS NOTES
1605: Pt presents to [facility] for  labor evaluation.  1614: Consent given to place external monitors and to obtain vital signs and medical history. Pt states she has been having regular ctx since yesterday. Pt states they are not particularly painful, but they are very tight. Pt denies any vaginal bleeding. Pt denies any urinary urgency, increased frequency, pain, or burning. Pt states she is having some discharge as well, which she says is white/clear in color and thicker, not thin like amniotic fluid.   1630: Ctx q2-4 min. Pt states it is tight but not painful. Telephone order from Dr. Mesa for SVE.  1635: Consent for SVE. Scant amount of white discharge noted on inner labia. No signs of bleeding or leaking of amniotic fluid. No odor. SVE 3/60-70/-1, soft, midline. Category 1 tracing.   1640: Telephone orders from Dr. Mesa for terbutaline and betamethasone and to continue to monitor for at least one hour after terbutaline.   1644: Pt consents to betamethasone. Pt unsure of terbutaline, as she has heard it can make her feel more anxious, and pt already struggles with anxiety. Dr. Mesa updated, and Dr. Mesa encouraged pt to take terbutaline, as it can help us navigate her plan of care in relation to her contractions. Pt up to BR at this time.  1655: Urine left and sent to lab for UA. Importance of terbutaline injection explained to pt, and pt states she would still like to hold off for another 1-2 hours to see how her ctx feel. Will continue to monitor. Dr. Mesa updated. Plan to recheck SVE at 1800.  1800: SVE 3/60-70/-2, slightly more posterior. Dr. Mesa updated. Plan to discharge pt, and for pt to follow up in [facility] tomorrow for second betamethasone, and to be seen in the clinic on Monday or Wednesday. Pt agrees with plan of care and is discharged home.

## 2021-01-10 ENCOUNTER — HOSPITAL ENCOUNTER (OUTPATIENT)
Facility: CLINIC | Age: 26
Discharge: HOME | End: 2021-01-10
Attending: OBSTETRICS & GYNECOLOGY | Admitting: OBSTETRICS & GYNECOLOGY
Payer: COMMERCIAL

## 2021-01-10 ENCOUNTER — HOSPITAL ENCOUNTER (OUTPATIENT)
Facility: CLINIC | Age: 26
End: 2021-01-10
Admitting: OBSTETRICS & GYNECOLOGY
Payer: COMMERCIAL

## 2021-01-10 PROCEDURE — 250N000011 HC RX IP 250 OP 636

## 2021-01-10 PROCEDURE — 96372 THER/PROPH/DIAG INJ SC/IM: CPT

## 2021-01-10 RX ORDER — BETAMETHASONE SODIUM PHOSPHATE AND BETAMETHASONE ACETATE 3; 3 MG/ML; MG/ML
12 INJECTION, SUSPENSION INTRA-ARTICULAR; INTRALESIONAL; INTRAMUSCULAR; SOFT TISSUE ONCE
Status: COMPLETED | OUTPATIENT
Start: 2021-01-10 | End: 2021-01-10

## 2021-01-10 RX ORDER — BETAMETHASONE SODIUM PHOSPHATE AND BETAMETHASONE ACETATE 3; 3 MG/ML; MG/ML
INJECTION, SUSPENSION INTRA-ARTICULAR; INTRALESIONAL; INTRAMUSCULAR; SOFT TISSUE
Status: COMPLETED
Start: 2021-01-10 | End: 2021-01-10

## 2021-01-10 RX ADMIN — BETAMETHASONE ACETATE AND BETAMETHASONE SODIUM PHOSPHATE 12 MG: 3; 3 INJECTION, SUSPENSION INTRA-ARTICULAR; INTRALESIONAL; INTRAMUSCULAR; SOFT TISSUE at 16:43

## 2021-01-10 RX ADMIN — BETAMETHASONE SODIUM PHOSPHATE AND BETAMETHASONE ACETATE 12 MG: 3; 3 INJECTION, SUSPENSION INTRA-ARTICULAR; INTRALESIONAL; INTRAMUSCULAR; SOFT TISSUE at 16:43

## 2021-01-10 NOTE — PLAN OF CARE
Pt presents ambulatory and unaccompanied for a scheduled second dose of betamethasone. Pt is a  34w3d of Dr. Mesa who was in [facility] yesterday for a r/o PTL. She was discharged to home and directed to return to [facility] for a second dose today at 1630.     Pt reports +FM and some uterine activity today but less intensity and frequency than when she presented here yesterday. She denies pelvic pressure, low back pain, VB, LOF, s/s of preeclampsia, dysuria or new illness.     IM Betamethasone injection administered with pt consent. Pt tolerated well.Pt is to follow up as scheduled in the clinic. Discharge instructions re-reviewed with pt and she verbalized understanding and agreement. Pt discharged ambulatory to home.

## 2021-01-10 NOTE — DISCHARGE INSTRUCTIONS
Discharge Instruction for Undelivered Patients      You were seen for: Labor Assessment  We Consulted: Dr. Mesa  You had (Test or Medicine): Betamethasone, UA, non stress test, cervical exams.     Diet:   Drink 8 to 12 glasses of liquids (milk, juice, water) every day.  You may eat meals and snacks.     Activity:  Call your doctor or nurse midwife if your baby is moving less than usual.     Call your provider if you notice:  Swelling in your face or increased swelling in your hands or legs.  Headaches that are not relieved by Tylenol (acetaminophen).  Changes in your vision (blurring: seeing spots or stars.)  Nausea (sick to your stomach) and vomiting (throwing up).   Weight gain of 5 pounds or more per week.  Heartburn that doesn't go away.  Signs of bladder infection: pain when you urinate (use the toilet), need to go more often and more urgently.  The bag of diaz (rupture of membranes) breaks, or you notice leaking in your underwear.  Bright red blood in your underwear.  Abdominal (lower belly) or stomach pain.  Second (plus) baby: Contractions (tightening) less than 10 minutes apart and getting stronger.    Follow-up:  Follow up in the [facility] tomorrow, January 10th, at 4:30pm for your second dose of betamethasone.  Make an appointment to be seen on Monday, January 11th, or Wednesday, January 13th.

## 2021-01-21 LAB — GROUP B STREP PCR: NORMAL

## 2021-02-03 ENCOUNTER — ANESTHESIA EVENT (OUTPATIENT)
Dept: OBGYN | Facility: CLINIC | Age: 26
End: 2021-02-03

## 2021-02-03 ENCOUNTER — HOSPITAL ENCOUNTER (INPATIENT)
Facility: CLINIC | Age: 26
LOS: 2 days | Discharge: HOME | End: 2021-02-05
Attending: SPECIALIST | Admitting: SPECIALIST
Payer: COMMERCIAL

## 2021-02-03 ENCOUNTER — ANESTHESIA (OUTPATIENT)
Dept: OBGYN | Facility: CLINIC | Age: 26
End: 2021-02-03

## 2021-02-03 LAB
LABORATORY COMMENT REPORT: NORMAL
SARS-COV-2 RNA RESP QL NAA+PROBE: NEGATIVE
SPECIMEN SOURCE: NORMAL

## 2021-02-03 PROCEDURE — 87635 SARS-COV-2 COVID-19 AMP PRB: CPT | Performed by: SPECIALIST

## 2021-02-03 PROCEDURE — C9803 HOPD COVID-19 SPEC COLLECT: HCPCS

## 2021-02-03 PROCEDURE — 59025 FETAL NON-STRESS TEST: CPT

## 2021-02-03 PROCEDURE — 120N000012 HC R&B POSTPARTUM

## 2021-02-03 PROCEDURE — G0463 HOSPITAL OUTPT CLINIC VISIT: HCPCS | Mod: 25

## 2021-02-03 PROCEDURE — 86900 BLOOD TYPING SEROLOGIC ABO: CPT | Performed by: SPECIALIST

## 2021-02-03 PROCEDURE — 250N000013 HC RX MED GY IP 250 OP 250 PS 637: Performed by: SPECIALIST

## 2021-02-03 PROCEDURE — 250N000009 HC RX 250: Performed by: SPECIALIST

## 2021-02-03 PROCEDURE — 10907ZC DRAINAGE OF AMNIOTIC FLUID, THERAPEUTIC FROM PRODUCTS OF CONCEPTION, VIA NATURAL OR ARTIFICIAL OPENING: ICD-10-PCS | Performed by: SPECIALIST

## 2021-02-03 PROCEDURE — 0KQM0ZZ REPAIR PERINEUM MUSCLE, OPEN APPROACH: ICD-10-PCS | Performed by: SPECIALIST

## 2021-02-03 PROCEDURE — 258N000003 HC RX IP 258 OP 636: Performed by: SPECIALIST

## 2021-02-03 PROCEDURE — 722N000001 HC LABOR CARE VAGINAL DELIVERY SINGLE

## 2021-02-03 RX ORDER — OXYTOCIN 10 [USP'U]/ML
10 INJECTION, SOLUTION INTRAMUSCULAR; INTRAVENOUS
Status: DISCONTINUED | OUTPATIENT
Start: 2021-02-03 | End: 2021-02-03

## 2021-02-03 RX ORDER — ONDANSETRON 2 MG/ML
4 INJECTION INTRAMUSCULAR; INTRAVENOUS EVERY 6 HOURS PRN
Status: DISCONTINUED | OUTPATIENT
Start: 2021-02-03 | End: 2021-02-03

## 2021-02-03 RX ORDER — DOCUSATE SODIUM 100 MG/1
100 CAPSULE, LIQUID FILLED ORAL 2 TIMES DAILY
Status: DISCONTINUED | OUTPATIENT
Start: 2021-02-03 | End: 2021-02-05 | Stop reason: HOSPADM

## 2021-02-03 RX ORDER — MISOPROSTOL 200 UG/1
800 TABLET ORAL
Status: DISCONTINUED | OUTPATIENT
Start: 2021-02-03 | End: 2021-02-05 | Stop reason: HOSPADM

## 2021-02-03 RX ORDER — ACETAMINOPHEN 325 MG/1
650 TABLET ORAL EVERY 4 HOURS PRN
Status: DISCONTINUED | OUTPATIENT
Start: 2021-02-03 | End: 2021-02-03

## 2021-02-03 RX ORDER — IBUPROFEN 400 MG/1
800 TABLET, FILM COATED ORAL
Status: DISCONTINUED | OUTPATIENT
Start: 2021-02-03 | End: 2021-02-03

## 2021-02-03 RX ORDER — OXYCODONE AND ACETAMINOPHEN 5; 325 MG/1; MG/1
1 TABLET ORAL
Status: DISCONTINUED | OUTPATIENT
Start: 2021-02-03 | End: 2021-02-03

## 2021-02-03 RX ORDER — METHYLERGONOVINE MALEATE 0.2 MG/ML
200 INJECTION INTRAVENOUS
Status: DISCONTINUED | OUTPATIENT
Start: 2021-02-03 | End: 2021-02-05 | Stop reason: HOSPADM

## 2021-02-03 RX ORDER — IBUPROFEN 400 MG/1
800 TABLET, FILM COATED ORAL EVERY 6 HOURS PRN
Status: DISCONTINUED | OUTPATIENT
Start: 2021-02-03 | End: 2021-02-05 | Stop reason: HOSPADM

## 2021-02-03 RX ORDER — ACETAMINOPHEN 325 MG/1
650 TABLET ORAL EVERY 4 HOURS PRN
Status: DISCONTINUED | OUTPATIENT
Start: 2021-02-03 | End: 2021-02-05 | Stop reason: HOSPADM

## 2021-02-03 RX ORDER — BISACODYL 10 MG
10 SUPPOSITORY, RECTAL RECTAL DAILY PRN
Status: DISCONTINUED | OUTPATIENT
Start: 2021-02-05 | End: 2021-02-05 | Stop reason: HOSPADM

## 2021-02-03 RX ORDER — ONDANSETRON 4 MG/1
4 TABLET, ORALLY DISINTEGRATING ORAL EVERY 6 HOURS PRN
Status: DISCONTINUED | OUTPATIENT
Start: 2021-02-03 | End: 2021-02-03

## 2021-02-03 RX ORDER — NALBUPHINE HYDROCHLORIDE 10 MG/ML
2.5-5 INJECTION, SOLUTION INTRAMUSCULAR; INTRAVENOUS; SUBCUTANEOUS EVERY 6 HOURS PRN
Status: DISCONTINUED | OUTPATIENT
Start: 2021-02-03 | End: 2021-02-03

## 2021-02-03 RX ORDER — OXYTOCIN/0.9 % SODIUM CHLORIDE 30/500 ML
100-340 PLASTIC BAG, INJECTION (ML) INTRAVENOUS CONTINUOUS PRN
Status: COMPLETED | OUTPATIENT
Start: 2021-02-03 | End: 2021-02-03

## 2021-02-03 RX ORDER — ROPIVACAINE HYDROCHLORIDE 2 MG/ML
10 INJECTION, SOLUTION EPIDURAL; INFILTRATION; PERINEURAL ONCE
Status: DISCONTINUED | OUTPATIENT
Start: 2021-02-03 | End: 2021-02-03

## 2021-02-03 RX ORDER — MODIFIED LANOLIN
OINTMENT (GRAM) TOPICAL
Status: DISCONTINUED | OUTPATIENT
Start: 2021-02-03 | End: 2021-02-05 | Stop reason: HOSPADM

## 2021-02-03 RX ORDER — OXYTOCIN/0.9 % SODIUM CHLORIDE 30/500 ML
340 PLASTIC BAG, INJECTION (ML) INTRAVENOUS CONTINUOUS PRN
Status: DISCONTINUED | OUTPATIENT
Start: 2021-02-03 | End: 2021-02-05 | Stop reason: HOSPADM

## 2021-02-03 RX ORDER — NALOXONE HYDROCHLORIDE 0.4 MG/ML
0.4 INJECTION, SOLUTION INTRAMUSCULAR; INTRAVENOUS; SUBCUTANEOUS
Status: DISCONTINUED | OUTPATIENT
Start: 2021-02-03 | End: 2021-02-03

## 2021-02-03 RX ORDER — TRANEXAMIC ACID 10 MG/ML
1 INJECTION, SOLUTION INTRAVENOUS EVERY 30 MIN PRN
Status: DISCONTINUED | OUTPATIENT
Start: 2021-02-03 | End: 2021-02-03

## 2021-02-03 RX ORDER — OXYTOCIN 10 [USP'U]/ML
10 INJECTION, SOLUTION INTRAMUSCULAR; INTRAVENOUS
Status: DISCONTINUED | OUTPATIENT
Start: 2021-02-03 | End: 2021-02-05 | Stop reason: HOSPADM

## 2021-02-03 RX ORDER — TRANEXAMIC ACID 10 MG/ML
1 INJECTION, SOLUTION INTRAVENOUS EVERY 30 MIN PRN
Status: DISCONTINUED | OUTPATIENT
Start: 2021-02-03 | End: 2021-02-05 | Stop reason: HOSPADM

## 2021-02-03 RX ORDER — NALOXONE HYDROCHLORIDE 0.4 MG/ML
0.2 INJECTION, SOLUTION INTRAMUSCULAR; INTRAVENOUS; SUBCUTANEOUS
Status: DISCONTINUED | OUTPATIENT
Start: 2021-02-03 | End: 2021-02-03

## 2021-02-03 RX ORDER — METHYLERGONOVINE MALEATE 0.2 MG/ML
200 INJECTION INTRAVENOUS
Status: DISCONTINUED | OUTPATIENT
Start: 2021-02-03 | End: 2021-02-03

## 2021-02-03 RX ORDER — EPHEDRINE SULFATE 50 MG/ML
5 INJECTION, SOLUTION INTRAMUSCULAR; INTRAVENOUS; SUBCUTANEOUS
Status: DISCONTINUED | OUTPATIENT
Start: 2021-02-03 | End: 2021-02-03

## 2021-02-03 RX ORDER — CARBOPROST TROMETHAMINE 250 UG/ML
250 INJECTION, SOLUTION INTRAMUSCULAR
Status: DISCONTINUED | OUTPATIENT
Start: 2021-02-03 | End: 2021-02-03

## 2021-02-03 RX ORDER — HYDROCORTISONE 2.5 %
CREAM (GRAM) TOPICAL 3 TIMES DAILY PRN
Status: DISCONTINUED | OUTPATIENT
Start: 2021-02-03 | End: 2021-02-05 | Stop reason: HOSPADM

## 2021-02-03 RX ORDER — OXYTOCIN/0.9 % SODIUM CHLORIDE 30/500 ML
100 PLASTIC BAG, INJECTION (ML) INTRAVENOUS CONTINUOUS
Status: DISCONTINUED | OUTPATIENT
Start: 2021-02-03 | End: 2021-02-05 | Stop reason: HOSPADM

## 2021-02-03 RX ORDER — SODIUM CHLORIDE, SODIUM LACTATE, POTASSIUM CHLORIDE, CALCIUM CHLORIDE 600; 310; 30; 20 MG/100ML; MG/100ML; MG/100ML; MG/100ML
INJECTION, SOLUTION INTRAVENOUS CONTINUOUS
Status: DISCONTINUED | OUTPATIENT
Start: 2021-02-03 | End: 2021-02-03

## 2021-02-03 RX ADMIN — LIDOCAINE HYDROCHLORIDE 20 ML: 10 INJECTION, SOLUTION INFILTRATION; PERINEURAL at 19:56

## 2021-02-03 RX ADMIN — Medication 340 ML/HR: at 19:55

## 2021-02-03 RX ADMIN — SODIUM CHLORIDE, POTASSIUM CHLORIDE, SODIUM LACTATE AND CALCIUM CHLORIDE 1000 ML: 600; 310; 30; 20 INJECTION, SOLUTION INTRAVENOUS at 19:22

## 2021-02-03 RX ADMIN — SODIUM CHLORIDE, POTASSIUM CHLORIDE, SODIUM LACTATE AND CALCIUM CHLORIDE: 600; 310; 30; 20 INJECTION, SOLUTION INTRAVENOUS at 17:54

## 2021-02-03 RX ADMIN — ACETAMINOPHEN 650 MG: 325 TABLET, FILM COATED ORAL at 21:57

## 2021-02-03 ASSESSMENT — LIFESTYLE VARIABLES: TOBACCO_USE: 0

## 2021-02-03 ASSESSMENT — ENCOUNTER SYMPTOMS: SEIZURES: 0

## 2021-02-03 NOTE — PLAN OF CARE
1635-Melina is a 26yo  37w6d presents with uncomfortable cxns and bleeding. Soaked a pad on her way into the hospital and had blood on her perineum with a clot. Was seen in clinic today and had her membranes swept. Was 4cm/80%. Had bleeding with her first baby like this as well. Hemorrhaged but didn't need a blood transfusion. States hemorrhage occurred prior to delivery, not afterwards. Can't tell for sure if her water broke but doesn't think it did. +FM. Expecting a boy, plans to breastfeed, [facility] peds.     1640-SVE 5-6cm/80%    1650-Dr Bar updated with pt's SVE, bleeding, cxns, pain, etc. Plan to admit, start IV, will come in to assess.     Pt agrees with POC. Report given to ELVIRA Daily to assume all cares. Pt transferred to L/D room 220 ambulatory at 1707.

## 2021-02-03 NOTE — H&P
"  February 3, 2021    Melina Palacios  [id number]            OB Admit History & Physical      Ms. Palacios  is here in labor.    She has noticed bleeding and regular uncomfortable contractions    Patient's last menstrual period was 2020.   Her Estimated Date of Delivery: 2021  , making her 37w6d  wks.      Estimated body mass index is 22.05 kg/m  as calculated from the following:    Height as of 20: 1.727 m (5' 8\").    Weight as of 20: 65.8 kg (145 lb).  Her prenatal course has been  complicated by anxiety, placenta previa-resolved.    See prenatal for labs.  - GBBS, Rubella  Immune, RH +    Estimated fetal weight= 7#       She is a 25 year old   Her OB history:   OB History    Para Term  AB Living   2 1 1 0 0 1   SAB TAB Ectopic Multiple Live Births   0 0 0 0 1      # Outcome Date GA Lbr Allan/2nd Weight Sex Delivery Anes PTL Lv   2 Current            1 Term 19 40w2d 04:20 / 03:53 3.827 kg (8 lb 7 oz) M Vag-Vacuum EPI N ALEA      Birth Comments: followed by [facility] and delivered by [facility]. PROM, refused pit all day. finally agreed and got 2 mu and rapid 1st stage. pushed 3+ hours and then refuse and wanted a c/s. consulted and did vavd x2 pulls. compound pres. 3rd degree, left sulcus/right labial t      Name: Hira      Apgar1: 8  Apgar5: 9            Past Medical History:   Diagnosis Date     Depressive disorder      NY (generalized anxiety disorder)      IBS (irritable bowel syndrome)      Other immediate postpartum hemorrhage 2019     Ovarian cyst      Third degree laceration of perineum during delivery, postpartum 2019     Vacuum extractor delivery, delivered 2019          Past Surgical History:   Procedure Laterality Date     ENT SURGERY      wisdom teeth extraction      wisdom teeth           No current outpatient medications on file.       Allergies: Azithromycin, Penicillins, and Amoxicillin      REVIEW OF SYSTEMS:  NEUROLOGIC:  Negative  EYES:  " Negative  ENT:  Negative  GI:  Negative  BREAST:  Negative  :  Negative  GYN:  Negative  CV:  Negative  PULMONARY:  Negative  MUSCULOSKELETAL:  Negative  PSYCH:  Negative        Social History     Socioeconomic History     Marital status:      Spouse name: Not on file     Number of children: Not on file     Years of education: Not on file     Highest education level: Not on file   Occupational History     Not on file   Social Needs     Financial resource strain: Not on file     Food insecurity     Worry: Not on file     Inability: Not on file     Transportation needs     Medical: Not on file     Non-medical: Not on file   Tobacco Use     Smoking status: Never Smoker     Smokeless tobacco: Never Used   Substance and Sexual Activity     Alcohol use: No     Frequency: Never     Binge frequency: Never     Drug use: No     Sexual activity: Yes     Partners: Male     Birth control/protection: None   Lifestyle     Physical activity     Days per week: Not on file     Minutes per session: Not on file     Stress: Not on file   Relationships     Social connections     Talks on phone: Not on file     Gets together: Not on file     Attends Zoroastrianism service: Not on file     Active member of club or organization: Not on file     Attends meetings of clubs or organizations: Not on file     Relationship status: Not on file     Intimate partner violence     Fear of current or ex partner: Not on file     Emotionally abused: Not on file     Physically abused: Not on file     Forced sexual activity: Not on file   Other Topics Concern     Parent/sibling w/ CABG, MI or angioplasty before 65F 55M? Not Asked   Social History Narrative     Not on file      Family History   Problem Relation Age of Onset     Cancer Mother      Breast Cancer Mother      Cancer Maternal Aunt      Breast Cancer Maternal Aunt      Thyroid Disease Maternal Grandmother      Breast Cancer Paternal Grandmother      No Known Problems Father      No Known  Problems Sister      No Known Problems Brother      No Known Problems Maternal Grandfather      No Known Problems Other              Vitals:   FHT reactive  With contractions every  2-3 min    Alert Awake in NAD  HEENT grossly normal  Neck: no lymphadenopathy or thryoidomegaly  Lungs clear  Back no spinal or CVAT  Heart RRR  ABD gravid, vertex on exam   Pelvic:  no fluid noted,  blood noted  Cervix is 5-6 cm / 80 % effaced at 0 station  EXT:  no edema or calf tenderness  Neuro:  intact    Assessment:  IUP at 37w6d    Increased bleeding  Anxiety  H/O VAVD with 3rd degree laceration    Plan:  Anticipate   Analgesia as desired    [unfilled]      EM REYNOSO MD MD  Dept of OB/GYN  February 3, 2021

## 2021-02-04 PROCEDURE — 250N000013 HC RX MED GY IP 250 OP 250 PS 637: Performed by: SPECIALIST

## 2021-02-04 PROCEDURE — 120N000012 HC R&B POSTPARTUM

## 2021-02-04 RX ADMIN — ACETAMINOPHEN 650 MG: 325 TABLET, FILM COATED ORAL at 03:04

## 2021-02-04 RX ADMIN — DOCUSATE SODIUM 100 MG: 100 CAPSULE, LIQUID FILLED ORAL at 21:48

## 2021-02-04 RX ADMIN — DOCUSATE SODIUM 100 MG: 100 CAPSULE, LIQUID FILLED ORAL at 11:35

## 2021-02-04 RX ADMIN — ACETAMINOPHEN 650 MG: 325 TABLET, FILM COATED ORAL at 07:00

## 2021-02-04 RX ADMIN — ACETAMINOPHEN 650 MG: 325 TABLET, FILM COATED ORAL at 21:48

## 2021-02-04 NOTE — LACTATION NOTE
Follow up Lactation visit with Melina & baby boy. Melina expressed frustation that baby is not latching well on right side. On assessment, her left nipple has a visible blister on it. Encouraged using hydrogels after feeds. Hydrogels given. Reviewed use, including but not limited to: using hydrogels or nipple ointment and avoiding concurrent use. Encouraged good hand hygeine. Encouraged wiping nipples prior to feeding with clean cloth. Reviewed signs of further nipple damage or infection and encouraged lactation follow up as needed.     Melina shared she had a history of low milk supply with her last baby. She also shared she used a small shield with that baby for latching. Does not recall damaged nipples.    Melina had requested a nipple shield from primary RN. Primary RN assisting with most feedings to attempt to get deep comfortable latches. Melina felt baby was not doing well with 24mm nipple shield and she was requesting a small nipple shield. Reviewed rationale for avoiding small nipple shield use with term baby, especially due to her already damaged nipples. Melian stated she'd prefer not to use shield at all if not using small shield. LC offered assist to get baby to latch. Placed in cross cradle hold at right breast and with LC assist, baby latched easily, needing lower lip rolled down and out to get a deep latch, but after adjustment latched well. Melina stated latch felt good after adjustment. Reviewed early breastfeeding behaviors. Encouraged asking for help if having difficulty getting a deep latch on her own. Encouraged continued latch assist, feeding on alternating sides, and encouraged using hydrogels after feedings.    Feeding plan: Recommend unlimited, frequent breast feedings: At least 8 - 12 times every 24 hours. Avoid pacifiers and supplementation with formula unless medically indicated. Encouraged use of feeding log and to record feedings, and void/stool patterns. Melina has a pump for home  use.  Encouraged to call with needs, will revisit as needed.      Judith Servin, RN-C, IBCLC, MNN, PHN, BSN

## 2021-02-04 NOTE — PROGRESS NOTES
Postpartum Day 1: Vaginal delivery    Active Problems:    Labor and delivery, indication for care      Subjective:  Patient reports doing well this morning. Pain controlled.  Ambulating and voiding without issue.  Tolerating regular diet without N/V.  No fever, chills, chest pain, shortness of breath.  Breast feeding is going okay, does get uterine cramping with nursing. Rubra is light    Objective:  Patient Vitals for the past 12 hrs:   BP Temp Temp src Pulse Resp   21 0815 109/64 98.2  F (36.8  C) Oral 81 16   21 0239 115/65 97.9  F (36.6  C) Oral 50 16   21 2253 122/77 98.7  F (37.1  C) Oral 86 16   21 2155 131/66 -- -- -- --   21 2135 120/73 -- -- -- --   21 2118 110/62 -- -- -- --       Recent Labs   Lab Test 20  1039 10/07/20  0319   HGB 12.0 13.0        Gen: NAD, lying in bed  Abd: soft, minimally tender, uterus intermittently boggy with palpation, improved with massage  Extrem: No MENA bilat, non-tender    Assessment/Plan: 25 year old  postpartum day #1, recovering well  -Routine postpartum care  -Anticipate discharge home tomorrow morning  -Continue to monitor bleeding, uterine firmness  -HGB pending    Clarisa Fox PA-C on 2021 at 9:12 AM

## 2021-02-04 NOTE — PLAN OF CARE
VSS, assessment WNL.  FF after massage U/1, light rubra noted.  Second degree laceration; ice applied.  Pt voiding without difficulties.  Pt breastfeeding, had difficulty with second feeding - infant would not wake.  Next feeding better, and pt reports cramping with breastfeeding.  Tylenol for pain management, patient declines ibuprofen.  Pt has flat affect.  Very little sleep this night.   present and supportive.

## 2021-02-04 NOTE — PROGRESS NOTES
"Coping well with contractions  /79   Temp 98.5  F (36.9  C) (Temporal)   Resp 16   LMP 05/14/2020   Ctxs every 2-3\"  FHR moderate variability  Cx 6/90/-2, not very well-applied  AROM-clear fluid  "

## 2021-02-04 NOTE — L&D DELIVERY NOTE
Patient is a 26 yo  at 37+6 weeks EGA who presented with regular contractions and vaginal bleeding. Had bleeding earlier this pregnancy and a low-lying placenta that resolved.  Pregnancy also complicated by anxiety.   POBHx significant for VAVD with 3rd degree laceration and a failed epidural  On presentation, her cervix was 4-5/90/-2. AROM was done after about an hour with return of clear fluid. She progressed spontaneously and requested an epidural. Just before anesthesia came, she felt the urge to push   male, Apgars 8 & 9, wt NA, from OA vertex while in knee chest with a tight nuchal cord that was cut on the perineum. There was a small second degree laceration (that was repaired with 3.0 Vicryl) with a 1 degree hockey stick to the right that didn't require repair.  Placenta spont, intact, 3 CV.  EBL at delivery 125 ml  Complications-none  Mother and baby doing well.

## 2021-02-04 NOTE — PLAN OF CARE
VSS, tolerating regular diet, and voiding without difficulty. FF after massage. Pain controlled well with PRN tylenol and ibuprofen. Baby breastfeeding well throughout shift; assisting patient as able with getting infant latched.

## 2021-02-04 NOTE — ANESTHESIA PREPROCEDURE EVALUATION
Anesthesia Pre-Procedure Evaluation    Patient: Melina Palacios   MRN: [id number] : 1995        Preoperative Diagnosis: * No surgery found *   Procedure :      Past Medical History:   Diagnosis Date     Depressive disorder      [place] (generalized anxiety disorder)      IBS (irritable bowel syndrome)      Other immediate postpartum hemorrhage 2019     Ovarian cyst      Third degree laceration of perineum during delivery, postpartum 2019     Vacuum extractor delivery, delivered 2019      Past Surgical History:   Procedure Laterality Date     ENT SURGERY      wisdom teeth extraction      wisdom teeth        Allergies   Allergen Reactions     Azithromycin Nausea and Vomiting     In high school   Other reaction(s): GI Upset  Other reaction(s): Gastrointestinal  Other reaction(s): Vomiting     Penicillins Hives     Amoxicillin Hives and Rash     In high school       Social History     Tobacco Use     Smoking status: Never Smoker     Smokeless tobacco: Never Used   Substance Use Topics     Alcohol use: No     Frequency: Never     Binge frequency: Never      Wt Readings from Last 1 Encounters:   20 65.8 kg (145 lb)        Anesthesia Evaluation            ROS/MED HX  ENT/Pulmonary:    (-) tobacco use and sleep apnea   Neurologic:    (-) no seizures   Cardiovascular:    (-) hypertension   METS/Exercise Tolerance:     Hematologic:       Musculoskeletal:       GI/Hepatic:    (-) GERD and liver disease   Renal/Genitourinary:    (-) renal disease   Endo:    (-) Type II DM and thyroid disease   Psychiatric/Substance Use:     (+) psychiatric history depression and anxiety     Infectious Disease:       Malignancy:       Other:            Physical Exam    Airway        Mallampati: II   TM distance: > 3 FB   Neck ROM: full   Mouth opening: > 3 cm    Respiratory Devices and Support         Dental  no notable dental history         Cardiovascular   cardiovascular exam normal          Pulmonary   pulmonary  exam normal                OUTSIDE LABS:  CBC:   Lab Results   Component Value Date    WBC 10.3 12/02/2020    WBC 10.6 10/07/2020    HGB 12.0 12/02/2020    HGB 13.0 10/07/2020    HCT 36.6 12/02/2020    HCT 38.7 10/07/2020     12/02/2020     10/07/2020     BMP:   Lab Results   Component Value Date     07/06/2019     03/09/2019    POTASSIUM 3.9 07/06/2019    POTASSIUM 4.7 03/09/2019    CHLORIDE 108 07/06/2019    CHLORIDE 109 03/09/2019    CO2 24 07/06/2019    CO2 25 03/09/2019    BUN 5 (L) 07/06/2019    BUN 5 (L) 03/09/2019    CR 0.48 (L) 07/06/2019    CR 0.44 (L) 03/09/2019    GLC 88 07/06/2019    GLC 98 03/09/2019     COAGS: No results found for: PTT, INR, FIBR  POC: No results found for: BGM, HCG, HCGS  HEPATIC:   Lab Results   Component Value Date    ALBUMIN 2.7 (L) 07/06/2019    PROTTOTAL 6.9 07/06/2019    ALT 19 07/06/2019    AST 18 07/06/2019    ALKPHOS 203 (H) 07/06/2019    BILITOTAL 0.4 07/06/2019     OTHER:   Lab Results   Component Value Date    CESAR 9.0 07/06/2019    TSH 1.780 08/11/2016       Anesthesia Plan    ASA Status:  2      Anesthesia Type: Epidural              Consents    Anesthesia Plan(s) and associated risks, benefits, and realistic alternatives discussed. Questions answered and patient/representative(s) expressed understanding.     - Discussed with:  Patient         Postoperative Care            Comments:                Skip Flynn MD

## 2021-02-04 NOTE — PROVIDER NOTIFICATION
"GARETT Fox contacted at 1140 regarding patients refusal of hgb draw. Message left and awaiting call back.    2nd attempt made to contact Trista at [phone]. Message left with . Awaiting call back.    GARETT Weston called unit and talked with writer. Trista stated that she would like patient to have hgb drawn due to the fact that she did not have a pre-delivery level drawn. Patient notified of plan and was unwilling to accept plan stating that \"her (Trista) reason for wanting it done does not seem good enough to me.\" GARETT Weston notified of patients refusal and she is going to forward the issue to the Dr. On-call. Awaiting call back for further instructions.       "

## 2021-02-04 NOTE — PROGRESS NOTES
"Feeling some pressure with contractions. Wanting something for pain. Had problems last time with epidural not working but wants to try  /79   Temp 98.5  F (36.9  C) (Temporal)   Resp 16   LMP 05/14/2020   Ctxs every 2-3\"  FHR moderate variability  Cx 6/100/0  "

## 2021-02-04 NOTE — LACTATION NOTE
"Initial visit with Melina, FOB and infant. Melina states infant  well after delivery. OTs being checked for low temperature after delivery. Prior to this feed OT 64. Infant placed skin to skin with Melina. Infant sleepy and not wanting to latch. Melina plans to attempt feeding again by 0230 if infant doesn't wake to feed sooner. Melina states having a poor breastfeeding experience with her first babe including low supply. LC advised pumping after feeds. Melina is not interested in pumping after feeds.    Breastfeeding general information reviewed. Advised to breastfeed exclusively, on demand, avoid pacifiers, bottles and formula unless medically indicated.    Encouraged rooming in, skin to skin, feeding on demand 8-12x/day or sooner if baby cues.  Explained benefits of holding and skin to skin. Discussed typical feeding pattern for the next 24-48 hours.     Discussed typical onset of mature milk. Instructed on hand expression and when to start pumping and introducing a bottle if needed when returning to work.     Breastfeeding going well initially now infant sleepy per mother. Pt needs a prescription for pump for use at home at discharge. Encouraged to read \"A New Beginning\". Patient thankful for LC support and advise.    All questions answered. No further questions at this time. Will follow as needed.     JULIANA Vinson RN, BSN, PHN, IBCLC    "

## 2021-02-04 NOTE — PLAN OF CARE
Data: Patient admitted to room 220 at 1734. Patient is a . Prenatal record reviewed.   OB History    Para Term  AB Living   2 1 1 0 0 1   SAB TAB Ectopic Multiple Live Births   0 0 0 0 1      # Outcome Date GA Lbr Allan/2nd Weight Sex Delivery Anes PTL Lv   2 Current            1 Term 19 40w2d 04:20 / 03:53 3.827 kg (8 lb 7 oz) M Vag-Vacuum EPI N ALEA      Birth Comments: followed by [facility] and delivered by [facility]. PROM, refused pit all day. finally agreed and got 2 mu and rapid 1st stage. pushed 3+ hours and then refuse and wanted a c/s. consulted and did vavd x2 pulls. compound pres. 3rd degree, left sulcus/right labial t      Name: Hira      Apgar1: 8  Apgar5: 9   .  Medical History:   Past Medical History:   Diagnosis Date     Depressive disorder      NY (generalized anxiety disorder)      IBS (irritable bowel syndrome)      Other immediate postpartum hemorrhage 2019     Ovarian cyst      Third degree laceration of perineum during delivery, postpartum 2019     Vacuum extractor delivery, delivered 2019   .  Gestational age 37w6d. Vital signs per doc flowsheet. Fetal movement present. Patient reports Vaginal Bleeding   as reason for admission. Support persons Kevin present at 1800.  Action: Report from Gina Pickering RN obtained at 1720. Care of patient assumed at 1720. Verbal consent for EFM, external fetal monitors applied. Admission assessment completed. Patient and support persons educated on labor process. Patient instructed to report change in fetal movement, contractions, vaginal leaking of fluid or bleeding, abdominal pain, or any concerns related to the pregnancy to her nurse/physician. Patient oriented to room, call light in reach.   Response: Dr. Annika Bar informed of labor status. Plan per provider is AROM. Patient verbalized understanding of education and verbalized agreement with plan. Patient coping with labor via epidural.

## 2021-02-05 VITALS
TEMPERATURE: 97.9 F | HEART RATE: 65 BPM | DIASTOLIC BLOOD PRESSURE: 71 MMHG | RESPIRATION RATE: 14 BRPM | SYSTOLIC BLOOD PRESSURE: 109 MMHG

## 2021-02-05 PROCEDURE — 250N000013 HC RX MED GY IP 250 OP 250 PS 637: Performed by: SPECIALIST

## 2021-02-05 RX ADMIN — DOCUSATE SODIUM 100 MG: 100 CAPSULE, LIQUID FILLED ORAL at 07:32

## 2021-02-05 RX ADMIN — ACETAMINOPHEN 650 MG: 325 TABLET, FILM COATED ORAL at 07:32

## 2021-02-05 NOTE — CONSULTS
"SWS Progress Note    Data: SW consult for postpartum assessment due to score of 11 on the EPDS. Pt is a 24yo who delivered her baby, boy, on 02/03/21 at 37w6d. Parents have one other child.  Intervention: SW has reviewed pt records. SW met with pt this morning to introduce self/role, perform assessment, and discuss resources. SW inquired about pt mental health history, pt shared she has a history of mental health while she is pregnant. Pt has not experience with postpartum depression/anxiety. SW normalized \"rollercoaster\" of emotions that may occur following delivery as well as discussed s/s that may be a concern for potential PPD/PPA. Pt has insight on the s/s to look for, SW discussed techniques for coping and the importance of family awareness and support. SW also encouraged pt to alert her MD of any concerns at her follow-up appointment. SW discussed PPD/PPA resource folder and provided brief overview of information included. Pt appreciative of the resources. Pt feels prepared for discharge and has no additional questions/concerns.  Assessment: Pt pleasant and welcoming of SW involvement. Pt observed to have calm affect during conversation. SW allowed space for pt to share thoughts/concerns re: PPD/PPA. SW provided reflective listening and supportive counseling. MOB bonding well with baby, no concerns.  Plan: No further SW follow-up indicated. Pt and baby to discharge today with above mentioned resources.     Shefali Kern, LUCY     [facility]      "

## 2021-02-05 NOTE — PLAN OF CARE
VSS. Fundus firm and midline. Scant flow. Pain 0/10, no meds currently. Breastfeeding. Scored 11 on [place] Post Walter Depression Scale, social work consult ordered. Ambulating free of dizziness or lightheaded. Voiding without difficulty. Encouraged to call with needs, questions, or concerns. Will continue to monitor.

## 2021-02-05 NOTE — PLAN OF CARE
.D: VSS, assessments WDL.   I: Pt. received complete discharge paperwork.  Pt. was given times of last dose for all discharge medications in writing on discharge medication sheets.  Discharge teaching included home medication, pain management, activity restrictions, postpartum cares, and signs and symptoms of infection.    A: Discharge outcomes on care plan met.  Mother states understanding and comfort with self and baby cares.  P: Pt. discharged to home.  Pt. was discharged with baby, and bands were checked at time of discharge.  Pt. was accompanied by , nurse and baby, and left with personal belongings.   Pt. to follow up with OB per MD order.  Pt. had no further questions at the time of discharge and no unmet needs were identified.

## 2021-02-05 NOTE — DISCHARGE SUMMARY
OB VAGINAL BIRTH DISCHARGE SUMMARY    ADMISSION DATE:  2/3/2021  DISCHARGE DATE:  2021    HOSPITAL COURSE / PROCEDURE INFORMATION:  Melina Palacios is a 25 year old  ->  who presented in labor.    She had a spontaneous vaginal delivery of a male infant weighing 2.89 kg (6lbs 5.9oz).  Apgars were 8 and 9 at 1 and 5 minute respectively.  Postpartum course was uncomplicated.    PRINCIPAL DIAGNOSIS:  Patient Active Problem List   Diagnosis     Situational anxiety     Irritable bowel syndrome with diarrhea     Supervision of high-risk pregnancy     Labor and delivery, indication for care     NY (generalized anxiety disorder)     Depressive disorder     Vaginal delivery        POST-PARTUM COMPLICATIONS:  None    CONTRACEPTION:  To be discussed at Postpartum Visit    Important pending test results:  None    DISCHARGE PLAN:  Melina Palacios is a 25 year old  female who will be discharged home in good condition.          Review of your medicines      CONTINUE these medicines which have NOT CHANGED      Dose / Directions   PNV-DHA PO      Refills: 0          Discharge Procedure Orders   Activity   Order Comments: Review discharge instructions     Order Specific Question Answer Comments   Is discharge order? Yes      Reason for your hospital stay   Order Comments: Maternity care     Discharge Instructions - Postpartum visit   Order Comments: Schedule postpartum visit with your provider and return to clinic in 6 weeks.    Pain management:  -Ibuprofen 600 mg every 6 hours as needed  -Tylenol 1000 mg every 6 hours as needed (no more than 4000 mg in 24 hours)     Diet   Order Comments: Resume previous diet     Order Specific Question Answer Comments   Is discharge order? Yes           Keysha Guzmán MD  2021, 9:18 AM

## 2021-02-05 NOTE — PROGRESS NOTES
Postpartum Day 2: Vaginal delivery    Principal Problem:    Vaginal delivery  Active Problems:    Labor and delivery, indication for care    NY (generalized anxiety disorder)    Depressive disorder      Subjective:  Patient reports doing well this morning. Pain controlled.  Ambulating and voiding without issue.  Tolerating regular diet without N/V.  No fever, chills, chest pain, shortness of breath.  Breastfeeding going well. Decreasing lochia.    Objective:  Patient Vitals for the past 12 hrs:   BP Temp Temp src Pulse Resp   21 0725 109/71 97.9  F (36.6  C) Oral 65 14   21 0245 115/74 98.2  F (36.8  C) Oral 87 16       Recent Labs   Lab Test 20  1039 10/07/20  0319   HGB 12.0 13.0       Gen: NAD, sitting in bed breastfeeding  Abd: soft, minimally tender, firm fundus at umbilicus  Extrem: trace MENA bilat, non-tender      Assessment/Plan: 25 year old  postpartum day #2, recovering well  -routine postpartum care  discharge home today    Keysha Guzmán MD  2021   Pager: [phone]

## 2021-10-10 ENCOUNTER — HEALTH MAINTENANCE LETTER (OUTPATIENT)
Age: 26
End: 2021-10-10

## 2021-10-27 NOTE — TELEPHONE ENCOUNTER
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  Wondering if there are any fumes that may have been emitted ?  Unable to find any date in Up to Date, advised no noted cause for concern but for piece of mind, did transfer to Poison Control.      Additional Information    HARMLESS SUBSTANCE (non-poisonous) ingestion (all triage questions negative)     Not ingested, concerned about \"fumes\".    Protocols used: POISONING-ADULT-      " no

## 2021-10-31 ENCOUNTER — HOSPITAL ENCOUNTER (EMERGENCY)
Facility: CLINIC | Age: 26
Discharge: HOME | End: 2021-10-31
Attending: PHYSICIAN ASSISTANT | Admitting: PHYSICIAN ASSISTANT
Payer: COMMERCIAL

## 2021-10-31 ENCOUNTER — NURSE TRIAGE (OUTPATIENT)
Dept: NURSING | Facility: CLINIC | Age: 26
End: 2021-10-31

## 2021-10-31 VITALS
WEIGHT: 138 LBS | HEART RATE: 78 BPM | HEIGHT: 68 IN | SYSTOLIC BLOOD PRESSURE: 139 MMHG | TEMPERATURE: 97.9 F | RESPIRATION RATE: 16 BRPM | BODY MASS INDEX: 20.92 KG/M2 | DIASTOLIC BLOOD PRESSURE: 74 MMHG | OXYGEN SATURATION: 100 %

## 2021-10-31 DIAGNOSIS — Z20.9 EXPOSURE TO BAT WITHOUT KNOWN BITE: ICD-10-CM

## 2021-10-31 PROCEDURE — 99284 EMERGENCY DEPT VISIT MOD MDM: CPT | Mod: 25

## 2021-10-31 PROCEDURE — 90375 RABIES IG IM/SC: CPT | Performed by: PHYSICIAN ASSISTANT

## 2021-10-31 PROCEDURE — 90471 IMMUNIZATION ADMIN: CPT | Performed by: PHYSICIAN ASSISTANT

## 2021-10-31 PROCEDURE — 96372 THER/PROPH/DIAG INJ SC/IM: CPT | Performed by: PHYSICIAN ASSISTANT

## 2021-10-31 PROCEDURE — 250N000011 HC RX IP 250 OP 636: Performed by: PHYSICIAN ASSISTANT

## 2021-10-31 PROCEDURE — 90675 RABIES VACCINE IM: CPT | Performed by: PHYSICIAN ASSISTANT

## 2021-10-31 RX ADMIN — Medication 1 ML: at 21:58

## 2021-10-31 RX ADMIN — RABIES IMMUNE GLOBULIN (HUMAN) 1200 UNITS: 300 INJECTION, SOLUTION INFILTRATION; INTRAMUSCULAR at 21:56

## 2021-10-31 ASSESSMENT — MIFFLIN-ST. JEOR: SCORE: 1414.46

## 2021-10-31 ASSESSMENT — ENCOUNTER SYMPTOMS: WOUND: 0

## 2021-11-01 ENCOUNTER — TELEPHONE (OUTPATIENT)
Dept: FAMILY MEDICINE | Facility: CLINIC | Age: 26
End: 2021-11-01

## 2021-11-01 NOTE — TELEPHONE ENCOUNTER
Seen in ER for possible bat exposure 10/31/21 - had first rabies vaccine in the Er and told to schedule at any [place] for the rest of the series    Needs 2 nd rabies vaccine due on Wednesday and wants this completed at [place]. See Er note    Patient is very frustrated as she has been transferred many times and cannot get an answer or help with this. I apologized to patient- she is very upset. I advised I would try to help and call her back hopefully within the hour      Thank You,    Lina FLETCHERRN Triage  [facility]  [phone]

## 2021-11-01 NOTE — ED PROVIDER NOTES
"  History   Chief Complaint:  Bat exposure     The history is provided by the patient.      Melina Palacios is a 25 year old female with history of [place] who presents with a possible bat exposure. Patient states she was [place] of [place] in the bush carrying her baby and noticed a bat fly by her chest level to her left side. Patient thinks it might be a bat since it was smaller than a bird. She is unsure of any bite marks as she has dry cracked hands. She is here with her child and is concerned whether he needs vaccines.       Review of Systems   Skin: Negative for wound.   All other systems reviewed and are negative.        Allergies:  Azithromycin  Penicillins  Amoxicillin    Medications:  The patient is currently on no regular medications.    Past Medical History:    Ovarian cyst  [place]  Depression   IBS    Past Surgical History:    [place] teeth extraction     Family History:    Mother: cancer,     Social History:  Patient presents to the ED with  and children.     Physical Exam     Patient Vitals for the past 24 hrs:   BP Temp Temp src Pulse Resp SpO2 Height Weight   10/31/21 2021 139/74 97.9  F (36.6  C) Temporal 78 16 100 % 1.727 m (5' 8\") 62.6 kg (138 lb)       Physical Exam  General: Well appearing, pleasant female, resting on exam bed  HEENT: No evidence of trauma.  Conjunctive are clear. Neck range of motion intact.  Nose and throat clear.  Respiratory: Good effort  Cardiovascular: Good distal perfusion  Gastrointestinal: Nondistended  Musculoskeletal: Atraumatic  Skin: Exposed skin clear.  Neurologic: Alert.  Psych:  Patient is cooperative, with normal affect.      Emergency Department Course       Emergency Department Course:    Reviewed:  I reviewed nursing notes, vitals, past medical history and care everywhere    Assessments:  2031 I obtained history and examined the patient as noted above.   2104 I updated the patient regarding her child's vaccines.     Consults:  2127 I spoke with Frances, "  at [facility], regarding whether patient needs the rabies vaccines. She states to administer the vaccines even if the bat only touched the patient.     Interventions:  2156 Hyperab 1200 units IM  2158 Rabavert 1 mL IM    Disposition:  The patient was discharged to home.       Impression & Plan     CMS Diagnoses: None    Medical Decision Making:  Melina Palacios is a 25 year old female presents emergency room today for evaluation after coming in contact with a bat in the backyard.  See HPI.  Her vitals are unremarkable.  She is unsure if she was touched or not.  No wounds per patient.  I consulted pharmacy and the [facility] on-call  Frances in deciding about rabies prophylaxis.  Recommended giving the patient the vaccine and immunoglobulin.  Patient is to follow-up with primary care to schedule subsequent doses.  Return with any concerns.    Diagnosis:    ICD-10-CM    1. Exposure to bat without known bite  Z20.9          Scribe Disclosure:  I, Kiera Kaur, am serving as a scribe at 8:31 PM on 10/31/2021 to document services personally performed by Ross Springer PA-C based on my observations and the provider's statements to me.            Ross Springer PA-C  10/31/21 7827

## 2021-11-01 NOTE — TELEPHONE ENCOUNTER
Kevin calling with patient in the background. They have questions about a potential bat bite for patient and their son. Patient was under a tree in the backyard when a bat flew into them. Patient has some marks on her hands but unsure of any bite marks as she has dry cracked hands.    protocol recommends ED now.   Care advice given.    will drive them to [facility].  Princess Tripp RN   10/31/21 7:40 PM  [facility] Nurse Advisor    Reason for Disposition    [1] Any break in skin from BITE (e.g., cut, puncture or scratch) AND[2] WILD animal at risk for RABIES (e.g., bat, raccoon, miramontes, skunk, coyote, other carnivores)    Additional Information    Negative: [1] Major bleeding (e.g., actively dripping or spurting) AND [2] can't be stopped    Negative: Sounds like a life-threatening emergency to the triager    Negative: Snake bite    Negative: Bite, wound, or sting from fish    Protocols used: ANIMAL BITE-A-AH

## 2021-11-01 NOTE — TELEPHONE ENCOUNTER
Spoke with Betsy RUDD- there are two doses at [place]. Patient can schedule for Wednesday  Calling patient to schedule this appointment    \

## 2021-11-01 NOTE — TELEPHONE ENCOUNTER
Appointment scheduled.  Thank You,    Lina FLETCHERRN Triage  [facility]  [phone]

## 2021-11-03 ENCOUNTER — ALLIED HEALTH/NURSE VISIT (OUTPATIENT)
Dept: FAMILY MEDICINE | Facility: CLINIC | Age: 26
End: 2021-11-03
Payer: COMMERCIAL

## 2021-11-03 DIAGNOSIS — Z23 NEED FOR VACCINATION: Primary | ICD-10-CM

## 2021-11-03 PROCEDURE — 90471 IMMUNIZATION ADMIN: CPT

## 2021-11-03 PROCEDURE — 99207 PR NO CHARGE NURSE ONLY: CPT

## 2021-11-03 PROCEDURE — 90675 RABIES VACCINE IM: CPT

## 2021-11-03 NOTE — PROGRESS NOTES
Prior to immunization administration, verified patients identity using patient s name and date of birth. Please see Immunization Activity for additional information.     Screening Questionnaire for Adult Immunization    Are you sick today?   No   Do you have allergies to medications, food, a vaccine component or latex?   Yes   Have you ever had a serious reaction after receiving a vaccination?   No   Do you have a long-term health problem with heart, lung, kidney, or metabolic disease (e.g., diabetes), asthma, a blood disorder, no spleen, complement component deficiency, a cochlear implant, or a spinal fluid leak?  Are you on long-term aspirin therapy?   No   Do you have cancer, leukemia, HIV/AIDS, or any other immune system problem?   No   Do you have a parent, brother, or sister with an immune system problem?   No   In the past 3 months, have you taken medications that affect  your immune system, such as prednisone, other steroids, or anticancer drugs; drugs for the treatment of rheumatoid arthritis, Crohn s disease, or psoriasis; or have you had radiation treatments?   No   Have you had a seizure, or a brain or other nervous system problem?   No   During the past year, have you received a transfusion of blood or blood    products, or been given immune (gamma) globulin or antiviral drug?   Yes   For women: Are you pregnant or is there a chance you could become       pregnant during the next month?   Yes   Have you received any vaccinations in the past 4 weeks?   Yes     Immunization questionnaire was positive for at least one answer.  Notified Dr. Duval.        Per orders of Ross Springer, injection of Imovax (Rabies) given by Dain Jones CMA. Patient instructed to remain in clinic for 15 minutes afterwards, and to report any adverse reaction to me immediately.       Screening performed by Dain Jones CMA on 11/3/2021 at 3:27 PM.

## 2021-11-04 ENCOUNTER — TELEPHONE (OUTPATIENT)
Dept: FAMILY MEDICINE | Facility: CLINIC | Age: 26
End: 2021-11-04

## 2021-11-04 NOTE — TELEPHONE ENCOUNTER
Pt called verifying that it is ok to get Rabies vaccine on Monday, because it's actually due Sunday.      Huddled with providers and reviewed [facility] recommendations    Every effort should be made to adhere to schedule per [facility], but for the 3rd and 4th dose, deviations of a few days are acceptable.  Therefore vaccination on Monday, 1 day late is appropriate.    Alternatives are: Pt could go to [facility] to receive injection.  Informed pt of all, and pt will plan to be here Monday.    Mabel Sheets RN  [facility] RN Triage Team

## 2021-11-08 ENCOUNTER — ALLIED HEALTH/NURSE VISIT (OUTPATIENT)
Dept: FAMILY MEDICINE | Facility: CLINIC | Age: 26
End: 2021-11-08
Payer: COMMERCIAL

## 2021-11-08 DIAGNOSIS — Z23 NEED FOR VACCINATION: Primary | ICD-10-CM

## 2021-11-08 PROCEDURE — 90675 RABIES VACCINE IM: CPT

## 2021-11-08 PROCEDURE — 99207 PR NO CHARGE NURSE ONLY: CPT

## 2021-11-08 PROCEDURE — 90471 IMMUNIZATION ADMIN: CPT

## 2021-11-08 NOTE — PROGRESS NOTES
Prior to immunization administration, verified patients identity using patient s name and date of birth. Please see Immunization Activity for additional information.     Screening Questionnaire for Adult Immunization    Are you sick today?   No   Do you have allergies to medications, food, a vaccine component or latex?   Yes   Have you ever had a serious reaction after receiving a vaccination?   No   Do you have a long-term health problem with heart, lung, kidney, or metabolic disease (e.g., diabetes), asthma, a blood disorder, no spleen, complement component deficiency, a cochlear implant, or a spinal fluid leak?  Are you on long-term aspirin therapy?   No   Do you have cancer, leukemia, HIV/AIDS, or any other immune system problem?   No   Do you have a parent, brother, or sister with an immune system problem?   No   In the past 3 months, have you taken medications that affect  your immune system, such as prednisone, other steroids, or anticancer drugs; drugs for the treatment of rheumatoid arthritis, Crohn s disease, or psoriasis; or have you had radiation treatments?   No   Have you had a seizure, or a brain or other nervous system problem?   No   During the past year, have you received a transfusion of blood or blood    products, or been given immune (gamma) globulin or antiviral drug?   Yes   For women: Are you pregnant or is there a chance you could become       pregnant during the next month?   Yes   Have you received any vaccinations in the past 4 weeks?   No     Immunization questionnaire was positive for at least one answer.  Notified Dr. Duval.        Per orders of Ross Springer, injection of Imovax (Rabies) given by Dain Jones CMA. Patient instructed to remain in clinic for 15 minutes afterwards, and to report any adverse reaction to me immediately.       Screening performed by Dain Jones CMA on 11/8/2021 at 11:39 AM.

## 2021-11-15 ENCOUNTER — ALLIED HEALTH/NURSE VISIT (OUTPATIENT)
Dept: FAMILY MEDICINE | Facility: CLINIC | Age: 26
End: 2021-11-15
Payer: COMMERCIAL

## 2021-11-15 DIAGNOSIS — Z23 NEED FOR VACCINATION: Primary | ICD-10-CM

## 2021-11-15 PROCEDURE — 90675 RABIES VACCINE IM: CPT

## 2021-11-15 PROCEDURE — 90471 IMMUNIZATION ADMIN: CPT

## 2021-11-15 PROCEDURE — 99207 PR NO CHARGE NURSE ONLY: CPT

## 2021-11-15 NOTE — PROGRESS NOTES
Prior to immunization administration, verified patients identity using patient s name and date of birth. Please see Immunization Activity for additional information.     Screening Questionnaire for Adult Immunization    Are you sick today?   No   Do you have allergies to medications, food, a vaccine component or latex?   Yes   Have you ever had a serious reaction after receiving a vaccination?   No   Do you have a long-term health problem with heart, lung, kidney, or metabolic disease (e.g., diabetes), asthma, a blood disorder, no spleen, complement component deficiency, a cochlear implant, or a spinal fluid leak?  Are you on long-term aspirin therapy?   No   Do you have cancer, leukemia, HIV/AIDS, or any other immune system problem?   No   Do you have a parent, brother, or sister with an immune system problem?   No   In the past 3 months, have you taken medications that affect  your immune system, such as prednisone, other steroids, or anticancer drugs; drugs for the treatment of rheumatoid arthritis, Crohn s disease, or psoriasis; or have you had radiation treatments?   No   Have you had a seizure, or a brain or other nervous system problem?   No   During the past year, have you received a transfusion of blood or blood    products, or been given immune (gamma) globulin or antiviral drug?   Yes   For women: Are you pregnant or is there a chance you could become       pregnant during the next month?   Yes   Have you received any vaccinations in the past 4 weeks?   No     Immunization questionnaire was positive for at least one answer.  Notified Dr. Duval.        Per orders of Ross Springer, injection of Imovax (Rabies) given by Dain Jones CMA. Patient instructed to remain in clinic for 15 minutes afterwards, and to report any adverse reaction to me immediately.       Screening performed by Dain Jones CMA on 11/15/2021 at 11:08 AM.

## 2021-12-30 ENCOUNTER — HOSPITAL ENCOUNTER (EMERGENCY)
Facility: CLINIC | Age: 26
Discharge: HOME | End: 2021-12-30
Attending: PHYSICIAN ASSISTANT | Admitting: PHYSICIAN ASSISTANT
Payer: COMMERCIAL

## 2021-12-30 ENCOUNTER — APPOINTMENT (OUTPATIENT)
Dept: CT IMAGING | Facility: CLINIC | Age: 26
End: 2021-12-30
Attending: PHYSICIAN ASSISTANT
Payer: COMMERCIAL

## 2021-12-30 VITALS
TEMPERATURE: 98 F | HEART RATE: 69 BPM | BODY MASS INDEX: 20.46 KG/M2 | HEIGHT: 68 IN | SYSTOLIC BLOOD PRESSURE: 106 MMHG | OXYGEN SATURATION: 100 % | RESPIRATION RATE: 16 BRPM | WEIGHT: 135 LBS | DIASTOLIC BLOOD PRESSURE: 92 MMHG

## 2021-12-30 DIAGNOSIS — G44.219 EPISODIC TENSION-TYPE HEADACHE, NOT INTRACTABLE: ICD-10-CM

## 2021-12-30 DIAGNOSIS — H57.02 ANISOCORIA: ICD-10-CM

## 2021-12-30 PROCEDURE — 99284 EMERGENCY DEPT VISIT MOD MDM: CPT | Mod: 25

## 2021-12-30 PROCEDURE — 70450 CT HEAD/BRAIN W/O DYE: CPT

## 2021-12-30 RX ORDER — ACETAMINOPHEN 500 MG
1000 TABLET ORAL ONCE
Status: DISCONTINUED | OUTPATIENT
Start: 2021-12-30 | End: 2021-12-30 | Stop reason: HOSPADM

## 2021-12-30 ASSESSMENT — MIFFLIN-ST. JEOR: SCORE: 1395.86

## 2021-12-30 NOTE — ED PROVIDER NOTES
History     Chief Complaint:  Headache    HPI   Melina Palacios is a 26 year old female who presents with a headache and anisocoria, with right pupil greater than left.  The patient woke up with a headache this morning at approximately 0630. She states it was mild and has gradually worsened but is only about a 4 out of 10 in intensity. It was not sudden in onset.  She feels like her eyes are slightly sluggish, and she felt like her right eye was a little bit dry this morning.  She states that she normally has some difficulty focusing her eyes, but this is not new this morning.  No double vision, loss of vision, blurry vision, itching or burning in the eye. The patient denies any photophobia.     Eye pressures: 16 mm Hg left eye, 19 mm Hg right eye     Review of Systems  Ten review of systems negative, apart from what is noted above in HPI.     Allergies:  Azithromycin  Penicillins  Amoxicillin      Medications:    Prenat w/o [id number]-[id number]-DHA (PNV-DHA PO)      Past Medical History:    Past Medical History:   Diagnosis Date     Depressive disorder      [place] (generalized anxiety disorder)      IBS (irritable bowel syndrome)      Other immediate postpartum hemorrhage 7/26/2019     Ovarian cyst      Third degree laceration of perineum during delivery, postpartum 7/26/2019     Vacuum extractor delivery, delivered 7/26/2019     Past Surgical History:    Past Surgical History:   Procedure Laterality Date     ENT SURGERY      wisdom teeth extraction      wisdom teeth         Family History:    Family History   Problem Relation Age of Onset     Cancer Mother      Breast Cancer Mother      Cancer Maternal Aunt      Breast Cancer Maternal Aunt      Thyroid Disease Maternal Grandmother      Breast Cancer Paternal Grandmother      No Known Problems Father      No Known Problems Sister      No Known Problems Brother      No Known Problems Maternal Grandfather      No Known Problems Other        Social History:  Has  "children.   Does not do drugs.     Physical Exam     Patient Vitals for the past 24 hrs:   BP Temp Temp src Pulse Resp SpO2 Height Weight   12/30/21 0947 (!) 106/92 98  F (36.7  C) Temporal 69 16 100 % 1.727 m (5' 8\") 61.2 kg (135 lb)       Physical Exam  General: Alert and interactive. Appears well. Cooperative and pleasant.   Eyes: The pupils are equal and round.   The patient right pupil is 6 mm and her left pupil is 5 mm. This is the same in light and dark light. Normal ROM. Patient is very sensitive to movement of eyes but does not complain of photophobia. Pupils are equally reactive; neither is sluggish.   ENT: No abnormalities to the external nose or ears. Mucous membranes moist. Posterior oropharynx is non-erythematous.      Neck: Trachea is in the midline. No nuchal rigidity.     CV: Regular rate and rhythm. S1 and S2 normal without murmur, click, gallop or rub.   Resp: Breath sounds are clear bilaterally, without rhonchi, wheezes, rales. Non-labored, no retractions or accessory muscle use.     GI: Abdomen is soft without distension. No tenderness to palpation. No peritoneal signs.    MS: Moving all extremities well. Good muscle tone.   Skin: Warm and dry. No rash or lesions noted.  Neuro: Alert and oriented x 3. No focal neurologic deficits. Good strength and sensation in upper and lower extremities.    Psych: Awake. Alert.  Normal affect. Appropriate interactions.  Lymph: No anterior or posterior cervical lymphadenopathy noted.    Emergency Department Course     Imaging:  Head CT w/o contrast   Final Result   IMPRESSION:  No evidence of acute intracranial hemorrhage, mass, or   herniation.         KAUR SMITH MD            SYSTEM ID:  [id number]          Laboratory:  Labs Ordered and Resulted from Time of ED Arrival to Time of ED Departure - No data to display    Emergency Department Course:      Reviewed:  I reviewed nursing notes, vitals, past history and care everywhere    Assessments:   I obtained " history and examined the patient as noted above.    I rechecked the patient and explained findings.     Interventions:  Medications   acetaminophen (TYLENOL) tablet 1,000 mg (has no administration in time range)     Disposition:  The patient was discharged to home.    Impression & Plan      Medical Decision Making:  Melina Palacios is a 26 year old female who presents for evaluation of a mild headache that was gradual in onset associated with anisocoria. The patient looked in the mirror this morning and noticed that her right pupil was slightly bigger than her left. She has never noticed this before, and I do not see it noted in any other physical exam. On examination, her right pupil is 6 mm and her left pupil is 5 mm, which is likely physiologically normal. She has no other neurologic deficit at this time, and there is no ptosis, facial droop, numbness or tingling. I doubt Melissa syndrome. Patient has very sensitive eyes but states that she is not photophobic. Her eye pressures are normal and there is no red eye or concern for iritis, conjunctivitis, keratitis, or glaucoma. CT of the head is normal without any masses, intracranial hemorrhage, or CVA.  At this juncture I think the patient can take Tylenol and rest at home. If she notices any neurologic deficits in the meantime, she needs to return here or be followed closely with her primary doctor. She has no further questions or concerns at this time and is discharged home    Covid-19  Melina Palacios was evaluated during a global COVID-19 pandemic, which necessitated consideration that the patient might be at risk for infection with the SARS-CoV-2 virus that causes COVID-19.   Applicable protocols for evaluation were followed during the patient's care.       Diagnosis:    ICD-10-CM    1. Episodic tension-type headache, not intractable  G44.219    2. Anisocoria  H57.02         Hedy Francis PA-C  12/30/21 1930

## 2021-12-30 NOTE — ED TRIAGE NOTES
Patient complains of throbbing headache. States right pupil looks more dilated than left pupil. Denies N/V. States woke up with the pain.

## 2022-01-29 ENCOUNTER — HEALTH MAINTENANCE LETTER (OUTPATIENT)
Age: 27
End: 2022-01-29

## 2022-02-08 ENCOUNTER — APPOINTMENT (OUTPATIENT)
Dept: ULTRASOUND IMAGING | Facility: CLINIC | Age: 27
End: 2022-02-08
Attending: PHYSICIAN ASSISTANT
Payer: COMMERCIAL

## 2022-02-08 ENCOUNTER — HOSPITAL ENCOUNTER (EMERGENCY)
Facility: CLINIC | Age: 27
Discharge: HOME | End: 2022-02-08
Attending: PHYSICIAN ASSISTANT | Admitting: PHYSICIAN ASSISTANT
Payer: COMMERCIAL

## 2022-02-08 VITALS
RESPIRATION RATE: 18 BRPM | SYSTOLIC BLOOD PRESSURE: 106 MMHG | HEIGHT: 68 IN | WEIGHT: 135 LBS | OXYGEN SATURATION: 99 % | DIASTOLIC BLOOD PRESSURE: 68 MMHG | TEMPERATURE: 97.7 F | BODY MASS INDEX: 20.46 KG/M2 | HEART RATE: 100 BPM

## 2022-02-08 DIAGNOSIS — M54.50 LEFT-SIDED LOW BACK PAIN WITHOUT SCIATICA: ICD-10-CM

## 2022-02-08 DIAGNOSIS — Z34.90 INTRAUTERINE PREGNANCY: ICD-10-CM

## 2022-02-08 DIAGNOSIS — R82.90 ABNORMAL URINALYSIS: ICD-10-CM

## 2022-02-08 LAB
ALBUMIN UR-MCNC: 30 MG/DL
ANION GAP SERPL CALCULATED.3IONS-SCNC: 6 MMOL/L (ref 3–14)
APPEARANCE UR: ABNORMAL
B-HCG SERPL-ACNC: ABNORMAL IU/L (ref 0–5)
BACTERIA #/AREA URNS HPF: ABNORMAL /HPF
BASOPHILS # BLD AUTO: 0 10E3/UL (ref 0–0.2)
BASOPHILS NFR BLD AUTO: 0 %
BILIRUB UR QL STRIP: NEGATIVE
BUN SERPL-MCNC: 7 MG/DL (ref 7–30)
CALCIUM SERPL-MCNC: 9 MG/DL (ref 8.5–10.1)
CHLORIDE BLD-SCNC: 102 MMOL/L (ref 94–109)
CO2 SERPL-SCNC: 24 MMOL/L (ref 20–32)
COLOR UR AUTO: YELLOW
CREAT SERPL-MCNC: 0.48 MG/DL (ref 0.52–1.04)
EOSINOPHIL # BLD AUTO: 0 10E3/UL (ref 0–0.7)
EOSINOPHIL NFR BLD AUTO: 0 %
ERYTHROCYTE [DISTWIDTH] IN BLOOD BY AUTOMATED COUNT: 13.6 % (ref 10–15)
GFR SERPL CREATININE-BSD FRML MDRD: >90 ML/MIN/1.73M2
GLUCOSE BLD-MCNC: 84 MG/DL (ref 70–99)
GLUCOSE UR STRIP-MCNC: NEGATIVE MG/DL
HCT VFR BLD AUTO: 44 % (ref 35–47)
HGB BLD-MCNC: 14.2 G/DL (ref 11.7–15.7)
HGB UR QL STRIP: NEGATIVE
IMM GRANULOCYTES # BLD: 0 10E3/UL
IMM GRANULOCYTES NFR BLD: 0 %
KETONES UR STRIP-MCNC: NEGATIVE MG/DL
LEUKOCYTE ESTERASE UR QL STRIP: ABNORMAL
LYMPHOCYTES # BLD AUTO: 1.8 10E3/UL (ref 0.8–5.3)
LYMPHOCYTES NFR BLD AUTO: 22 %
MCH RBC QN AUTO: 27.8 PG (ref 26.5–33)
MCHC RBC AUTO-ENTMCNC: 32.3 G/DL (ref 31.5–36.5)
MCV RBC AUTO: 86 FL (ref 78–100)
MONOCYTES # BLD AUTO: 0.6 10E3/UL (ref 0–1.3)
MONOCYTES NFR BLD AUTO: 8 %
MUCOUS THREADS #/AREA URNS LPF: PRESENT /LPF
NEUTROPHILS # BLD AUTO: 5.7 10E3/UL (ref 1.6–8.3)
NEUTROPHILS NFR BLD AUTO: 70 %
NITRATE UR QL: NEGATIVE
NRBC # BLD AUTO: 0 10E3/UL
NRBC BLD AUTO-RTO: 0 /100
PH UR STRIP: 7.5 [PH] (ref 5–7)
PLATELET # BLD AUTO: 227 10E3/UL (ref 150–450)
POTASSIUM BLD-SCNC: 3.3 MMOL/L (ref 3.4–5.3)
RBC # BLD AUTO: 5.11 10E6/UL (ref 3.8–5.2)
RBC URINE: 1 /HPF
SODIUM SERPL-SCNC: 132 MMOL/L (ref 133–144)
SP GR UR STRIP: 1.02 (ref 1–1.03)
SQUAMOUS EPITHELIAL: 15 /HPF
UROBILINOGEN UR STRIP-MCNC: NORMAL MG/DL
WBC # BLD AUTO: 8.2 10E3/UL (ref 4–11)
WBC URINE: 10 /HPF

## 2022-02-08 PROCEDURE — 36415 COLL VENOUS BLD VENIPUNCTURE: CPT | Performed by: PHYSICIAN ASSISTANT

## 2022-02-08 PROCEDURE — 76801 OB US < 14 WKS SINGLE FETUS: CPT

## 2022-02-08 PROCEDURE — 99284 EMERGENCY DEPT VISIT MOD MDM: CPT | Mod: 25

## 2022-02-08 PROCEDURE — 85025 COMPLETE CBC W/AUTO DIFF WBC: CPT | Performed by: PHYSICIAN ASSISTANT

## 2022-02-08 PROCEDURE — 87086 URINE CULTURE/COLONY COUNT: CPT | Performed by: PHYSICIAN ASSISTANT

## 2022-02-08 PROCEDURE — 81001 URINALYSIS AUTO W/SCOPE: CPT | Performed by: PHYSICIAN ASSISTANT

## 2022-02-08 PROCEDURE — 81001 URINALYSIS AUTO W/SCOPE: CPT | Performed by: EMERGENCY MEDICINE

## 2022-02-08 PROCEDURE — 84702 CHORIONIC GONADOTROPIN TEST: CPT | Performed by: PHYSICIAN ASSISTANT

## 2022-02-08 PROCEDURE — 82310 ASSAY OF CALCIUM: CPT | Performed by: PHYSICIAN ASSISTANT

## 2022-02-08 RX ORDER — CEPHALEXIN 500 MG/1
500 CAPSULE ORAL 3 TIMES DAILY
Qty: 21 CAPSULE | Refills: 0 | Status: SHIPPED | OUTPATIENT
Start: 2022-02-08 | End: 2022-02-15

## 2022-02-08 ASSESSMENT — MIFFLIN-ST. JEOR: SCORE: 1395.86

## 2022-02-08 NOTE — DISCHARGE INSTRUCTIONS
Discharge Instructions  Back Pain  You were seen today for back pain. Back pain can have many causes, but most will get better without surgery or other specific treatment. Sometimes there is a herniated ( slipped ) disc. We do not usually do MRI scans to look for these right away, since most herniated discs will get better on their own with time.  Today, we did not find any evidence that your back pain was caused by a serious condition. However, sometimes symptoms develop over time and cannot be found during an emergency visit, so it is very important that you follow up with your primary provider.  Generally, every Emergency Department visit should have a follow-up clinic visit with either a primary or a specialty clinic/provider. Please follow-up as instructed by your emergency provider today.    Return to the Emergency Department if:  You develop a fever with your back pain.   You have weakness or change in sensation in one or both legs.  You lose control of your bowels or bladder, or cannot empty your bladder (cannot pee).  Your pain gets much worse.     Follow-up with your provider:  Unless your pain has completely gone away, please make an appointment with your provider within one week. Most of the routine care for back pain is available in a clinic and not the Emergency Department. You may need further management of your back pain, such as more pain medication, imaging such as an X-ray or MRI, or physical therapy.    What can I do to help myself?  Remain Active -- People are often afraid that they will hurt their back further or delay recovery by remaining active, but this is one of the best things you can do for your back. In fact, staying in bed for a long time to rest is not recommended. Studies have shown that people with low back pain recover faster when they remain active. Movement helps to bring blood flow to the muscles and relieve muscle spasms as well as preventing loss of muscle strength.  Heat --  Using a heating pad can help with low back pain during the first few weeks. Do not sleep with a heating pad, as you can be burned.   Pain medications - You may take a pain medication such as Tylenol  (acetaminophen), Advil , Motrin  (ibuprofen) or Aleve  (naproxen).  If you were given a prescription for medicine here today, be sure to read all of the information (including the package insert) that comes with your prescription.  This will include important information about the medicine, its side effects, and any warnings that you need to know about.  The pharmacist who fills the prescription can provide more information and answer questions you may have about the medicine.  If you have questions or concerns that the pharmacist cannot address, please call or return to the Emergency Department.   Remember that you can always come back to the Emergency Department if you are not able to see your regular provider in the amount of time listed above, if you get any new symptoms, or if there is anything that worries you.  Discharge Instructions  Urinary Tract Infection  You or your child have been diagnosed with a urinary tract infection, or UTI. The urinary tract includes the kidneys (which make urine/pee), ureters (the tubes that carry urine/pee from the kidneys to the bladder), the bladder (which stores urine/pee), and urethra (the tube that carries urine/pee out of the bladder). Urinary tract infections occur when bacteria travel up the urethra into the bladder (bladder infection) and, in some cases, from there into the kidneys (kidney infection).  Generally, every Emergency Department visit should have a follow-up clinic visit with either a primary or a specialty clinic/provider. Please follow-up as instructed by your emergency provider today.  Return to the Emergency Department if:  You or your child have severe back pain.  You or your child are vomiting (throwing up) so that you cannot take your medicine.  You or  your child have a new fever (had not previously had a fever) over 101 F.  You or your child have confusion or are very weak, or feel very ill.  Your child seems much more ill, will not wake up, will not respond right, or is crying for a long time and will not calm down.  You or your child are showing signs of dehydration. These signs may include decreased urination (pee), dry mouth/gums/tongue, or decreased activity.    Follow-up with your provider:   Children under 24 months need to be seen by their regular provider within one week after a diagnosis of a UTI. It may be necessary to do some more tests to look at the child s kidney or bladder.  You should begin to feel better within 24 - 48 hours of starting your antibiotic; follow-up with your regular clinic/doctor/provider if this is not the case.    Treatment:   You will be treated with an antibiotic to kill the bacteria. We have to make an educated guess, based on what we know about common bacteria and antibiotics, as to which antibiotic will work for your infection. We will be correct most times but there will be some cases where the antibiotic chosen is not correct (see urine cultures below).  Take a pain medication such as acetaminophen (Tylenol ) or ibuprofen (Advil , Motrin , Nuprin ).  Phenazopyridine (Pyridium , Uristat ) is a prescription medication that numbs the bladder to reduce the burning pain of some UTIs.  The same medication is available in a non-prescription version (Azo-Standard , Urodol ). This medication will change the color of the urine and tears (usually blue or orange). If you wear contacts, do not wear them while taking this medication as they may be stained by the medication.    Urine Cultures:  If indicated, a urine culture may have been performed today. This test generally takes 24-48 hours to complete so the results are not known at this time. The results can confirm that an infection is present but also determine which antibiotic is  "effective for the specific bacteria that is causing the infection. If your urine culture shows that the antibiotic you were given today will not work to treat your infection, we will attempt to contact you to make arrangements to change the antibiotic. If the culture confirms that the antibiotic is effective for your infection, you will not be contacted. We often recommend follow-up with your regular physician/provider on the culture results regardless of this process.    Antibiotic Warning:   If you have been placed on antibiotics - watch for signs of allergic reaction.  These include rash, lip swelling, difficulty breathing, wheezing, and dizziness.  If you develop any of these symptoms, stop the antibiotic immediately and go to an emergency room or urgent care for evaluation.    Probiotics: If you have been given an antibiotic, you may want to also take a probiotic pill or eat yogurt with live cultures. Probiotics have \"good bacteria\" to help your intestines stay healthy. Studies have shown that probiotics help prevent diarrhea and other intestine problems (including C. diff infection) when you take antibiotics. You can buy these without a prescription in the pharmacy section of the store.   If you were given a prescription for medicine here today, be sure to read all of the information (including the package insert) that comes with your prescription.  This will include important information about the medicine, its side effects, and any warnings that you need to know about.  The pharmacist who fills the prescription can provide more information and answer questions you may have about the medicine.  If you have questions or concerns that the pharmacist cannot address, please call or return to the Emergency Department.   Remember that you can always come back to the Emergency Department if you are not able to see your regular provider in the amount of time listed above, if you get any new symptoms, or if there is " anything that worries you.

## 2022-02-08 NOTE — ED PROVIDER NOTES
History     Chief Complaint:  Back Pain (low back pain for 3 days pt pt 6 weeks pregnant)      HPI   Melina Palacios is a 26 year old female G3, P2 6 weeks pregnant by gestational dates who presents with left-sided low back pain.  She states this started about 3 days ago.  It is worse with movement and positional change.  Denies any falls or recent injuries.  No bowel or bladder incontinence or numbness or weakness in the legs.  No fever chills or IV drug use.  No history of prior back surgeries or recent injections.  Not on blood thinners.  No dysuria or hematuria.  Does have a prior history of occasional back pain due to scoliosis.  No history of cancer or unusual weight loss or night sweats.  Patient denies vaginal bleeding or abdominal pain or vaginal discharge but has not yet undergone ultrasound this pregnancy.  Took Tylenol this morning with some relief.  She has an appointment with her OB tomorrow already.      Review of Systems   All other systems reviewed and are negative.    Allergies:  Azithromycin  Penicillins  Amoxicillin      Medications:    cephALEXin (KEFLEX) 500 MG capsule  Prenat w/o D-TR-Rlnikvu-FA-DHA (PNV-DHA PO)        Past Medical History:    Past Medical History:   Diagnosis Date     Depressive disorder      [place] (generalized anxiety disorder)      IBS (irritable bowel syndrome)      Other immediate postpartum hemorrhage 7/26/2019     Ovarian cyst      Third degree laceration of perineum during delivery, postpartum 7/26/2019     Vacuum extractor delivery, delivered 7/26/2019     Patient Active Problem List    Diagnosis Date Noted     Vaginal delivery 02/05/2021     Priority: Medium     [place] (generalized anxiety disorder)      Priority: Medium     Depressive disorder      Priority: Medium     Labor and delivery, indication for care 02/03/2021     Priority: Medium     Supervision of high-risk pregnancy 07/13/2020     Priority: Medium     Boy!, nl tino, post plac  (bleed 10/19-21)SOPHIA: 2/18  "odell Kevin    Innatal:46XY 7% AFP-dec  dep/anx-sert @ 28w   VAVD 40+2 Hira 8-7# PROM-refused pit for hours then got 2mu & 10cm pushed 3 hrs. vac done. compound pres 3rd degree  3.5cm LOVcyst.   Glu/hgb: 102/12.0       Situational anxiety 08/02/2018     Priority: Medium     During pregnancy       Irritable bowel syndrome with diarrhea 08/02/2018     Priority: Medium        Past Surgical History:    Past Surgical History:   Procedure Laterality Date     ENT SURGERY      wisdom teeth extraction      wisdom teeth         Family History:    Family History   Problem Relation Age of Onset     Cancer Mother      Breast Cancer Mother      Cancer Maternal Aunt      Breast Cancer Maternal Aunt      Thyroid Disease Maternal Grandmother      Breast Cancer Paternal Grandmother      No Known Problems Father      No Known Problems Sister      No Known Problems Brother      No Known Problems Maternal Grandfather      No Known Problems Other        Social History:  Presents alone.  No IV drug use  Physical Exam     Patient Vitals for the past 24 hrs:   BP Temp Temp src Pulse Resp SpO2 Height Weight   02/08/22 1330 106/68 -- -- -- -- -- -- --   02/08/22 0917 109/65 97.7  F (36.5  C) Temporal 100 18 99 % 1.727 m (5' 8\") 61.2 kg (135 lb)       Physical Exam  General: Awake, alert, pleasant, non-toxic.  Head:  Scalp is NC/AT  Eyes:  Conjunctiva normal, PERRL  ENT:  The external nose and ears are normal.   Neck:  Normal range of motion without rigidity.  CV:  Regular rate and rhythm    No pathologic murmur, rubs, or gallops.  Resp:  Breath sounds are clear bilaterally    Non-labored, no retractions or accessory muscle use  Abdomen: Abdomen is soft, no distension, no tenderness, no masses. No CVA tenderness.  MS:  No lower extremity edema or asymmetric calf swelling. Normal ROM in all joints without effusions.    No midline cervical, thoracic, or lumbar tenderness  Skin:  Warm and dry, No rash or lesions noted.  Neuro: Alert and oriented " x3.  GCS 15  No facial asymmetry.  5/5 strength BL in UE and LE, normal sensation to touch.  Gait normal  Psych: Awake. Alert. Normal affect. Appropriate interactions.    Emergency Department Course     Imaging:  US OB <14 Weeks W Transvaginal   Preliminary Result   IMPRESSION:    1. Early single live intrauterine pregnancy of approximately 6 weeks 2   days gestation.   2. Left paraovarian simple cyst is 3.6 cm. Small left adnexal fluid.          Laboratory:  Labs Ordered and Resulted from Time of ED Arrival to Time of ED Departure   ROUTINE UA WITH MICROSCOPIC REFLEX TO CULTURE - Abnormal       Result Value    Color Urine Yellow      Appearance Urine Slightly Cloudy (*)     Glucose Urine Negative      Bilirubin Urine Negative      Ketones Urine Negative      Specific Gravity Urine 1.025      Blood Urine Negative      pH Urine 7.5 (*)     Protein Albumin Urine 30  (*)     Urobilinogen Urine Normal      Nitrite Urine Negative      Leukocyte Esterase Urine Moderate (*)     Bacteria Urine Few (*)     Mucus Urine Present (*)     RBC Urine 1      WBC Urine 10 (*)     Squamous Epithelials Urine 15 (*)    BASIC METABOLIC PANEL - Abnormal    Sodium 132 (*)     Potassium 3.3 (*)     Chloride 102      Carbon Dioxide (CO2) 24      Anion Gap 6      Urea Nitrogen 7      Creatinine 0.48 (*)     Calcium 9.0      Glucose 84      GFR Estimate >90     HCG QUANTITATIVE PREGNANCY - Abnormal    hCG Quantitative 32,197 (*)    CBC WITH PLATELETS AND DIFFERENTIAL    WBC Count 8.2      RBC Count 5.11      Hemoglobin 14.2      Hematocrit 44.0      MCV 86      MCH 27.8      MCHC 32.3      RDW 13.6      Platelet Count 227      % Neutrophils 70      % Lymphocytes 22      % Monocytes 8      % Eosinophils 0      % Basophils 0      % Immature Granulocytes 0      NRBCs per 100 WBC 0      Absolute Neutrophils 5.7      Absolute Lymphocytes 1.8      Absolute Monocytes 0.6      Absolute Eosinophils 0.0      Absolute Basophils 0.0      Absolute Immature  Granulocytes 0.0      Absolute NRBCs 0.0     URINE CULTURE     Emergency Department Course:  Reviewed:  I reviewed nursing notes, vitals, past history and care everywhere    Assessments:   I obtained history and examined the patient as noted above.    I rechecked the patient and explained findings.     Disposition:  The patient was discharged to home.    Impression & Plan        Medical Decision Making:  This patient presented with low back pain.  Patient is well appearing with normal vitals.  Pain has improved with interventions in the emergency department.  The patient did not sustain any trauma and has no midline spinous tenderness.  Therefore x-rays are not necessary due to the low likelihood of fracture or subluxation. The patient has not had fever, chills, IV drug use, saddle anesthesia, or bowel or bladder dysfunction.  No history or symptoms of malignancy and no recent surgical intervention.  There is no clinical evidence of cauda equina syndrome, spinal epidural abscess, osteomyelitis, cord compression.  The patient has no abdominal pain tenderness or vaginal bleeding nevertheless given pregnancy without prior ultrasound to confirm location we did obtain this which showed live intrauterine pregnancy without hemorrhage.  Did also show small left ovarian cyst, no symptoms or evidence to suggest torsion.  Blood work unremarkable.  UA with some pyuria though also somewhat contaminated.  I do not think her symptoms are related to pyelonephritis but nevertheless given pregnancy after discussion of risks and benefits we will initiate treatment with Keflex and send culture.    The neurovascular exam is normal and the patient's symptoms are consistent with musculoskeletal cause. The patient will be discharged with medications as below to use as directed, and advised to use OTC analgesics.  Ice or heat to the back and stretching exercises.  No heavy lifting, bending or twisting. Return if increasing pain, numbness,  weakness, fever, chills, or bowel or bladder dysfunction.  They should schedule follow-up with their doctor within 3 days.    Diagnosis:    ICD-10-CM    1. Intrauterine pregnancy  Z34.90    2. Left-sided low back pain without sciatica  M54.50    3. Abnormal urinalysis  R82.90        Discharge Medications:  Discharge Medication List as of 2/8/2022  3:01 PM      START taking these medications    Details   cephALEXin (KEFLEX) 500 MG capsule Take 1 capsule (500 mg) by mouth 3 times daily for 7 days, Disp-21 capsule, R-0, E-Prescribe               Scribe Disclosure:  Ricardo GARRIDO PA-C, am serving as a scribe at 5:56 PM on 2/8/2022 to document services personally performed by Ricardo Chow PA-C based on my observations and the provider's statements to me.      Ricardo Chow PA-C  02/08/22 1815

## 2022-02-10 LAB — BACTERIA UR CULT: NORMAL

## 2022-02-22 ENCOUNTER — TRANSFERRED RECORDS (OUTPATIENT)
Dept: HEALTH INFORMATION MANAGEMENT | Facility: CLINIC | Age: 27
End: 2022-02-22

## 2022-04-18 ENCOUNTER — TRANSFERRED RECORDS (OUTPATIENT)
Dept: HEALTH INFORMATION MANAGEMENT | Facility: CLINIC | Age: 27
End: 2022-04-18

## 2022-05-18 ENCOUNTER — APPOINTMENT (OUTPATIENT)
Dept: ULTRASOUND IMAGING | Facility: CLINIC | Age: 27
End: 2022-05-18
Attending: OBSTETRICS & GYNECOLOGY
Payer: COMMERCIAL

## 2022-05-18 ENCOUNTER — HOSPITAL ENCOUNTER (OUTPATIENT)
Facility: CLINIC | Age: 27
Discharge: HOME | End: 2022-05-18
Attending: OBSTETRICS & GYNECOLOGY | Admitting: OBSTETRICS & GYNECOLOGY
Payer: COMMERCIAL

## 2022-05-18 ENCOUNTER — HOSPITAL ENCOUNTER (OUTPATIENT)
Facility: CLINIC | Age: 27
End: 2022-05-18
Admitting: OBSTETRICS & GYNECOLOGY
Payer: COMMERCIAL

## 2022-05-18 VITALS — RESPIRATION RATE: 16 BRPM | SYSTOLIC BLOOD PRESSURE: 116 MMHG | TEMPERATURE: 98.9 F | DIASTOLIC BLOOD PRESSURE: 66 MMHG

## 2022-05-18 PROBLEM — Z36.89 ENCOUNTER FOR TRIAGE IN PREGNANT PATIENT: Status: ACTIVE | Noted: 2022-05-18

## 2022-05-18 LAB
ALBUMIN UR-MCNC: NEGATIVE MG/DL
APPEARANCE UR: CLEAR
BILIRUB UR QL STRIP: NEGATIVE
COLOR UR AUTO: ABNORMAL
GLUCOSE UR STRIP-MCNC: NEGATIVE MG/DL
HGB UR QL STRIP: ABNORMAL
KETONES UR STRIP-MCNC: NEGATIVE MG/DL
LEUKOCYTE ESTERASE UR QL STRIP: NEGATIVE
NITRATE UR QL: NEGATIVE
PH UR STRIP: 6 [PH] (ref 5–7)
RBC URINE: 9 /HPF
SP GR UR STRIP: 1.01 (ref 1–1.03)
SQUAMOUS EPITHELIAL: 3 /HPF
UROBILINOGEN UR STRIP-MCNC: NORMAL MG/DL
WBC URINE: 2 /HPF

## 2022-05-18 PROCEDURE — 81001 URINALYSIS AUTO W/SCOPE: CPT | Performed by: OBSTETRICS & GYNECOLOGY

## 2022-05-18 PROCEDURE — G0463 HOSPITAL OUTPT CLINIC VISIT: HCPCS

## 2022-05-18 PROCEDURE — 76817 TRANSVAGINAL US OBSTETRIC: CPT

## 2022-05-18 RX ORDER — PROCHLORPERAZINE 25 MG
25 SUPPOSITORY, RECTAL RECTAL EVERY 12 HOURS PRN
Status: DISCONTINUED | OUTPATIENT
Start: 2022-05-18 | End: 2022-05-18 | Stop reason: HOSPADM

## 2022-05-18 RX ORDER — FAMOTIDINE 20 MG
TABLET ORAL
Status: ON HOLD | COMMUNITY
End: 2022-08-10

## 2022-05-18 RX ORDER — METOCLOPRAMIDE 10 MG/1
10 TABLET ORAL EVERY 6 HOURS PRN
Status: DISCONTINUED | OUTPATIENT
Start: 2022-05-18 | End: 2022-05-18 | Stop reason: HOSPADM

## 2022-05-18 RX ORDER — CALCIUM CARBONATE 500 MG/1
1 TABLET, CHEWABLE ORAL 2 TIMES DAILY
COMMUNITY

## 2022-05-18 RX ORDER — ONDANSETRON 2 MG/ML
4 INJECTION INTRAMUSCULAR; INTRAVENOUS EVERY 6 HOURS PRN
Status: DISCONTINUED | OUTPATIENT
Start: 2022-05-18 | End: 2022-05-18 | Stop reason: HOSPADM

## 2022-05-18 RX ORDER — ONDANSETRON 4 MG/1
4 TABLET, ORALLY DISINTEGRATING ORAL EVERY 6 HOURS PRN
Status: DISCONTINUED | OUTPATIENT
Start: 2022-05-18 | End: 2022-05-18 | Stop reason: HOSPADM

## 2022-05-18 RX ORDER — METOCLOPRAMIDE HYDROCHLORIDE 5 MG/ML
10 INJECTION INTRAMUSCULAR; INTRAVENOUS EVERY 6 HOURS PRN
Status: DISCONTINUED | OUTPATIENT
Start: 2022-05-18 | End: 2022-05-18 | Stop reason: HOSPADM

## 2022-05-18 RX ORDER — PROCHLORPERAZINE MALEATE 5 MG
10 TABLET ORAL EVERY 6 HOURS PRN
Status: DISCONTINUED | OUTPATIENT
Start: 2022-05-18 | End: 2022-05-18 | Stop reason: HOSPADM

## 2022-05-18 NOTE — PLAN OF CARE
[place] [facility] services of Dr. Pennington for evaluation of  labor.  Pt states she has noticed cramping, pressure and low back pain since she woke at 0700. Denies LOF or any bleeding, reports good fetal movement. Pt states the cramping is not rhythmic and mostly notices it when she is standing. Pt has a history of  labor.  Discussed plan of care including toco with doptones, routine VS, UA and transvaginal US for cervical length.  Pt agreeable.  EFM applied and admission assessment completed.

## 2022-05-18 NOTE — PLAN OF CARE
Dr. Pennington called and updated on no uterine activity noted, pt continues to feel pressure while in bed but no cramping unless up and walking. Updated on US report, UA report. Recommends follow up in clinic today with . All discharge instructions reviewed with patient. Denies any questions.

## 2022-05-18 NOTE — DISCHARGE INSTRUCTIONS
Discharge Instruction for Undelivered Patients      You were seen for: Labor Assessment  We Consulted: Dr Pennington  You had (Test or Medicine):External fetal monitoring and doptones, ultrasound and urinalysis     Diet:   Drink 8 to 12 glasses of liquids (milk, juice, water) every day.  You may eat meals and snacks.     Activity:  Call your doctor or nurse midwife if your baby is moving less than usual.     Call your provider if you notice:  Swelling in your face or increased swelling in your hands or legs.  Headaches that are not relieved by Tylenol (acetaminophen).  Changes in your vision (blurring: seeing spots or stars.)  Nausea (sick to your stomach) and vomiting (throwing up).   Weight gain of 5 pounds or more per week.  Heartburn that doesn't go away.  Signs of bladder infection: pain when you urinate (use the toilet), need to go more often and more urgently.  The bag of diaz (rupture of membranes) breaks, or you notice leaking in your underwear.  Bright red blood in your underwear.  Abdominal (lower belly) or stomach pain.  For first baby: Contractions (tightening) less than 5 minutes apart for one hour or more.  Second (plus) baby: Contractions (tightening) less than 10 minutes apart and getting stronger.  *If less than 34 weeks: Contractions (tightening) more than 6 times in one hour.  Increase or change in vaginal discharge (note the color and amount)  Other: Call for any questions or concerns    Follow-up:  Today in clinic with Dr. Hernadez

## 2022-06-24 ENCOUNTER — HOSPITAL ENCOUNTER (OUTPATIENT)
Facility: CLINIC | Age: 27
Discharge: HOME | End: 2022-06-24
Attending: OBSTETRICS & GYNECOLOGY | Admitting: OBSTETRICS & GYNECOLOGY
Payer: COMMERCIAL

## 2022-06-24 VITALS — SYSTOLIC BLOOD PRESSURE: 114 MMHG | DIASTOLIC BLOOD PRESSURE: 62 MMHG | RESPIRATION RATE: 16 BRPM | TEMPERATURE: 98.2 F

## 2022-06-24 DIAGNOSIS — B96.89 BACTERIAL VAGINOSIS IN PREGNANCY: Primary | ICD-10-CM

## 2022-06-24 DIAGNOSIS — O23.599 BACTERIAL VAGINOSIS IN PREGNANCY: Primary | ICD-10-CM

## 2022-06-24 PROBLEM — O60.00 PRETERM LABOR: Status: ACTIVE | Noted: 2022-06-24

## 2022-06-24 LAB
ALBUMIN UR-MCNC: NEGATIVE MG/DL
APPEARANCE UR: CLEAR
BILIRUB UR QL STRIP: NEGATIVE
CLUE CELLS: PRESENT
COLOR UR AUTO: NORMAL
FFN SPECIMEN INTEGRITY: NORMAL
FIBRONECTIN FETAL VAG QL: NEGATIVE
GLUCOSE UR STRIP-MCNC: NEGATIVE MG/DL
HGB UR QL STRIP: NEGATIVE
KETONES UR STRIP-MCNC: NEGATIVE MG/DL
LEUKOCYTE ESTERASE UR QL STRIP: NEGATIVE
NITRATE UR QL: NEGATIVE
PH UR STRIP: 7 [PH] (ref 5–7)
SP GR UR STRIP: 1 (ref 1–1.03)
TRICHOMONAS, WET PREP: ABNORMAL
UROBILINOGEN UR STRIP-MCNC: NORMAL MG/DL
WBC'S/HIGH POWER FIELD, WET PREP: ABNORMAL
YEAST, WET PREP: ABNORMAL

## 2022-06-24 PROCEDURE — 87210 SMEAR WET MOUNT SALINE/INK: CPT | Performed by: OBSTETRICS & GYNECOLOGY

## 2022-06-24 PROCEDURE — G0463 HOSPITAL OUTPT CLINIC VISIT: HCPCS

## 2022-06-24 PROCEDURE — 81003 URINALYSIS AUTO W/O SCOPE: CPT | Performed by: OBSTETRICS & GYNECOLOGY

## 2022-06-24 PROCEDURE — 82731 ASSAY OF FETAL FIBRONECTIN: CPT | Performed by: OBSTETRICS & GYNECOLOGY

## 2022-06-24 RX ORDER — PROCHLORPERAZINE MALEATE 5 MG
10 TABLET ORAL EVERY 6 HOURS PRN
Status: DISCONTINUED | OUTPATIENT
Start: 2022-06-24 | End: 2022-06-25 | Stop reason: HOSPADM

## 2022-06-24 RX ORDER — ONDANSETRON 4 MG/1
4 TABLET, ORALLY DISINTEGRATING ORAL EVERY 6 HOURS PRN
Status: DISCONTINUED | OUTPATIENT
Start: 2022-06-24 | End: 2022-06-25 | Stop reason: HOSPADM

## 2022-06-24 RX ORDER — ONDANSETRON 2 MG/ML
4 INJECTION INTRAMUSCULAR; INTRAVENOUS EVERY 6 HOURS PRN
Status: DISCONTINUED | OUTPATIENT
Start: 2022-06-24 | End: 2022-06-25 | Stop reason: HOSPADM

## 2022-06-24 RX ORDER — PROCHLORPERAZINE 25 MG
25 SUPPOSITORY, RECTAL RECTAL EVERY 12 HOURS PRN
Status: DISCONTINUED | OUTPATIENT
Start: 2022-06-24 | End: 2022-06-25 | Stop reason: HOSPADM

## 2022-06-24 RX ORDER — METOCLOPRAMIDE 10 MG/1
10 TABLET ORAL EVERY 6 HOURS PRN
Status: DISCONTINUED | OUTPATIENT
Start: 2022-06-24 | End: 2022-06-25 | Stop reason: HOSPADM

## 2022-06-24 RX ORDER — METOCLOPRAMIDE HYDROCHLORIDE 5 MG/ML
10 INJECTION INTRAMUSCULAR; INTRAVENOUS EVERY 6 HOURS PRN
Status: DISCONTINUED | OUTPATIENT
Start: 2022-06-24 | End: 2022-06-25 | Stop reason: HOSPADM

## 2022-06-24 RX ORDER — METRONIDAZOLE 7.5 MG/G
1 GEL VAGINAL AT BEDTIME
Qty: 25 G | Refills: 0 | Status: SHIPPED | OUTPATIENT
Start: 2022-06-24 | End: 2022-06-29

## 2022-06-25 ENCOUNTER — HOSPITAL ENCOUNTER (OUTPATIENT)
Facility: CLINIC | Age: 27
End: 2022-06-25
Admitting: OBSTETRICS & GYNECOLOGY
Payer: COMMERCIAL

## 2022-06-25 NOTE — DISCHARGE INSTRUCTIONS
Discharge Instruction for Undelivered Patients      You were seen for: Labor Assessment  We Consulted:   You had (Test or Medicine):Wet prep and FFN,     Diet:   Drink 8 to 12 glasses of liquids (milk, juice, water) every day.     Activity:  Call your doctor or nurse midwife if your baby is moving less than usual.     Call your provider if you notice:  Swelling in your face or increased swelling in your hands or legs.  Headaches that are not relieved by Tylenol (acetaminophen).  Changes in your vision (blurring: seeing spots or stars.)  Nausea (sick to your stomach) and vomiting (throwing up).   Weight gain of 5 pounds or more per week.  Heartburn that doesn't go away.  Signs of bladder infection: pain when you urinate (use the toilet), need to go more often and more urgently.  The bag of diaz (rupture of membranes) breaks, or you notice leaking in your underwear.  Bright red blood in your underwear.  Abdominal (lower belly) or stomach pain.  For first baby: Contractions (tightening) less than 5 minutes apart for one hour or more.  Second (plus) baby: Contractions (tightening) less than 10 minutes apart and getting stronger.  *If less than 34 weeks: Contractions (tightening) more than 6 times in one hour.  Increase or change in vaginal discharge (note the color and amount)  Other: Take Flagyl every night for 5 nights.    Follow-up:  As scheduled in the clinic

## 2022-06-25 NOTE — CONSULTS
OB TRIAGE EVALUATION    Patient Name: Dallas Palacios   Admit Date: 624  MR #: [id number]   Acct #: [id number]   : 1995     Chief Complaint/Reason for Visit: contractions, threatened  labor    History of Present Illness: Dallas Palacios is a 26 year old  at 25w6d here for evaluation of  contractions and concern for  labor. Pt called the emergency line earlier this evening noting that she has been having q4-8min mild but palpable ctx for the last 4-5hr now. She notes she has tried hydrating, eating food, resting, taking a hot shower, etc, but nothing is slowing/calming them. She notes very scant spotting but no LOF. Normal FM. She relates that in her 2nd pregnancy, she had a similar issue and was found to have dilated to 1cm at 28w and 3cm by 34w. She states she did receive BMZ at 34w in that pregnancy but never any tocolytics and ultimately delivered at 37w. She desires eval given her hx as well as the frequency and persistence of these ctx.    Review of Systems:  General: denies fevers  Abd: denies N/V  Gyn: denies vaginal discharge  OB: see HPI    History:   OB History    Para Term  AB Living   3 2 2 0 0 2   SAB IAB Ectopic Multiple Live Births   0 0 0 0 2      # Outcome Date GA Lbr Allan/2nd Weight Sex Delivery Anes PTL Lv   3 Current            2 Term 21 37w6d 03:40 / 00:11 2.89 kg (6 lb 5.9 oz) M Vag-Spont Local Y ALEA      Complications: Excessive Vaginal Bleeding      Name: ELIZABETHMALE-DALLAS      Apgar1: 8  Apgar5: 9   1 Term 19 40w2d 04:20 / 03:53 3.827 kg (8 lb 7 oz) M Vag-Vacuum EPI N ALEA      Birth Comments: followed by [facility] and delivered by [facility]. PROM, refused pit all day. finally agreed and got 2 mu and rapid 1st stage. pushed 3+ hours and then refuse and wanted a c/s. consulted and did vavd x2 pulls. compound pres. 3rd degree, left sulcus/right labial t      Name: Hira      Apgar1: 8  Apgar5: 9       Past Medical History:    Diagnosis Date     Depressive disorder      [place] (generalized anxiety disorder)      IBS (irritable bowel syndrome)      Other immediate postpartum hemorrhage 7/26/2019     Ovarian cyst      Third degree laceration of perineum during delivery, postpartum 7/26/2019     Vacuum extractor delivery, delivered 7/26/2019       Past Surgical History:   Procedure Laterality Date     ENT SURGERY      wisdom teeth extraction      wisdom teeth         Family History   Problem Relation Age of Onset     Cancer Mother      Breast Cancer Mother      Cancer Maternal Aunt      Breast Cancer Maternal Aunt      Thyroid Disease Maternal Grandmother      Breast Cancer Paternal Grandmother      No Known Problems Father      No Known Problems Sister      No Known Problems Brother      No Known Problems Maternal Grandfather      No Known Problems Other        Social History     Socioeconomic History     Marital status:      Spouse name: Not on file     Number of children: Not on file     Years of education: Not on file     Highest education level: Not on file   Occupational History     Not on file   Tobacco Use     Smoking status: Never Smoker     Smokeless tobacco: Never Used   Substance and Sexual Activity     Alcohol use: No     Drug use: No     Sexual activity: Yes     Partners: Male     Birth control/protection: None   Other Topics Concern     Parent/sibling w/ CABG, MI or angioplasty before 65F 55M? Not Asked   Social History Narrative     Not on file     Social Determinants of Health     Financial Resource Strain: Not on file   Food Insecurity: Not on file   Transportation Needs: Not on file   Physical Activity: Not on file   Stress: Not on file   Social Connections: Not on file   Intimate Partner Violence: Not on file   Housing Stability: Not on file       Allergy Information:        I have reviewed the patient's allergies.  Allergies   Allergen Reactions     Azithromycin Nausea and Vomiting     In high school   Other  reaction(s): GI Upset  Other reaction(s): Gastrointestinal  Other reaction(s): Vomiting     Penicillins Hives     Amoxicillin Hives and Rash     In high school         Home Medications:   No current outpatient medications on file.       Physical Examination:  Vitals:  Temp:  [98.2  F (36.8  C)] 98.2  F (36.8  C)  Resp:  [16] 16  BP: (114)/(62) 114/62    Exam:  General: no acute distress  Head: normocephalic, atraumatic  Eyes: EOMI  CV: pulses intact  Pulm: no increased effort  Neuro: CN II-XII grossly intact  Abd: gravid, soft, NTTP  Gyn: cvx closed  Skin: no rashes  MSK: no calf tenderness  Psych: AOx3, appropriate mood/affect    Fetus: FHT: 145, approp for GA. [place]: quiet    Laboratory and Additional Data Reviewed:  Any associated labs, path, imaging personally reviewed  Results for orders placed or performed during the hospital encounter of 22   UA reflex to Microscopic and Culture     Status: Normal    Specimen: Urine, Midstream   Result Value Ref Range    Color Urine Straw Colorless, Straw, Light Yellow, Yellow    Appearance Urine Clear Clear    Glucose Urine Negative Negative mg/dL    Bilirubin Urine Negative Negative    Ketones Urine Negative Negative mg/dL    Specific Gravity Urine 1.005 1.003 - 1.035    Blood Urine Negative Negative    pH Urine 7.0 5.0 - 7.0    Protein Albumin Urine Negative Negative mg/dL    Urobilinogen Urine Normal Normal, 2.0 mg/dL    Nitrite Urine Negative Negative    Leukocyte Esterase Urine Negative Negative    Narrative    Microscopic not indicated   Wet prep     Status: Abnormal    Specimen: Vagina; Swab   Result Value Ref Range    Trichomonas Absent Absent    Yeast Absent Absent    Clue Cells Present (A) Absent    WBCs/high power field 2+ (A) None         Assessment and Plan: Melina Palacios is a 26 year old  at 25w6d here for eval of  ctx, concern for poss PTL.  - FFN collected on arrival and returned negative. D/w pt ~1/100 chance of delivery in the next 2  weeks with a negative FFN, so this is fairly reassuring.  - Cvx closed  - Wet prep positive for clue cells. D/w pt BV and how that can trigger cramping/ctx, so will treat in context of this visit.  - UDip WNL  - [place] quiet  - Tracing appropriate for GA  - Okay for discharge home, will send Rx for Metrogel per pt preference. Pt reassured and agreeable      Robert Pavon MD  [facility] [place]  6/24/2022, 11:28 PM

## 2022-06-25 NOTE — PROGRESS NOTES
2219  Patient arrived to maternal assessment center ambulatory, with significant other, Kevin.    Patient reports reason for visit is  labor.  Patient to unable to void, room in MAC 3 to change into gown.      2220  Patient receives prenatal care with [facility] - history is per patient and care everywhere chart review.      G 3 P 2002    25 weeks 5 days gestation    Prenatal record unavailable reviewed.        Verbal consent for EFM.    EFM applied for fetal well being with consent.    Uterine assessment completed, non-tender and palpates soft.      Patient reports fetal movement is present.  Patient denies backache, vaginal discharge, pelvic pressure, UTI symptoms, GI problems, bloody show, vaginal bleeding, edema, headache, visual disturbances, epigastric or URQ pain, and/or rupture of membranes.      2240 FFN and wet prep collected.    Triage/Arrival assessment completed.    Cervical exam: 0/0/high.      2253  Dr Librado espinoza with return call received.  Update included SVE exam, labs pending.   2350 FFN negative, + Clue cells      2350  Patient given discharge instructions verbalizes understanding and patient signed after visit summary signature page.  Patient and Kevin in agreement with plan.  Patient instructed to report change in fetal movement, vaginal leaking of fluid or bleeding, abdominal pain, or any concerns related to the pregnancy to her physician or midwife.  Monitors removed for patient to dress.    2350 Discharged home, undelivered, ambulatory at 2350.

## 2022-08-04 ENCOUNTER — HOSPITAL ENCOUNTER (OUTPATIENT)
Facility: CLINIC | Age: 27
Discharge: HOME | End: 2022-08-04
Attending: OBSTETRICS & GYNECOLOGY | Admitting: OBSTETRICS & GYNECOLOGY
Payer: COMMERCIAL

## 2022-08-04 VITALS
WEIGHT: 152 LBS | BODY MASS INDEX: 23.04 KG/M2 | TEMPERATURE: 99.3 F | DIASTOLIC BLOOD PRESSURE: 64 MMHG | SYSTOLIC BLOOD PRESSURE: 122 MMHG | HEIGHT: 68 IN

## 2022-08-04 PROCEDURE — G0463 HOSPITAL OUTPT CLINIC VISIT: HCPCS | Mod: 25

## 2022-08-04 PROCEDURE — 59025 FETAL NON-STRESS TEST: CPT

## 2022-08-04 RX ORDER — PROCHLORPERAZINE MALEATE 5 MG
10 TABLET ORAL EVERY 6 HOURS PRN
Status: DISCONTINUED | OUTPATIENT
Start: 2022-08-04 | End: 2022-08-04 | Stop reason: HOSPADM

## 2022-08-04 RX ORDER — ONDANSETRON 2 MG/ML
4 INJECTION INTRAMUSCULAR; INTRAVENOUS EVERY 6 HOURS PRN
Status: DISCONTINUED | OUTPATIENT
Start: 2022-08-04 | End: 2022-08-04 | Stop reason: HOSPADM

## 2022-08-04 RX ORDER — ONDANSETRON 4 MG/1
4 TABLET, ORALLY DISINTEGRATING ORAL EVERY 6 HOURS PRN
Status: DISCONTINUED | OUTPATIENT
Start: 2022-08-04 | End: 2022-08-04 | Stop reason: HOSPADM

## 2022-08-04 RX ORDER — PROCHLORPERAZINE 25 MG
25 SUPPOSITORY, RECTAL RECTAL EVERY 12 HOURS PRN
Status: DISCONTINUED | OUTPATIENT
Start: 2022-08-04 | End: 2022-08-04 | Stop reason: HOSPADM

## 2022-08-04 RX ORDER — METOCLOPRAMIDE HYDROCHLORIDE 5 MG/ML
10 INJECTION INTRAMUSCULAR; INTRAVENOUS EVERY 6 HOURS PRN
Status: DISCONTINUED | OUTPATIENT
Start: 2022-08-04 | End: 2022-08-04 | Stop reason: HOSPADM

## 2022-08-04 RX ORDER — METOCLOPRAMIDE 10 MG/1
10 TABLET ORAL EVERY 6 HOURS PRN
Status: DISCONTINUED | OUTPATIENT
Start: 2022-08-04 | End: 2022-08-04 | Stop reason: HOSPADM

## 2022-08-04 NOTE — DISCHARGE INSTRUCTIONS
Discharge Instruction for Undelivered Patients      You were seen for:  Upper  Abdomen/Rib pain  We Consulted: Dr. Hernadez  You had (Test or Medicine):Fetal monitoring (Reactive NST)     Diet:   Drink 8 to 12 glasses of liquids (milk, juice, water) every day.  You may eat meals and snacks.     Activity:  Call your doctor or nurse midwife if your baby is moving less than usual.     Call your provider if you notice:  Swelling in your face or increased swelling in your hands or legs.  Headaches that are not relieved by Tylenol (acetaminophen).  Changes in your vision (blurring: seeing spots or stars.)  Nausea (sick to your stomach) and vomiting (throwing up).   Weight gain of 5 pounds or more per week.  Heartburn that doesn't go away.  Signs of bladder infection: pain when you urinate (use the toilet), need to go more often and more urgently.  The bag of diaz (rupture of membranes) breaks, or you notice leaking in your underwear.  Bright red blood in your underwear.  Abdominal (lower belly) or stomach pain.  For first baby: Contractions (tightening) less than 5 minutes apart for one hour or more.  Second (plus) baby: Contractions (tightening) less than 10 minutes apart and getting stronger.  *If less than 34 weeks: Contractions (tightening) more than 6 times in one hour.  Increase or change in vaginal discharge (note the color and amount)      Follow-up:  As scheduled in the clinic for follow up prenatal care.    Ice, Tylenol (650-1000mg every 4-6 hours), or seek Chiropractic care for rib pain. May be seen in ER if rib discomfort persists.

## 2022-08-04 NOTE — PLAN OF CARE
Patient comes to triage with c/o upper abdominal/rib pain. Pt had pneumonia a couple weeks ago.    EUM/US applied. VSS. Update to Dr. Hernadez on the unit regarding location of pain and normal Blood pressures. MD reviewed FHT tracing in MAC.    MD gave discharge order, and stated Pt could use Ice/Tylenol/Chiropractic to help with her Muscular skeletal discomfort.     Admission database updated, and prenatal record reviewed.    Discharge instructions given with verbal understanding, and patient discharged home.    Patient will follow up in clinic at regular appt, contact on-call MD with any further concerns, or follow up in ED if rib pain persists.

## 2022-08-10 ENCOUNTER — HOSPITAL ENCOUNTER (OUTPATIENT)
Facility: CLINIC | Age: 27
Discharge: HOME | End: 2022-08-10
Attending: OBSTETRICS & GYNECOLOGY | Admitting: OBSTETRICS & GYNECOLOGY
Payer: COMMERCIAL

## 2022-08-10 ENCOUNTER — HOSPITAL ENCOUNTER (OUTPATIENT)
Facility: CLINIC | Age: 27
End: 2022-08-10
Admitting: OBSTETRICS & GYNECOLOGY
Payer: COMMERCIAL

## 2022-08-10 VITALS
TEMPERATURE: 98.5 F | HEART RATE: 87 BPM | SYSTOLIC BLOOD PRESSURE: 117 MMHG | RESPIRATION RATE: 16 BRPM | DIASTOLIC BLOOD PRESSURE: 72 MMHG

## 2022-08-10 LAB
FFN SPECIMEN INTEGRITY: NORMAL
FIBRONECTIN FETAL VAG QL: NEGATIVE

## 2022-08-10 PROCEDURE — G0463 HOSPITAL OUTPT CLINIC VISIT: HCPCS | Mod: 25

## 2022-08-10 PROCEDURE — 59025 FETAL NON-STRESS TEST: CPT

## 2022-08-10 PROCEDURE — 82731 ASSAY OF FETAL FIBRONECTIN: CPT | Performed by: OBSTETRICS & GYNECOLOGY

## 2022-08-10 PROCEDURE — 250N000013 HC RX MED GY IP 250 OP 250 PS 637

## 2022-08-10 RX ORDER — ACETAMINOPHEN 325 MG/1
TABLET ORAL
Status: COMPLETED
Start: 2022-08-10 | End: 2022-08-10

## 2022-08-10 RX ORDER — ONDANSETRON 2 MG/ML
4 INJECTION INTRAMUSCULAR; INTRAVENOUS EVERY 6 HOURS PRN
Status: DISCONTINUED | OUTPATIENT
Start: 2022-08-10 | End: 2022-08-10 | Stop reason: HOSPADM

## 2022-08-10 RX ORDER — METOCLOPRAMIDE HYDROCHLORIDE 5 MG/ML
10 INJECTION INTRAMUSCULAR; INTRAVENOUS EVERY 6 HOURS PRN
Status: DISCONTINUED | OUTPATIENT
Start: 2022-08-10 | End: 2022-08-10 | Stop reason: HOSPADM

## 2022-08-10 RX ORDER — ONDANSETRON 4 MG/1
4 TABLET, ORALLY DISINTEGRATING ORAL EVERY 6 HOURS PRN
Status: DISCONTINUED | OUTPATIENT
Start: 2022-08-10 | End: 2022-08-10 | Stop reason: HOSPADM

## 2022-08-10 RX ORDER — ACETAMINOPHEN 325 MG/1
975 TABLET ORAL EVERY 6 HOURS PRN
Status: DISCONTINUED | OUTPATIENT
Start: 2022-08-10 | End: 2022-08-10 | Stop reason: HOSPADM

## 2022-08-10 RX ORDER — METOCLOPRAMIDE 10 MG/1
10 TABLET ORAL EVERY 6 HOURS PRN
Status: DISCONTINUED | OUTPATIENT
Start: 2022-08-10 | End: 2022-08-10 | Stop reason: HOSPADM

## 2022-08-10 RX ORDER — PROCHLORPERAZINE 25 MG
25 SUPPOSITORY, RECTAL RECTAL EVERY 12 HOURS PRN
Status: DISCONTINUED | OUTPATIENT
Start: 2022-08-10 | End: 2022-08-10 | Stop reason: HOSPADM

## 2022-08-10 RX ORDER — PROCHLORPERAZINE MALEATE 5 MG
10 TABLET ORAL EVERY 6 HOURS PRN
Status: DISCONTINUED | OUTPATIENT
Start: 2022-08-10 | End: 2022-08-10 | Stop reason: HOSPADM

## 2022-08-10 RX ADMIN — ACETAMINOPHEN 975 MG: 325 TABLET ORAL at 02:23

## 2022-08-10 ASSESSMENT — ACTIVITIES OF DAILY LIVING (ADL): ADLS_ACUITY_SCORE: 18

## 2022-08-10 NOTE — PLAN OF CARE
32+4 multip arrived stating she was having constant back pain and rectal/ vaginal pressure. External US and toco applied with patient permission. Patient stated she took 1 regular strength tylenol around 2 or 3 hours ago and has tried position changes and massage with no change in pain level. FHT's 130s, moderate variability with accelerations. [place] quiet, picking up some irritability as patient is up and moving.     Dr. Pavon paged for orders, received orders for a FFN and SVE. Updated on FHT's, toco quiet and uterus palpates soft, patients description of pain. If FFN is negative and cervix is closed, patient may be discharged.     0302 Dr. Pavon updated on closed cervix, FFN negative. Patient now describing pain as lower back pain that wraps around to her hips. Having some relief with 975mg of tylenol. Reviewed discharge instructions with patient, verbalized understanding of instructions and  labor precautions. Patient left ambulatory at 0320.

## 2022-08-10 NOTE — DISCHARGE INSTRUCTIONS
Discharge Instruction for Undelivered Patients      You were seen for:  labor evaluation  We Consulted: Dr. Pavon  You had (Test or Medicine): fetal and uterine monitoring, FFN (results negative) cervical exam     Diet:   Drink 8 to 12 glasses of liquids (milk, juice, water) every day.  You may eat meals and snacks.     Activity:  Count fetal kicks everyday (see handout)  Call your doctor or nurse midwife if your baby is moving less than usual.     Call your provider if you notice:  Swelling in your face or increased swelling in your hands or legs.  Headaches that are not relieved by Tylenol (acetaminophen).  Changes in your vision (blurring: seeing spots or stars.)  Nausea (sick to your stomach) and vomiting (throwing up).   Weight gain of 5 pounds or more per week.  Heartburn that doesn't go away.  Signs of bladder infection: pain when you urinate (use the toilet), need to go more often and more urgently.  The bag of diaz (rupture of membranes) breaks, or you notice leaking in your underwear.  Bright red blood in your underwear.  Abdominal (lower belly) or stomach pain.  Second (plus) baby: Contractions (tightening) less than 10 minutes apart and getting stronger.  *If less than 34 weeks: Contractions (tightening) more than 6 times in one hour.  Increase or change in vaginal discharge (note the color and amount)  Other:     Follow-up:  As scheduled in the clinic

## 2022-09-08 ENCOUNTER — HOSPITAL ENCOUNTER (OUTPATIENT)
Facility: CLINIC | Age: 27
Discharge: HOME | DRG: 833 | End: 2022-09-08
Attending: OBSTETRICS & GYNECOLOGY | Admitting: OBSTETRICS & GYNECOLOGY
Payer: COMMERCIAL

## 2022-09-08 VITALS
BODY MASS INDEX: 23.64 KG/M2 | SYSTOLIC BLOOD PRESSURE: 116 MMHG | TEMPERATURE: 98.3 F | HEIGHT: 68 IN | WEIGHT: 156 LBS | RESPIRATION RATE: 14 BRPM | DIASTOLIC BLOOD PRESSURE: 71 MMHG

## 2022-09-08 LAB
ABO/RH(D): NORMAL
ANTIBODY SCREEN: NEGATIVE
ERYTHROCYTE [DISTWIDTH] IN BLOOD BY AUTOMATED COUNT: 13.5 % (ref 10–15)
HCT VFR BLD AUTO: 33.3 % (ref 35–47)
HGB BLD-MCNC: 10.3 G/DL (ref 11.7–15.7)
MCH RBC QN AUTO: 24.6 PG (ref 26.5–33)
MCHC RBC AUTO-ENTMCNC: 30.9 G/DL (ref 31.5–36.5)
MCV RBC AUTO: 80 FL (ref 78–100)
PLATELET # BLD AUTO: 242 10E3/UL (ref 150–450)
RBC # BLD AUTO: 4.19 10E6/UL (ref 3.8–5.2)
SARS-COV-2 RNA RESP QL NAA+PROBE: NEGATIVE
SPECIMEN EXPIRATION DATE: NORMAL
T PALLIDUM AB SER QL: NONREACTIVE
WBC # BLD AUTO: 12.2 10E3/UL (ref 4–11)

## 2022-09-08 PROCEDURE — 87653 STREP B DNA AMP PROBE: CPT | Performed by: OBSTETRICS & GYNECOLOGY

## 2022-09-08 PROCEDURE — G0463 HOSPITAL OUTPT CLINIC VISIT: HCPCS | Mod: 25

## 2022-09-08 PROCEDURE — U0003 INFECTIOUS AGENT DETECTION BY NUCLEIC ACID (DNA OR RNA); SEVERE ACUTE RESPIRATORY SYNDROME CORONAVIRUS 2 (SARS-COV-2) (CORONAVIRUS DISEASE [COVID-19]), AMPLIFIED PROBE TECHNIQUE, MAKING USE OF HIGH THROUGHPUT TECHNOLOGIES AS DESCRIBED BY CMS-2020-01-R: HCPCS | Performed by: OBSTETRICS & GYNECOLOGY

## 2022-09-08 PROCEDURE — 250N000011 HC RX IP 250 OP 636: Performed by: OBSTETRICS & GYNECOLOGY

## 2022-09-08 PROCEDURE — 258N000003 HC RX IP 258 OP 636: Performed by: STUDENT IN AN ORGANIZED HEALTH CARE EDUCATION/TRAINING PROGRAM

## 2022-09-08 PROCEDURE — 120N000001 HC R&B MED SURG/OB

## 2022-09-08 PROCEDURE — 86850 RBC ANTIBODY SCREEN: CPT | Performed by: OBSTETRICS & GYNECOLOGY

## 2022-09-08 PROCEDURE — 86780 TREPONEMA PALLIDUM: CPT | Performed by: OBSTETRICS & GYNECOLOGY

## 2022-09-08 PROCEDURE — 86901 BLOOD TYPING SEROLOGIC RH(D): CPT | Performed by: OBSTETRICS & GYNECOLOGY

## 2022-09-08 PROCEDURE — 85027 COMPLETE CBC AUTOMATED: CPT | Performed by: OBSTETRICS & GYNECOLOGY

## 2022-09-08 PROCEDURE — 59025 FETAL NON-STRESS TEST: CPT

## 2022-09-08 RX ORDER — METOCLOPRAMIDE HYDROCHLORIDE 5 MG/ML
10 INJECTION INTRAMUSCULAR; INTRAVENOUS EVERY 6 HOURS PRN
Status: DISCONTINUED | OUTPATIENT
Start: 2022-09-08 | End: 2022-09-08 | Stop reason: HOSPADM

## 2022-09-08 RX ORDER — METOCLOPRAMIDE 10 MG/1
10 TABLET ORAL EVERY 6 HOURS PRN
Status: DISCONTINUED | OUTPATIENT
Start: 2022-09-08 | End: 2022-09-08 | Stop reason: HOSPADM

## 2022-09-08 RX ORDER — PROCHLORPERAZINE MALEATE 5 MG
10 TABLET ORAL EVERY 6 HOURS PRN
Status: DISCONTINUED | OUTPATIENT
Start: 2022-09-08 | End: 2022-09-08 | Stop reason: HOSPADM

## 2022-09-08 RX ORDER — KETOROLAC TROMETHAMINE 30 MG/ML
30 INJECTION, SOLUTION INTRAMUSCULAR; INTRAVENOUS
Status: DISCONTINUED | OUTPATIENT
Start: 2022-09-08 | End: 2022-09-08 | Stop reason: HOSPADM

## 2022-09-08 RX ORDER — ONDANSETRON 2 MG/ML
4 INJECTION INTRAMUSCULAR; INTRAVENOUS EVERY 6 HOURS PRN
Status: DISCONTINUED | OUTPATIENT
Start: 2022-09-08 | End: 2022-09-08 | Stop reason: HOSPADM

## 2022-09-08 RX ORDER — MISOPROSTOL 200 UG/1
400 TABLET ORAL
Status: DISCONTINUED | OUTPATIENT
Start: 2022-09-08 | End: 2022-09-08 | Stop reason: HOSPADM

## 2022-09-08 RX ORDER — CITRIC ACID/SODIUM CITRATE 334-500MG
30 SOLUTION, ORAL ORAL
Status: DISCONTINUED | OUTPATIENT
Start: 2022-09-08 | End: 2022-09-08 | Stop reason: HOSPADM

## 2022-09-08 RX ORDER — PROCHLORPERAZINE 25 MG
25 SUPPOSITORY, RECTAL RECTAL EVERY 12 HOURS PRN
Status: DISCONTINUED | OUTPATIENT
Start: 2022-09-08 | End: 2022-09-08 | Stop reason: HOSPADM

## 2022-09-08 RX ORDER — OXYTOCIN 10 [USP'U]/ML
10 INJECTION, SOLUTION INTRAMUSCULAR; INTRAVENOUS
Status: DISCONTINUED | OUTPATIENT
Start: 2022-09-08 | End: 2022-09-08 | Stop reason: HOSPADM

## 2022-09-08 RX ORDER — OXYTOCIN/0.9 % SODIUM CHLORIDE 30/500 ML
100-340 PLASTIC BAG, INJECTION (ML) INTRAVENOUS CONTINUOUS PRN
Status: DISCONTINUED | OUTPATIENT
Start: 2022-09-08 | End: 2022-09-08 | Stop reason: HOSPADM

## 2022-09-08 RX ORDER — SODIUM CHLORIDE, SODIUM LACTATE, POTASSIUM CHLORIDE, CALCIUM CHLORIDE 600; 310; 30; 20 MG/100ML; MG/100ML; MG/100ML; MG/100ML
INJECTION, SOLUTION INTRAVENOUS CONTINUOUS
Status: DISCONTINUED | OUTPATIENT
Start: 2022-09-08 | End: 2022-09-08 | Stop reason: HOSPADM

## 2022-09-08 RX ORDER — NALOXONE HYDROCHLORIDE 0.4 MG/ML
0.2 INJECTION, SOLUTION INTRAMUSCULAR; INTRAVENOUS; SUBCUTANEOUS
Status: DISCONTINUED | OUTPATIENT
Start: 2022-09-08 | End: 2022-09-08 | Stop reason: HOSPADM

## 2022-09-08 RX ORDER — MISOPROSTOL 200 UG/1
800 TABLET ORAL
Status: DISCONTINUED | OUTPATIENT
Start: 2022-09-08 | End: 2022-09-08 | Stop reason: HOSPADM

## 2022-09-08 RX ORDER — NALOXONE HYDROCHLORIDE 0.4 MG/ML
0.4 INJECTION, SOLUTION INTRAMUSCULAR; INTRAVENOUS; SUBCUTANEOUS
Status: DISCONTINUED | OUTPATIENT
Start: 2022-09-08 | End: 2022-09-08 | Stop reason: HOSPADM

## 2022-09-08 RX ORDER — IBUPROFEN 400 MG/1
800 TABLET, FILM COATED ORAL
Status: DISCONTINUED | OUTPATIENT
Start: 2022-09-08 | End: 2022-09-08 | Stop reason: HOSPADM

## 2022-09-08 RX ORDER — TRANEXAMIC ACID 10 MG/ML
1 INJECTION, SOLUTION INTRAVENOUS EVERY 30 MIN PRN
Status: DISCONTINUED | OUTPATIENT
Start: 2022-09-08 | End: 2022-09-08 | Stop reason: HOSPADM

## 2022-09-08 RX ORDER — OXYTOCIN/0.9 % SODIUM CHLORIDE 30/500 ML
340 PLASTIC BAG, INJECTION (ML) INTRAVENOUS CONTINUOUS PRN
Status: DISCONTINUED | OUTPATIENT
Start: 2022-09-08 | End: 2022-09-08 | Stop reason: HOSPADM

## 2022-09-08 RX ORDER — LIDOCAINE 40 MG/G
CREAM TOPICAL
Status: DISCONTINUED | OUTPATIENT
Start: 2022-09-08 | End: 2022-09-08 | Stop reason: HOSPADM

## 2022-09-08 RX ORDER — ONDANSETRON 4 MG/1
4 TABLET, ORALLY DISINTEGRATING ORAL EVERY 6 HOURS PRN
Status: DISCONTINUED | OUTPATIENT
Start: 2022-09-08 | End: 2022-09-08 | Stop reason: HOSPADM

## 2022-09-08 RX ORDER — METHYLERGONOVINE MALEATE 0.2 MG/ML
200 INJECTION INTRAVENOUS
Status: DISCONTINUED | OUTPATIENT
Start: 2022-09-08 | End: 2022-09-08 | Stop reason: HOSPADM

## 2022-09-08 RX ORDER — CLINDAMYCIN PHOSPHATE 900 MG/50ML
900 INJECTION, SOLUTION INTRAVENOUS EVERY 8 HOURS
Status: DISCONTINUED | OUTPATIENT
Start: 2022-09-08 | End: 2022-09-08 | Stop reason: HOSPADM

## 2022-09-08 RX ORDER — CARBOPROST TROMETHAMINE 250 UG/ML
250 INJECTION, SOLUTION INTRAMUSCULAR
Status: DISCONTINUED | OUTPATIENT
Start: 2022-09-08 | End: 2022-09-08 | Stop reason: HOSPADM

## 2022-09-08 RX ORDER — MULTIVITAMIN WITH IRON
1 TABLET ORAL DAILY
COMMUNITY

## 2022-09-08 RX ADMIN — SODIUM CHLORIDE, POTASSIUM CHLORIDE, SODIUM LACTATE AND CALCIUM CHLORIDE: 600; 310; 30; 20 INJECTION, SOLUTION INTRAVENOUS at 13:41

## 2022-09-08 RX ADMIN — CLINDAMYCIN PHOSPHATE 900 MG: 900 INJECTION, SOLUTION INTRAVENOUS at 03:09

## 2022-09-08 RX ADMIN — CLINDAMYCIN PHOSPHATE 900 MG: 900 INJECTION, SOLUTION INTRAVENOUS at 11:02

## 2022-09-08 ASSESSMENT — ACTIVITIES OF DAILY LIVING (ADL)
ADLS_ACUITY_SCORE: 18

## 2022-09-08 NOTE — PROGRESS NOTES
"Labor Progress note:    S: pt in the room sleeping. States that contractions unchanged and resting in between     O:/80   Temp 98.6  F (37  C)   Ht 1.727 m (5' 8\")   Wt 70.8 kg (156 lb)   LMP 2021   Breastfeeding No   BMI 23.72 kg/m     Cervix /70/-2 unchanged  FHT: Category I  [place]: q 5-8 minutes    A/P: at 36w5d   Offered intervention including AROM and pt declines  States that she wants to rest and will ambulate shortly  Continue care    Patrick Pennington MD   "

## 2022-09-08 NOTE — PROVIDER NOTIFICATION
Dr. Vo at bedside, SVE done, cervix unchanged from and discussed options with pt and .  Pt and  wanted some time to discuss privately to make a decision.  Pt called RN and MD back into room.  Further questions answered.  Pt and  wish to go home and have a follow up appt tomorrow with Dr. Vo in clinic at 1120 am.  Labor precautions reviewed with them by MD.   Her  plans to go home first and settle their children into bed and come back to pick pt up around [id number].   Will remove IV and monitors.

## 2022-09-08 NOTE — PROGRESS NOTES
"L&D Progress Notes    S: Melina is doing well, feels her contractions are still every five minutes but have not changed in intensity since admission. She was able to sleep comfortably for an hour this morning and has been up walking around her room and around the nursing station. +FM, denies increased pelvic pressure, leaking of fluid, or vaginal bleding.    O:  /80   Temp 98.6  F (37  C)   Ht 1.727 m (5' 8\")   Wt 70.8 kg (156 lb)   LMP 2021   Breastfeeding No   BMI 23.72 kg/m     General: appears comfortable, no indication of discomfort with contractions  SVE: /-2    A/P:  Melina Palacios is a 25yo  with IUP at 36w5d who is admitted for management of  labor.     labor  - continue GBS prophylaxis for GBS unknown and   - given r/b, late  steroids withheld   - cervical exam unchanged from admission, continue expectant management at this time. Plan to recheck cervix by me this afternoon. Pt to notify nursing staff if she experiencing increased pain with contractions, pelvic pressure, LOF, or vaginal bleeding, cervical exam indicated at that time.  - pt planning for nitrous for pain control, hx negative experience with epidural  - Lilia Davidson, at bedside. Pt with history of traumatic birth experiences and pregnancies complicated by bleeding. Focus on communication throughout the delivery in attempt to avoid in this delivery.    Dispo: continue inpatient expectant management of  labor    Ledy Vo MD   OBGYN, [facility]  22 8:21 AM   "

## 2022-09-08 NOTE — PLAN OF CARE
MD was here and discussed POC.  Pt ok with this.  MD plans to return by 1700 and reevaluate pt and plan.  Pt had wanted to shower, but now wants to take a nap.  Up to void, then settled into bed to rest.

## 2022-09-08 NOTE — H&P
2022    Dallas Palacios  [id number]            OB Admit History & Physical    CC: contractions     HPI:  at 36w5d here for contractions starting around 11 pm. Pt called the on call line at 1246 on 22 c/o contractions after having intercourse. Contractions were every 5 minute and not terribly intense. She elected to monitor her contractions for a bit longer and they continued to increase in intensity and frequency thus reporting to MAC for evaluation. Contractions increased in intensity when in the MAC. Cervix changed from initial 5 cm to 6 cm and contractions q 2-3 minutes  She denies lof, vb.   She is opting for nitrous oxide for pain control  Pregnancy has been complicated by  contractions for which she has been evaluated at 24 and 32 weeks. This was consistent with her other pregnancy history with  labor with term deliveries.  She has a hx of pregnancy- related trauma and anxiety related to previous delivery history. She was seeing therapy during pregnancy. She has a  to assist with management in this delivery. We will prepare for bleeding at the time of delivery.  Pt had pneumonia at 29 weeks treated with azithromycin.  She did genetic testing showing decreased risk for aneuploidy    Her OB history:   OB History    Para Term  AB Living   3 2 2 0 0 2   SAB IAB Ectopic Multiple Live Births   0 0 0 0 2      # Outcome Date GA Lbr Allan/2nd Weight Sex Delivery Anes PTL Lv   3 Current            2 Term 21 37w6d 03:40 / 00:11 2.89 kg (6 lb 5.9 oz) M Vag-Spont Local Y ALEA      Complications: Excessive Vaginal Bleeding      Name: ELIZABETHMALE-DALLAS      Apgar1: 8  Apgar5: 9   1 Term 19 40w2d 04:20 / 03:53 3.827 kg (8 lb 7 oz) M Vag-Vacuum EPI N ALEA      Birth Comments: followed by [facility] and delivered by [facility]. PROM, refused pit all day. finally agreed and got 2 mu and rapid 1st stage. pushed 3+ hours and then refuse and wanted a c/s. consulted and  did vavd x2 pulls. compound pres. 3rd degree, left sulcus/right labial t      Name: Hira      Apgar1: 8  Apgar5: 9            Past Medical History:   Diagnosis Date     Depressive disorder      [place] (generalized anxiety disorder)      IBS (irritable bowel syndrome)      Other immediate postpartum hemorrhage 7/26/2019     Ovarian cyst      Third degree laceration of perineum during delivery, postpartum 7/26/2019     Vacuum extractor delivery, delivered 7/26/2019          Past Surgical History:   Procedure Laterality Date     ENT SURGERY      wisdom teeth extraction      wisdom teeth           No current outpatient medications on file.       Allergies: Penicillins, Amoxicillin, and Wound dressing adhesive      REVIEW OF SYSTEMS:  NEUROLOGIC:  Negative  EYES:  Negative  ENT:  Negative  GI:  Negative  BREAST:  Negative  :  Negative  GYN:  Negative  CV:  Negative  PULMONARY:  Negative  MUSCULOSKELETAL:  Negative  PSYCH:  Negative        Social History     Socioeconomic History     Marital status:      Spouse name: Not on file     Number of children: Not on file     Years of education: Not on file     Highest education level: Not on file   Occupational History     Not on file   Tobacco Use     Smoking status: Never Smoker     Smokeless tobacco: Never Used   Substance and Sexual Activity     Alcohol use: No     Drug use: No     Sexual activity: Yes     Partners: Male     Birth control/protection: None   Other Topics Concern     Parent/sibling w/ CABG, MI or angioplasty before 65F 55M? Not Asked   Social History Narrative     Not on file     Social Determinants of Health     Financial Resource Strain: Not on file   Food Insecurity: Not on file   Transportation Needs: Not on file   Physical Activity: Not on file   Stress: Not on file   Social Connections: Not on file   Intimate Partner Violence: Not on file   Housing Stability: Not on file      Family History   Problem Relation Age of Onset     Cancer Mother       "Breast Cancer Mother      Cancer Maternal Aunt      Breast Cancer Maternal Aunt      Thyroid Disease Maternal Grandmother      Breast Cancer Paternal Grandmother      No Known Problems Father      No Known Problems Sister      No Known Problems Brother      No Known Problems Maternal Grandfather      No Known Problems Other              Vitals:   /80   Temp 98.6  F (37  C)   Ht 1.727 m (5' 8\")   Wt 70.8 kg (156 lb)   LMP 2021   Breastfeeding No   BMI 23.72 kg/m     , mod [place], reactive  [place]: With contractions every  2-5 min    Alert Awake in NAD  HEENT grossly normal  Neck: no lymphadenopathy or thryoidomegaly  ABD gravid,  Pelvic:  no fluid noted, no blood noted  Cervix is 6 cm / 70 % effaced at -2 station, per admitting RN. Pt refuses cervical exam by me  EXT:  no edema or calf tenderness  Neuro:  intact    Assessment:  IUP at 36w5d    labor  GBS unknown  Covid negative  Hx of birth trauma    Plan:  Admit to labor and delivery  Planning on Nitrous oxide for pain relief  Start abx for GBS prophylaxis- she did not have GBS in previous pregnancy  Continue care for anticipated vaginal delivery  Aware of birth trauma hx. Focus on communication throughout the delivery in attempt to avoid in this delivery      Patrick Pennington MD MD  Dept of OB/GYN  2022   "

## 2022-09-08 NOTE — DISCHARGE INSTRUCTIONS
Discharge Instruction for Undelivered Patients      You were seen for: Labor Assessment  We Consulted: Dr. Pennington and Dr. Vo  You had (Test or Medicine):continuous monitoring, several cervical exams (unchanged), IV fluids and IV antibiotics, GBS swab (pending)    Diet:   Drink 8 to 12 glasses of liquids (milk, juice, water) every day.  You may eat meals and snacks.     Activity:  Call your doctor or nurse midwife if your baby is moving less than usual.     Call your provider if you notice:  pre eclampsia symptoms:       Swelling in your face or increased swelling in your hands or legs.       Headaches that are not relieved by Tylenol (acetaminophen).       Changes in your vision (blurring: seeing spots or stars.)       Nausea (sick to your stomach) and vomiting (throwing up).        Weight gain of 5 pounds or more per week.       Heartburn that doesn't go away.  Signs of bladder infection: pain when you urinate (use the toilet), need to go more often and more urgently.  The bag of diaz (rupture of membranes) breaks, or you notice leaking in your underwear.  Bright red blood in your underwear.  Abdominal (lower belly) or stomach pain.  Second (plus) baby: Contractions (tightening) less than 10 minutes apart and getting stronger then how they have felt today.  Increase or change in vaginal discharge (note the color and amount)      Follow-up:  Tomorrow (Friday 9/9/22)  in clinic at 1120 am with Dr. Vo

## 2022-09-08 NOTE — PROGRESS NOTES
"L&D Progress Notes    S: Melina has continued to feel well throughout the day. She walked a couple of times throughout the day, sat on the labor ball for a while, then was able to nap several times this afternoon. Continues to feel intermittent cramping but no change in intensity or frequency of contractions. Continues to endorse good fetal movement, denies leaking of fluid or vaginal bleeding.    O:  /71 (BP Location: Right arm, Patient Position: Sitting, Cuff Size: Adult Regular)   Temp 98.3  F (36.8  C) (Temporal)   Resp 14   Ht 1.727 m (5' 8\")   Wt 70.8 kg (156 lb)   LMP 2021   Breastfeeding No   BMI 23.72 kg/m     General: appears comfortable, no indication of discomfort with contractions  SVE: /-2    A/P:  Melina Palacios is a 27yo  with IUP at 36w5d who is admitted for management of  labor.    Advanced cervical dilation  - cervical exam initially changed on initial presentation and now has remained unchanged since 023. Her cervical exam was /-2 by me at 0715 and unchanged on my exam at 1730.  - Discussed that with no change in cervical dilation for 15 hours, can say that she is not currently in labor and would not recommend augmentation at this time given  status, persistently reassuring fetal status, and reassuring maternal status without signs/symptoms of infection. She did not receive any tocolytics or steroids during her hospitalization.   - Discussed risks and benefits of continued inpatient observation vs outpatient observation. Discussed unable to predict when she will progress in labor and ultimately deliver, however am reassured that she has been stable and unchanged throughout the day. Discussed that her baby is cephalic, she is not planning on an epidural for pain control, she lives 7 minutes from the hospital, and has reliable transportation via personal car with  as . After extensive discussion, Melina and her  opt for " discharge to home with follow up appointment in the office tomorrow morning. Planning for appointment around 11 or 1130am with me.  - GBS unknown, GBS swab collected on admission and currently pending. If pt returns in labor over night, plan for clindamycin for GBS prophylaxis.   - of note, pt desires IV to not be located in her hand on readmission.    Dispo: stable for discharge to home. Return precautions reviewed in detail. All questions answered to the patient's satisfaction.     Ledy Vo MD   [facility], [facility]  09/08/22 6:32 PM

## 2022-09-08 NOTE — PROGRESS NOTES
0710 Tavo STEVENS at bedside to assess patient. SVe with consent with get baseline exam- 6/70/-2    Tavo STEVENS wants continuous monitoring, but okay for patient to walk the halls for about 15 mins q hour if desired.   Tavo STEVENS will not check on patient until after clinic unless necessary.     To re-examine progress later in the afternoon. Patient okay with plan.     0728 Report to Pereira F RN to assume primary care. Nitrous demand valve given to Pereira RN.

## 2022-09-08 NOTE — PLAN OF CARE
Difficult monitoring fhts as pt sitting up on birthing ball and leans over bed during contractions.  RN cont at bedside readjusting and holding monitor on pt.  Initially picking up short period of fht in 160-180.  Pt ok with an IV fluid bolus to treat potential fetal tachycardia.  IV bolus started.  Monitor readjusted frequently.  Now currently tracing better and fht baseline 150 with notable accels to 170-180.   Pt wishes to ambulate in de paz short period of time after IVF complete.   Pt states some UC feelling stronger than others and stronger since admission.  Pt tolerating UC well and is coping.

## 2022-09-08 NOTE — PROGRESS NOTES
0135  Patient arrived to maternal assessment center ambulatory, alone.    Patient reports reason for visit is contractions ever 4-6 mins starting at 2300.  Patient to MAC room 1 to change into gown.      0135  Patient receives prenatal care with [facility] - history is per patient and care everywhere chart review.      G 3 P 2    36 weeks 5 days gestation    Prenatal record reviewed.        Verbal consent for EFM.    EFM applied for fetal well being with consent.    Uterine assessment completed, non-tender and palpates soft between contractions.      Patient reports fetal movement is present.  Patient denies backache, vaginal discharge, pelvic pressure, UTI symptoms, GI problems, bloody show, vaginal bleeding, edema, headache, visual disturbances, epigastric or URQ pain, and/or rupture of membranes.      Triage/Arrival assessment completed.    0140 Cervical exam: /.    023 SVE is -    0235  Dr Gorge espinoza with return call received.  Update included but not limited to: Patients arrival, patient presents to maternal assessment center.  She is a .  36.5 gestation.  Obstetrical history significant for  labor with term deliveries, PPH with first delivery.  Fetal heart tones with a 135 baseline, moderate variability, accelerations 15x15 present, no decelerations noted.  Contractions every 2 to 6 minutes.  Cervix changed.  Patient plans for nitrious for pain control.  Patient is GBS unknown, collected at 0205.     Plan per provider is to admit to labor and delivery, utilizing intrapartum orderset.      May use acetaminophen, nitrous oxide, fentanyl, and/or epidural as needed/desired for labor pain relief.    Collect labs of CBC with platelets & differential and Type & Screen.  May use intermittent auscultation as long as patient meets criteria.     Patient may be up ad jennie.      Treat unknown GBS with clindamycin.        0245  Patient to room 218 ambulatory with significant other, Kevin and  personal belongings.      0245  Report to Yoon REVELES RN care transferred at this time.

## 2022-09-08 NOTE — PLAN OF CARE
FHTs back to 150s.  UC have spaced way out while resting in bed.   stated that pt took a good nap for past 30 min.  Pt stated not feeling as many contractions anymore and wants to know the next step.  Pt wishes for cervical exam and wants to take a walk in halls.

## 2022-09-09 LAB — GP B STREP DNA SPEC QL NAA+PROBE: NEGATIVE

## 2022-09-09 NOTE — PROGRESS NOTES
Patient's ride arrived to hospital. Patient declined assistance with leaving hospital. Patient left, ambulatory. Instructed to call provider ASAP if she notices labor changes and come to hospital.

## 2022-09-09 NOTE — PLAN OF CARE
Pt resting in bed on her side.  No complaints or concerns.  Discharge paperwork given and reviewed with patient.  No further questions.  Pt to call RN when her  is on his way to pick her up and will take her to door 6 per wc.  Will give report to Yoon REVELES RN who will assume cares until pt leaves hospital.    pt h&h=8.2=27.0

## 2022-09-15 NOTE — DISCHARGE SUMMARY
[facility] Discharge Summary    Melina Palacios MRN# [id number]   Age: 26 year old YOB: 1995     Date of Admission:  2022  Date of Discharge::  2022  8:22 PM   Admitting Physician:  Patrick Pennington MD  Discharge Physician:  Ledy Vo MD     Home clinic: [facility]          Admission Diagnoses:   Single IUP at 36w5d   Labor          Discharge Diagnosis:   Advanced cervical dilation          Procedures:   Procedure(s): none       No procedures performed during this admission           Medications Prior to Admission:     No medications prior to admission.             Discharge Medications:     Discharge Medication List as of 2022  6:36 PM      CONTINUE these medications which have NOT CHANGED    Details   calcium carbonate (TUMS) 500 MG chewable tablet Take 1 chew tab by mouth 2 times daily, Historical      magnesium 250 MG tablet Take 1 tablet by mouth daily, Historical      Prenat w/o I-RY-Ldhtyre-FA-DHA (PNV-DHA PO) Historical                   Consultations:   No consultations were requested during this admission                Hospital Course:   Mel[place] [facility] [place] was noted to be 5cm dilated, her cervical exam changed to 6cm dilated with contractions every 2-3 minutes. She was admitted for expectant management of  labor. She was on L&D for >15 hours with no additional cervical change. Her cervix remained unchanged at 6/70/-2 with irregular uterine contractions. She was counseled on the risks/benefits of active management vs expectant management with discharge to home. Pt elects for the latter. She lives 7 mintues from the hospital and has reliable transportation. Plan for her to f/u with me in clinic tomorrow             Discharge Instructions and Follow-Up:   Discharge diet: Regular   Discharge activity: Activity as tolerated   Discharge follow-up: Follow up with primary care provider in 1 days   Wound care: N/A            Discharge Disposition:   Discharged to home      Attestation:  I have reviewed today's vital signs, notes, medications, labs and imaging.  Amount of time performed on this discharge summary: 3 minutes.    Ledy Vo MD

## 2022-09-18 ENCOUNTER — HEALTH MAINTENANCE LETTER (OUTPATIENT)
Age: 27
End: 2022-09-18

## 2022-09-20 ENCOUNTER — HOSPITAL ENCOUNTER (INPATIENT)
Facility: CLINIC | Age: 27
LOS: 2 days | Discharge: HOME HEALTH | End: 2022-09-22
Attending: OBSTETRICS & GYNECOLOGY | Admitting: OBSTETRICS & GYNECOLOGY
Payer: COMMERCIAL

## 2022-09-20 PROBLEM — Z37.9 NORMAL LABOR: Status: ACTIVE | Noted: 2022-09-20

## 2022-09-20 LAB — SARS-COV-2 RNA RESP QL NAA+PROBE: NEGATIVE

## 2022-09-20 PROCEDURE — 59025 FETAL NON-STRESS TEST: CPT

## 2022-09-20 PROCEDURE — 120N000001 HC R&B MED SURG/OB

## 2022-09-20 PROCEDURE — G0463 HOSPITAL OUTPT CLINIC VISIT: HCPCS | Mod: 25

## 2022-09-20 PROCEDURE — 10907ZC DRAINAGE OF AMNIOTIC FLUID, THERAPEUTIC FROM PRODUCTS OF CONCEPTION, VIA NATURAL OR ARTIFICIAL OPENING: ICD-10-PCS | Performed by: OBSTETRICS & GYNECOLOGY

## 2022-09-20 PROCEDURE — C9803 HOPD COVID-19 SPEC COLLECT: HCPCS

## 2022-09-20 PROCEDURE — 258N000003 HC RX IP 258 OP 636: Performed by: OBSTETRICS & GYNECOLOGY

## 2022-09-20 PROCEDURE — U0003 INFECTIOUS AGENT DETECTION BY NUCLEIC ACID (DNA OR RNA); SEVERE ACUTE RESPIRATORY SYNDROME CORONAVIRUS 2 (SARS-COV-2) (CORONAVIRUS DISEASE [COVID-19]), AMPLIFIED PROBE TECHNIQUE, MAKING USE OF HIGH THROUGHPUT TECHNOLOGIES AS DESCRIBED BY CMS-2020-01-R: HCPCS | Performed by: OBSTETRICS & GYNECOLOGY

## 2022-09-20 RX ORDER — ONDANSETRON 2 MG/ML
4 INJECTION INTRAMUSCULAR; INTRAVENOUS EVERY 6 HOURS PRN
Status: DISCONTINUED | OUTPATIENT
Start: 2022-09-20 | End: 2022-09-20 | Stop reason: HOSPADM

## 2022-09-20 RX ORDER — IBUPROFEN 400 MG/1
800 TABLET, FILM COATED ORAL
Status: DISCONTINUED | OUTPATIENT
Start: 2022-09-20 | End: 2022-09-21

## 2022-09-20 RX ORDER — ONDANSETRON 4 MG/1
4 TABLET, ORALLY DISINTEGRATING ORAL EVERY 6 HOURS PRN
Status: DISCONTINUED | OUTPATIENT
Start: 2022-09-20 | End: 2022-09-21 | Stop reason: HOSPADM

## 2022-09-20 RX ORDER — PROCHLORPERAZINE MALEATE 5 MG
10 TABLET ORAL EVERY 6 HOURS PRN
Status: DISCONTINUED | OUTPATIENT
Start: 2022-09-20 | End: 2022-09-20 | Stop reason: HOSPADM

## 2022-09-20 RX ORDER — CARBOPROST TROMETHAMINE 250 UG/ML
250 INJECTION, SOLUTION INTRAMUSCULAR
Status: DISCONTINUED | OUTPATIENT
Start: 2022-09-20 | End: 2022-09-21 | Stop reason: HOSPADM

## 2022-09-20 RX ORDER — OXYTOCIN/0.9 % SODIUM CHLORIDE 30/500 ML
340 PLASTIC BAG, INJECTION (ML) INTRAVENOUS CONTINUOUS PRN
Status: DISCONTINUED | OUTPATIENT
Start: 2022-09-20 | End: 2022-09-21 | Stop reason: HOSPADM

## 2022-09-20 RX ORDER — ONDANSETRON 2 MG/ML
4 INJECTION INTRAMUSCULAR; INTRAVENOUS EVERY 6 HOURS PRN
Status: DISCONTINUED | OUTPATIENT
Start: 2022-09-20 | End: 2022-09-21 | Stop reason: HOSPADM

## 2022-09-20 RX ORDER — MISOPROSTOL 200 UG/1
400 TABLET ORAL
Status: DISCONTINUED | OUTPATIENT
Start: 2022-09-20 | End: 2022-09-21 | Stop reason: HOSPADM

## 2022-09-20 RX ORDER — MISOPROSTOL 200 UG/1
800 TABLET ORAL
Status: DISCONTINUED | OUTPATIENT
Start: 2022-09-20 | End: 2022-09-21 | Stop reason: HOSPADM

## 2022-09-20 RX ORDER — FENTANYL CITRATE 50 UG/ML
50 INJECTION, SOLUTION INTRAMUSCULAR; INTRAVENOUS EVERY 30 MIN PRN
Status: DISCONTINUED | OUTPATIENT
Start: 2022-09-20 | End: 2022-09-21 | Stop reason: HOSPADM

## 2022-09-20 RX ORDER — PROCHLORPERAZINE MALEATE 5 MG
10 TABLET ORAL EVERY 6 HOURS PRN
Status: DISCONTINUED | OUTPATIENT
Start: 2022-09-20 | End: 2022-09-21 | Stop reason: HOSPADM

## 2022-09-20 RX ORDER — NALOXONE HYDROCHLORIDE 0.4 MG/ML
0.4 INJECTION, SOLUTION INTRAMUSCULAR; INTRAVENOUS; SUBCUTANEOUS
Status: DISCONTINUED | OUTPATIENT
Start: 2022-09-20 | End: 2022-09-21 | Stop reason: HOSPADM

## 2022-09-20 RX ORDER — METOCLOPRAMIDE HYDROCHLORIDE 5 MG/ML
10 INJECTION INTRAMUSCULAR; INTRAVENOUS EVERY 6 HOURS PRN
Status: DISCONTINUED | OUTPATIENT
Start: 2022-09-20 | End: 2022-09-21 | Stop reason: HOSPADM

## 2022-09-20 RX ORDER — PROCHLORPERAZINE 25 MG
25 SUPPOSITORY, RECTAL RECTAL EVERY 12 HOURS PRN
Status: DISCONTINUED | OUTPATIENT
Start: 2022-09-20 | End: 2022-09-21 | Stop reason: HOSPADM

## 2022-09-20 RX ORDER — NALOXONE HYDROCHLORIDE 0.4 MG/ML
0.2 INJECTION, SOLUTION INTRAMUSCULAR; INTRAVENOUS; SUBCUTANEOUS
Status: DISCONTINUED | OUTPATIENT
Start: 2022-09-20 | End: 2022-09-21 | Stop reason: HOSPADM

## 2022-09-20 RX ORDER — ONDANSETRON 4 MG/1
4 TABLET, ORALLY DISINTEGRATING ORAL EVERY 6 HOURS PRN
Status: DISCONTINUED | OUTPATIENT
Start: 2022-09-20 | End: 2022-09-20 | Stop reason: HOSPADM

## 2022-09-20 RX ORDER — CITRIC ACID/SODIUM CITRATE 334-500MG
30 SOLUTION, ORAL ORAL
Status: DISCONTINUED | OUTPATIENT
Start: 2022-09-20 | End: 2022-09-21 | Stop reason: HOSPADM

## 2022-09-20 RX ORDER — OXYTOCIN 10 [USP'U]/ML
10 INJECTION, SOLUTION INTRAMUSCULAR; INTRAVENOUS
Status: DISCONTINUED | OUTPATIENT
Start: 2022-09-20 | End: 2022-09-21

## 2022-09-20 RX ORDER — KETOROLAC TROMETHAMINE 30 MG/ML
30 INJECTION, SOLUTION INTRAMUSCULAR; INTRAVENOUS
Status: DISCONTINUED | OUTPATIENT
Start: 2022-09-20 | End: 2022-09-21

## 2022-09-20 RX ORDER — METOCLOPRAMIDE HYDROCHLORIDE 5 MG/ML
10 INJECTION INTRAMUSCULAR; INTRAVENOUS EVERY 6 HOURS PRN
Status: DISCONTINUED | OUTPATIENT
Start: 2022-09-20 | End: 2022-09-20 | Stop reason: HOSPADM

## 2022-09-20 RX ORDER — METOCLOPRAMIDE 10 MG/1
10 TABLET ORAL EVERY 6 HOURS PRN
Status: DISCONTINUED | OUTPATIENT
Start: 2022-09-20 | End: 2022-09-20 | Stop reason: HOSPADM

## 2022-09-20 RX ORDER — OXYTOCIN/0.9 % SODIUM CHLORIDE 30/500 ML
100-340 PLASTIC BAG, INJECTION (ML) INTRAVENOUS CONTINUOUS PRN
Status: DISCONTINUED | OUTPATIENT
Start: 2022-09-20 | End: 2022-09-21

## 2022-09-20 RX ORDER — PROCHLORPERAZINE 25 MG
25 SUPPOSITORY, RECTAL RECTAL EVERY 12 HOURS PRN
Status: DISCONTINUED | OUTPATIENT
Start: 2022-09-20 | End: 2022-09-20 | Stop reason: HOSPADM

## 2022-09-20 RX ORDER — METHYLERGONOVINE MALEATE 0.2 MG/ML
200 INJECTION INTRAVENOUS
Status: DISCONTINUED | OUTPATIENT
Start: 2022-09-20 | End: 2022-09-21 | Stop reason: HOSPADM

## 2022-09-20 RX ORDER — TRANEXAMIC ACID 10 MG/ML
1 INJECTION, SOLUTION INTRAVENOUS EVERY 30 MIN PRN
Status: DISCONTINUED | OUTPATIENT
Start: 2022-09-20 | End: 2022-09-21 | Stop reason: HOSPADM

## 2022-09-20 RX ORDER — OXYTOCIN 10 [USP'U]/ML
10 INJECTION, SOLUTION INTRAMUSCULAR; INTRAVENOUS
Status: DISCONTINUED | OUTPATIENT
Start: 2022-09-20 | End: 2022-09-21 | Stop reason: HOSPADM

## 2022-09-20 RX ORDER — METOCLOPRAMIDE 10 MG/1
10 TABLET ORAL EVERY 6 HOURS PRN
Status: DISCONTINUED | OUTPATIENT
Start: 2022-09-20 | End: 2022-09-21 | Stop reason: HOSPADM

## 2022-09-20 RX ORDER — ACETAMINOPHEN 325 MG/1
650 TABLET ORAL EVERY 4 HOURS PRN
Status: DISCONTINUED | OUTPATIENT
Start: 2022-09-20 | End: 2022-09-21 | Stop reason: HOSPADM

## 2022-09-20 RX ADMIN — SODIUM CHLORIDE, POTASSIUM CHLORIDE, SODIUM LACTATE AND CALCIUM CHLORIDE 1000 ML: 600; 310; 30; 20 INJECTION, SOLUTION INTRAVENOUS at 22:30

## 2022-09-20 ASSESSMENT — ACTIVITIES OF DAILY LIVING (ADL)
ADLS_ACUITY_SCORE: 18
ADLS_ACUITY_SCORE: 18

## 2022-09-21 PROCEDURE — 722N000001 HC LABOR CARE VAGINAL DELIVERY SINGLE

## 2022-09-21 PROCEDURE — 120N000012 HC R&B POSTPARTUM

## 2022-09-21 PROCEDURE — 250N000013 HC RX MED GY IP 250 OP 250 PS 637: Performed by: OBSTETRICS & GYNECOLOGY

## 2022-09-21 PROCEDURE — 0KQM0ZZ REPAIR PERINEUM MUSCLE, OPEN APPROACH: ICD-10-PCS | Performed by: OBSTETRICS & GYNECOLOGY

## 2022-09-21 PROCEDURE — 250N000009 HC RX 250: Performed by: OBSTETRICS & GYNECOLOGY

## 2022-09-21 RX ORDER — IBUPROFEN 400 MG/1
800 TABLET, FILM COATED ORAL EVERY 6 HOURS PRN
Status: DISCONTINUED | OUTPATIENT
Start: 2022-09-21 | End: 2022-09-22 | Stop reason: HOSPADM

## 2022-09-21 RX ORDER — MISOPROSTOL 200 UG/1
800 TABLET ORAL
Status: DISCONTINUED | OUTPATIENT
Start: 2022-09-21 | End: 2022-09-22 | Stop reason: HOSPADM

## 2022-09-21 RX ORDER — MODIFIED LANOLIN
OINTMENT (GRAM) TOPICAL
Status: DISCONTINUED | OUTPATIENT
Start: 2022-09-21 | End: 2022-09-22 | Stop reason: HOSPADM

## 2022-09-21 RX ORDER — DOCUSATE SODIUM 100 MG/1
100 CAPSULE, LIQUID FILLED ORAL DAILY
Status: DISCONTINUED | OUTPATIENT
Start: 2022-09-21 | End: 2022-09-22 | Stop reason: HOSPADM

## 2022-09-21 RX ORDER — ACETAMINOPHEN 325 MG/1
650 TABLET ORAL EVERY 4 HOURS PRN
Status: DISCONTINUED | OUTPATIENT
Start: 2022-09-21 | End: 2022-09-22 | Stop reason: HOSPADM

## 2022-09-21 RX ORDER — OXYTOCIN 10 [USP'U]/ML
10 INJECTION, SOLUTION INTRAMUSCULAR; INTRAVENOUS
Status: DISCONTINUED | OUTPATIENT
Start: 2022-09-21 | End: 2022-09-22 | Stop reason: HOSPADM

## 2022-09-21 RX ORDER — CARBOPROST TROMETHAMINE 250 UG/ML
250 INJECTION, SOLUTION INTRAMUSCULAR
Status: DISCONTINUED | OUTPATIENT
Start: 2022-09-21 | End: 2022-09-22 | Stop reason: HOSPADM

## 2022-09-21 RX ORDER — HYDROCORTISONE 25 MG/G
CREAM TOPICAL 3 TIMES DAILY PRN
Status: DISCONTINUED | OUTPATIENT
Start: 2022-09-21 | End: 2022-09-22 | Stop reason: HOSPADM

## 2022-09-21 RX ORDER — METHYLERGONOVINE MALEATE 0.2 MG/ML
200 INJECTION INTRAVENOUS
Status: DISCONTINUED | OUTPATIENT
Start: 2022-09-21 | End: 2022-09-22 | Stop reason: HOSPADM

## 2022-09-21 RX ORDER — TRANEXAMIC ACID 10 MG/ML
1 INJECTION, SOLUTION INTRAVENOUS EVERY 30 MIN PRN
Status: DISCONTINUED | OUTPATIENT
Start: 2022-09-21 | End: 2022-09-22 | Stop reason: HOSPADM

## 2022-09-21 RX ORDER — OXYTOCIN/0.9 % SODIUM CHLORIDE 30/500 ML
340 PLASTIC BAG, INJECTION (ML) INTRAVENOUS CONTINUOUS PRN
Status: DISCONTINUED | OUTPATIENT
Start: 2022-09-21 | End: 2022-09-22 | Stop reason: HOSPADM

## 2022-09-21 RX ORDER — MISOPROSTOL 200 UG/1
400 TABLET ORAL
Status: DISCONTINUED | OUTPATIENT
Start: 2022-09-21 | End: 2022-09-22 | Stop reason: HOSPADM

## 2022-09-21 RX ORDER — BISACODYL 10 MG
10 SUPPOSITORY, RECTAL RECTAL DAILY PRN
Status: DISCONTINUED | OUTPATIENT
Start: 2022-09-21 | End: 2022-09-22 | Stop reason: HOSPADM

## 2022-09-21 RX ADMIN — ACETAMINOPHEN 650 MG: 325 TABLET, FILM COATED ORAL at 18:59

## 2022-09-21 RX ADMIN — Medication 340 ML/HR: at 02:00

## 2022-09-21 RX ADMIN — ACETAMINOPHEN 650 MG: 325 TABLET, FILM COATED ORAL at 08:36

## 2022-09-21 RX ADMIN — IBUPROFEN 800 MG: 400 TABLET, FILM COATED ORAL at 02:40

## 2022-09-21 RX ADMIN — ACETAMINOPHEN 650 MG: 325 TABLET, FILM COATED ORAL at 02:40

## 2022-09-21 RX ADMIN — LIDOCAINE HYDROCHLORIDE 20 ML: 10 INJECTION, SOLUTION EPIDURAL; INFILTRATION; INTRACAUDAL; PERINEURAL at 02:05

## 2022-09-21 RX ADMIN — IBUPROFEN 800 MG: 400 TABLET, FILM COATED ORAL at 08:36

## 2022-09-21 RX ADMIN — ACETAMINOPHEN 650 MG: 325 TABLET, FILM COATED ORAL at 14:17

## 2022-09-21 RX ADMIN — IBUPROFEN 800 MG: 400 TABLET, FILM COATED ORAL at 15:03

## 2022-09-21 RX ADMIN — DOCUSATE SODIUM 100 MG: 100 CAPSULE, LIQUID FILLED ORAL at 08:37

## 2022-09-21 RX ADMIN — IBUPROFEN 800 MG: 400 TABLET, FILM COATED ORAL at 21:02

## 2022-09-21 ASSESSMENT — ACTIVITIES OF DAILY LIVING (ADL)
ADLS_ACUITY_SCORE: 18

## 2022-09-21 NOTE — L&D DELIVERY NOTE
OB Vaginal Delivery Note    Melina Palacios MRN# [id number]   Age: 26 year old YOB: 1995       GA: 38w4d  GP:   Labor Complications: None   EBL:   200mL  Delivery Type: Vaginal, Spontaneous   ROM to Delivery Time: (Delivered) Hours: 3 Minutes: 30   Weight:  Pending for Do Hour   Sex: Male (Orion)   1 Minute 5 Minute 10 Minute   Apgar Totals: 8   9        JE DENNIS;DENNISE WILKERSON     Delivery Details:  Melina Palacios, a 26 year old  female delivered a viable infant with apgars of 8  and 9 . Patient was fully dilated and pushing after <5hr after AROM. Patient requested to deliver in hands-knees position, which was how the delivery was thus conducted. Delivery was via vaginal, spontaneous  to a sterile field under nitrous anesthesia. Infant delivered in vertex  middle  occiput  anterior  position. Anterior and posterior shoulders delivered without difficulty with both hands presenting along with the head. There was a double-nuchal cord which was tight as well as a body cord as well. Unable to reduce, so this was clamped and cut on the perineum. Cord blood was obtained in routine fashion with the following disposition:  lab.      Cord complications: nuchal x2, body x1, tight  Placenta delivered at 2022  2:00 AM . Placental disposition was Hospital disposal . Fundal massage performed and fundus found to be firm.     Episiotomy: none    Perineum, vagina, cervix were inspected, and the following lacerations were noted:   Perineal lacerations: 2nd                Any lacerations were repaired in the usual fashion using 3-0 Vicryl with Lidocaine anesthetic.    Excellent hemostasis was noted. Needle count correct. Infant and patient in delivery room in good and stable condition.        Kellen Palacios-Melina [[id number]]    Labor Event Times    Labor onset date: 22 Onset time:  9:30 PM   Dilation complete date: 22 Complete time:  1:54 AM   Start  pushing date/time: 2022 0154      Labor Events     labor?: No   steroids: None  Labor Type: AROM  Predominate monitoring during 1st stage: continuous electronic fetal monitoring     Antibiotics received during labor?: No     Rupture date/time: 22   Rupture type: Artificial Rupture of Membranes  Fluid color: Clear  Fluid odor: Normal     Augmentation: AROM  Indications for augmentation: Ineffective Contraction Pattern  1:1 continuous labor support provided by?: RN       Delivery/Placenta Date and Time    Delivery Date: 22 Delivery Time:  1:55 AM   Placenta Date/Time: 2022  2:00 AM  Oxytocin given at the time of delivery: after delivery of placenta  Delivering clinician: Robert Pavon MD   Other personnel present at delivery:  Provider Role   Pat Peraza RN Registered Nurse   Renate Yin RN Registered Nurse         Vaginal Counts     Initial count performed by 2 team members:  Two Team Members   Dr Librado Yin RN       Needles Suture Needles Sponges (RETIRED) Instruments   Initial counts 2 0 5    Added to count 0 1 0    Relief counts       Final counts 2 1 5          Placed during labor Accounted for at the end of labor   FSE NA NA   IUPC NA NA   Cervidil NA NA              Final count performed by 2 team members:  Two Team Members   Dr Librado Yin RN      Final count correct?: Yes     Apgars    Living status: Living   1 Minute 5 Minute 10 Minute 15 Minute 20 Minute   Skin color: 0  1       Heart rate: 2  2       Reflex irritability: 2  2       Muscle tone: 2  2       Respiratory effort: 2  2       Total: 8  9       Apgars assigned by: PAT PERAZA RN     Cord    Cord Complications: Nuchal   Nuchal Intervention: clamped and cut         Nuchal cord description: tight nuchal cord         Stem cell collection?: No       Labor Events and Shoulder Dystocia    Fetal Tracing Prior to Delivery: Category 1  Shoulder dystocia present?: Neg     Delivery  (Maternal) (Provider to Complete) (319331)    Episiotomy: None  Perineal lacerations: 2nd Repaired?: Yes   Repair suture: 3-0 Vicryl  Genital tract inspection done: Pos     Blood Loss  Mother: Melina Palacios #[id number]   Start of Mother's Information    Delivery Blood Loss  09/20/22 2130 - 09/21/22 0225    None           End of Mother's Information  Mother: Melina Palacios #[id number]          Delivery - Provider to Complete ([id number])    Delivering clinician: Robert Pavon MD  Attempted Delivery Types (Choose all that apply): Spontaneous Vaginal Delivery  Delivery Type (Choose the 1 that will go to the Birth History): Vaginal, Spontaneous                   Other personnel:  Provider Role   Pat Peraza RN Registered Nurse   eRnate Yin RN Registered Nurse                Placenta    Date/Time: 9/21/2022  2:00 AM  Removal: Spontaneous  Disposition: Hospital disposal           Anesthesia    Method: None                Presentation and Position    Presentation: Vertex    Position: Middle Occiput Anterior                 Robert Pavon MD

## 2022-09-21 NOTE — PLAN OF CARE
Transferred to Postpartum at 0410.    Vital signs stable. Postpartum assessment WDL. Pain controlled with tylenol and ibuprofen. Patient up with standby assistance and is voiding without difficulty. IV removed. Breastfeeding independently. RN provided latch check. Patient and infant bonding well. Will continue with current plan of care.

## 2022-09-21 NOTE — PLAN OF CARE
VSS, fundus firm, light flow. Declines IP and Tucks. Up to BR ind. voiding without difficulty. Breastfeeding her baby boy. Assistance provided with positioning, latching well. Tylenol and Ibuprofen for mild uterine cramping. Resting between cares today.

## 2022-09-21 NOTE — PROVIDER NOTIFICATION
09/21/22 0044   Provider Notification   Provider Name/Title Dr Pavon   Method of Notification Electronic Page   Request Evaluate - Remote   Notification Reason Status Update   7-8/80/-1, using nitrous UC q2-4 cat 1 tracing.

## 2022-09-21 NOTE — PLAN OF CARE
Lab called after Lety has shown up for lab draw. Lab states will alert phlebotomist and she will be drawn when they are available.

## 2022-09-21 NOTE — PLAN OF CARE
Pt VSS. Fundus firm, scant flow. Pain controlled with Ibu/Tyl. Breastfeeding infant, independent with infant cares. Voiding spontaneously. Hope to discharge tomorrow.    Checking with insurance about breast pump coverage.

## 2022-09-21 NOTE — PROVIDER NOTIFICATION
09/21/22 0152   Provider Notification   Provider Name/Title Dr Pavon   Method of Notification At Bedside   Request Attend Delivery   Notification Reason Lab/Diagnostic Study   Provider notified that patient has been refusing lab studies to be drawn. No new orders received, provider states to document patient refusal.

## 2022-09-21 NOTE — PLAN OF CARE
Pt requesting nitrous oxide. RN educates about proper way to hold mask on face and breathe in and out into the mask. Pt only to use the nitrous on herself, only she can hold mask, and she cannot prop up the mask with anything against her face. Patient agrees and declines having further questions. RN monitors first 5-10 minutes of nitrous usage, and patient sitting in bed using. Call light in reach, will continue to monitor.

## 2022-09-21 NOTE — PROGRESS NOTES
At bedside for AROM. Procedure explained to patient. Cervix 6.5/80/-2, fetal head engaged. Successful AROM yielding moderate volume clear fluid. Patient tolerated procedure well. FHT: 145, moderate variability, positive accels, no decels; [place]: q2-4min ctx. Cont labor.    Robert Pavon MD  [facility] LEON Dowell PA  9/20/2022 10:28 PM

## 2022-09-21 NOTE — PLAN OF CARE
"Laboratory to bedside, patient states \"I don't think I can do that right now and hold still during it\". Will notify lab at later time.  "

## 2022-09-21 NOTE — PLAN OF CARE
Data: Melina Palacios transferred to 427 via wheelchair at 0400. Baby transferred via parent's arms.  Action: Receiving unit notified of transfer: Yes. Patient and family notified of room change. Report given to Deepak RUBIO RN at 0400. Belongings sent to receiving unit. Accompanied by Registered Nurse. Oriented patient to surroundings. Call light within reach. ID bands double-checked with receiving RN.  Response: Patient tolerated transfer and is stable.

## 2022-09-21 NOTE — PLAN OF CARE
"Data: [facility].   Reason for maternal/fetal assessment per patient is Rule Out Labor  .  Patient is a . Prenatal record reviewed.      OB History    Para Term  AB Living   3 2 2 0 0 2   SAB IAB Ectopic Multiple Live Births   0 0 0 0 2      # Outcome Date GA Lbr Allan/2nd Weight Sex Delivery Anes PTL Lv   3 Current            2 Term 21 37w6d 03:40 / 00:11 2.89 kg (6 lb 5.9 oz) M Vag-Spont Local Y ALEA      Complications: Excessive Vaginal Bleeding      Name: CHARLES JOHNSON-DALLAS      Apgar1: 8  Apgar5: 9   1 Term 19 40w2d 04:20 / 03:53 3.827 kg (8 lb 7 oz) M Vag-Vacuum EPI N ALEA      Birth Comments: followed by [facility] and delivered by [facility]. PROM, refused pit all day. finally agreed and got 2 mu and rapid 1st stage. pushed 3+ hours and then refuse and wanted a c/s. consulted and did vavd x2 pulls. compound pres. 3rd degree, left sulcus/right labial t      Name: Hira      Apgar1: 8  Apgar5: 9   . Medical history:   Past Medical History:   Diagnosis Date     Depressive disorder      [place] (generalized anxiety disorder)      IBS (irritable bowel syndrome)      Other immediate postpartum hemorrhage 2019     Ovarian cyst      Third degree laceration of perineum during delivery, postpartum 2019     Vacuum extractor delivery, delivered 2019   . Gestational Age 38w3d. VSS. Fetal movement present. Patient feeling mild uterine contractions every 3-5 min, more uncomfortable through the night. Patient deniesvaginal discharge, pelvic pressure, UTI symptoms, GI problems, bloody show, vaginal bleeding, edema, headache, visual disturbances, epigastric or URQ pain, abdominal pain, rupture of membranes. Support persons Kevin present.  Action: Verbal consent for EFM. Triage assessment completed. EFM applied.   Response: Dr. Pavon in the department,  informed of SVE /-1, regular uterine contractions, pt feeling \"a little off\", temp 99.9, pt tachycardic. Plan per " provider is admit to labor, AROM. Patient verbalized agreement with plan. Patient transferred to room 218 ambulatory, oriented to room and call light. Report given to Ramonita Yin RN.

## 2022-09-21 NOTE — PLAN OF CARE
"Laboratory to bedside to attempt lab draw, patient requests for later lab draw. Stating \"I need time since we just did the IV\". RN will contact lab at later time.  "

## 2022-09-21 NOTE — PLAN OF CARE
"Patient questions about lab draw that has not happened yet, RN states laboratory can come draw labs once we get up to postpartum and patient states \"I would rather wait until I am more hydrated\".  "

## 2022-09-21 NOTE — PROVIDER NOTIFICATION
09/21/22 0146   Provider Notification   Provider Name/Title Dr. Pavon   Method of Notification Electronic Page   Request Evaluate in Person   Notification Reason Patient Request   Provider paged and called back. Updated that patient is feeling more pressure/urge to push. She is requesting provider to come to bedside.

## 2022-09-21 NOTE — H&P
"OB HISTORY AND PHYSICAL    Patient Name: Dallas Palacios   Admit Date: 920  MR #: [id number]   Acct #: [id number]   : 1995     Chief Complaint/Reason for Visit: possible labor    History of Present Illness: Dallas Palacios  is a 26 year old  at 38w3d here for eval for possible labor. She called in stating that she was having increased pressure, discomfort, nausea, erratic ctx. No LOF or VB. +FM. Of note was 6cm at 36w and discharged since remained unchanged.    She has a hx of traumatic pregnancies/deliveries with prior two. She did genetics with this pregnancy and CFFD was WNL, known male, will be \"Orion.\" She passed her 1hr GCT. She is GBS neg and Rh pos. She declined covid vaccination.    Review of Systems:  General: denies fevers  CV: denies CP  Pulm: denies SOB  Neuro: denies HA  Abd: denies N/V  Gyn: denies vaginal discharge  OB: see HPI  Skin: denies rashes  MSK: denies calf pain  Psych: denies depression     History:   OB History    Para Term  AB Living   3 2 2 0 0 2   SAB IAB Ectopic Multiple Live Births   0 0 0 0 2      # Outcome Date GA Lbr Allan/2nd Weight Sex Delivery Anes PTL Lv   3 Current            2 Term 21 37w6d 03:40 / 00:11 2.89 kg (6 lb 5.9 oz) M Vag-Spont Local Y ALEA      Complications: Excessive Vaginal Bleeding      Name: ELIZABETHMALE-DALLAS      Apgar1: 8  Apgar5: 9   1 Term 19 40w2d 04:20 / 03:53 3.827 kg (8 lb 7 oz) M Vag-Vacuum EPI N ALEA      Birth Comments: followed by [facility] and delivered by [facility]. PROM, refused pit all day. finally agreed and got 2 mu and rapid 1st stage. pushed 3+ hours and then refuse and wanted a c/s. consulted and did vavd x2 pulls. compound pres. 3rd degree, left sulcus/right labial t      Name: Hira      Apgar1: 8  Apgar5: 9         Past Medical History:   Diagnosis Date     Depressive disorder      [place] (generalized anxiety disorder)      IBS (irritable bowel syndrome)      Other immediate postpartum hemorrhage " 7/26/2019     Ovarian cyst      Third degree laceration of perineum during delivery, postpartum 7/26/2019     Vacuum extractor delivery, delivered 7/26/2019       Past Surgical History:   Procedure Laterality Date     ENT SURGERY      wisdom teeth extraction      wisdom teeth         Family History   Problem Relation Age of Onset     Cancer Mother      Breast Cancer Mother      Cancer Maternal Aunt      Breast Cancer Maternal Aunt      Thyroid Disease Maternal Grandmother      Breast Cancer Paternal Grandmother      No Known Problems Father      No Known Problems Sister      No Known Problems Brother      No Known Problems Maternal Grandfather      No Known Problems Other        Social History     Socioeconomic History     Marital status:      Spouse name: Not on file     Number of children: Not on file     Years of education: Not on file     Highest education level: Not on file   Occupational History     Not on file   Tobacco Use     Smoking status: Never Smoker     Smokeless tobacco: Never Used   Substance and Sexual Activity     Alcohol use: No     Drug use: No     Sexual activity: Yes     Partners: Male     Birth control/protection: None   Other Topics Concern     Parent/sibling w/ CABG, MI or angioplasty before 65F 55M? Not Asked   Social History Narrative     Not on file     Social Determinants of Health     Financial Resource Strain: Not on file   Food Insecurity: Not on file   Transportation Needs: Not on file   Physical Activity: Not on file   Stress: Not on file   Social Connections: Not on file   Intimate Partner Violence: Not on file   Housing Stability: Not on file       Allergy Information:        I have reviewed the patient's allergies.      @    Home Medications:   No current outpatient medications on file.       Physical Examination:  Vitals:  Temp:  [99.9  F (37.7  C)] 99.9  F (37.7  C)  Pulse:  [101] 101  Resp:  [18] 18  BP: (125)/(75) 125/75    Exam:  General: no acute distress  Head:  normocephalic, atraumatic  Eyes: EOMI  CV: pulses intact  Pulm: no increased effort  Neuro: CN II-XII grossly intact  Abd: gravid, soft, NTTP  Gyn: 6.5/80/-1  Skin: no rashes  MSK: no calf tenderness  Psych: AOx3, appropriate mood/affect    Fetus: FHT: Tracing Cat 1; [place]etta: q2-4min ctx.    Laboratory and Additional Data Reviewed:  Any associated labs, path, imaging personally reviewed  No results found for any visits on 22.  Temp 99.9, mild maternal tachycardia    Assessment and Plan: Melina Palacios is a 26 year old  at 38w3d here for labor  - Admit to L&D  - Pt declines epidural, considering what she wants for pain control (if anything)  - Plan AROM and anticipate  thereafter, can add Pit if labor stalls  - Tracing Cat 1  - GBS neg  - Rh pos  - Covid swab on admission per routine (martha w/ mild illness sx and now spont laboring)  - Otherwise routine intrapartum care      Robert Pavon MD  [facility] [place] [place]  2022, 8:46 PM

## 2022-09-21 NOTE — PLAN OF CARE
"Patient agrees to covid swab, declines IV at this time. She states the last IV she had \"was very painful so I would like to wait\". Dr Pavon aware, states he prefers IV prior to AROM. States he will discuss with patient. Patient also requests to await AROM until her  arrives.  "

## 2022-09-21 NOTE — PROVIDER NOTIFICATION
09/21/22 0136   Provider Notification   Provider Name/Title Dr Pavon   Method of Notification Electronic Page;Phone   Request Evaluate - Remote   Notification Reason SVE;Status Update   Provider updated that patient is 8-9cm. Feeling pressure to push. Per provider to call him when 9-10cm as he is in the call rooms to attend delivery. Will update when needed.

## 2022-09-22 VITALS
TEMPERATURE: 97.2 F | RESPIRATION RATE: 18 BRPM | HEART RATE: 70 BPM | DIASTOLIC BLOOD PRESSURE: 70 MMHG | SYSTOLIC BLOOD PRESSURE: 114 MMHG

## 2022-09-22 PROCEDURE — 250N000013 HC RX MED GY IP 250 OP 250 PS 637: Performed by: OBSTETRICS & GYNECOLOGY

## 2022-09-22 RX ADMIN — ACETAMINOPHEN 650 MG: 325 TABLET, FILM COATED ORAL at 03:11

## 2022-09-22 RX ADMIN — IBUPROFEN 800 MG: 400 TABLET, FILM COATED ORAL at 03:11

## 2022-09-22 RX ADMIN — ACETAMINOPHEN 650 MG: 325 TABLET, FILM COATED ORAL at 09:15

## 2022-09-22 RX ADMIN — IBUPROFEN 800 MG: 400 TABLET, FILM COATED ORAL at 09:15

## 2022-09-22 RX ADMIN — DOCUSATE SODIUM 100 MG: 100 CAPSULE, LIQUID FILLED ORAL at 08:22

## 2022-09-22 ASSESSMENT — ACTIVITIES OF DAILY LIVING (ADL)
ADLS_ACUITY_SCORE: 18

## 2022-09-22 NOTE — PLAN OF CARE
D: VSS, assessments WDL. Taking Tylenol and Ibuprofen for pain. Using huang bottle, declines IP and Tucks. Breastfeeding her baby boy and req. to pump this am and suppl. X 1 with Similac.   I: Pt. received complete discharge paperwork.  Pt. was given times of last dose for all discharge medications in writing on discharge medication sheets.  Discharge teaching included home medication, pain management, activity restrictions, postpartum cares, and signs and symptoms of infection.    A: Discharge outcomes on care plan met.  Mother states understanding and comfort with self care and follow up care.   P: Pt. Discharged.  Pt. was accompanied by  and left with personal belongings.  Home care sent.  Pt. to follow up with OB provider per discharge instructions.  Pt. had no further questions at the time of discharge and no unmet needs were identified.

## 2022-09-22 NOTE — LACTATION NOTE
"Lactation visit with Melina and baby boy.    This is Melina's third baby, her first two kids both had higher jaundice levels after birth and Melina is concerned with this baby's sleepiness at breast.    Infant is just about 18 hours old at time of visit. Infant has reportedly been sleepy at times and has also been fairly spitty. Reviewed  feeding basics; sleepiness in first day being really normal followed by a cluster feeding pattern on day 2.        Encouraged Sona to continue to:  1) Watch for early feeding cues (licking lips, stirring or rooting, sucking movement with mouth, hands to mouth) and always breast feed on DEMAND.  2) Infant should breastfeed a minimum of 8 times in 24 hours. If it has been 3 hours since last breast feeding session, un-swaddle infant and begin skin to skin to entice infant to nurse.    Assisted with a breast feeding session. Assisted Melina to hold infant in Cross cradle vs cradle hold. This positioning change helped Melina get infant latched deeper and she reports \"more comfortable\". We heard audible swallows. Educated on nutritive vs non-nutritive suckling patterns. Showed how to record infant feedings along with voids and stools in the provided feeding log.     Reviewed breastfeeding positions and techniques to obtain/maintain deep latch (including nose to nipple alignment and supporting infant's shoulder blades vs head when bringing infant in to latch). Discussed BF should feel like a strong \"tug or pull\" when infant is suckling and if mother experiences a \"pinching or biting\" sensation, how to un-latch infant properly, assess nipple shape and make any necessary adjustments with positioning before re-latching.     Discussed physiology of milk production from colostrum through milk coming in and how the breasts should begin to feel \"heavy or full\" between day 3-5. Answered questions regarding \"how to know when infant is done at the breast\". Educated to infant satiety signs; " encouraged listening for audible swallows along with watching for changes in infant's stool color. Discussed normal infant weight loss and when infant should be back to birth weight. Stressed the importance of continuing to track infant's feeds and void/stools patterns, at least until infant has returned to his birth weight.    Discussed pumping (when it's helpful, when it's necessary, and suggested to begin pumping around infant's one month of age after first morning breast-feed for building milk stash). Melina has a new breast pump for home use. Suggested Melina pump if infant is being supplemented.    Feeding plan recommendations: provide unlimited, on-demand breast feedings: At least 8-12 times/24 hours (reviewed early feeding cues). Suggested pumping if baby has a poor feeding or if supplementation is necessary. Encouraged on-going use of a feeding log or juan to record feedings along with void/stool patterns. Avoid pacifiers (until 1 month of age per AAP guidelines) and supplementation with formula unless medically indicated. Follow up with Pediatrician as requested and encouraged lactation follow up. Reviewed [place] outpatient lactation resources. Appreciative of visit.    Soraya Whitt RN, IBCLC

## 2022-09-22 NOTE — PLAN OF CARE
VS WDL. Fundus firm, midline, down 2. Scant rubra lochia. Up ad jennie moving well. Voiding spontaneously. Pain managed with tylenol and ibuprofen. Still working on  cares. Breastfeeding fair to well overnight, nipple shield was attempted overnight per patient request with little results.

## 2022-09-22 NOTE — PROGRESS NOTES
Post Partum Progress Note - Vaginal Delivery    Subjective:   The patient feels well.  Ambulating:  yes  Voiding: yes  Passing flatus: yes  Tolerating regular diet: yes  Pain well controlled: yes.    Subjective pain ratin out of 10  Lochia: normal    Infant status: well   Feeding via breast:      Objective:     Vitals:    22 1208 22 1536 22 0025 22 0821   BP: 110/62 114/67 115/69 114/70   BP Location:   Left arm    Patient Position:       Cuff Size:       Pulse: 76 82 79 70   Resp: 16 18 16 18   Temp: 97.4  F (36.3  C) 97.9  F (36.6  C) 98  F (36.7  C) 97.2  F (36.2  C)   TempSrc: Oral Oral Oral Oral       Physical Exam:  General: No acute distress, alert and oriented X 3.   Heart: RRR, No murmurs, rubs or gallop.   Lungs: Clear breath sounds bilaterally.  Adequate effort.   Abdomen:  Soft, firm fundus at umbilicus. Appropriately tender.     Incision N/A   Breast No erythema.  No evidence of infection   Extremities no calf tenderness; edema: none   Perineum N/A   Lochia  None/Light       Assessment & Plan   Melina Palacios is a 26 year old  s/p   at 38w5d on 22.     1.  Post Partum Day 1  - meeting all postpartum goals  - disc option for discharge today vs tomorrow, pt desires discharge home today if baby is able to be discharged, still waiting on repeat bilirubin results  2.  Activity:  Encouraged pelvic rest x6 weeks.  3.  Continue post partum care    Dispo: stable for discharge to home, f/u in clinic for PPV in 3-4 weeks; [place]      Ledy Vo MD  OB[facility]N, [facility]  2022 9:35 AM

## 2023-02-02 NOTE — PROGRESS NOTES
Feels well overall. Here alone today.  Fetal Movement: positive, denies loss of fluid/vb, no contractions. Been drinking more water.   All questions asked and answered.   Fetal kick counts reviewed and handout given  Reviewed PTL precautions and s/sx of preeclampsia; denies any S&S and aware of what to report    Return to clinic in 2 weeks    Rohini Stout, DNP, APRN, CNM     Hydroxychloroquine Counseling:  I discussed with the patient that a baseline ophthalmologic exam is needed at the start of therapy and every year thereafter while on therapy. A CBC may also be warranted for monitoring.  The side effects of this medication were discussed with the patient, including but not limited to agranulocytosis, aplastic anemia, seizures, rashes, retinopathy, and liver toxicity. Patient instructed to call the office should any adverse effect occur.  The patient verbalized understanding of the proper use and possible adverse effects of Plaquenil.  All the patient's questions and concerns were addressed.

## 2023-05-07 ENCOUNTER — HEALTH MAINTENANCE LETTER (OUTPATIENT)
Age: 28
End: 2023-05-07

## 2023-12-09 ENCOUNTER — HOSPITAL ENCOUNTER (EMERGENCY)
Facility: CLINIC | Age: 28
Discharge: HOME | End: 2023-12-09
Attending: EMERGENCY MEDICINE | Admitting: EMERGENCY MEDICINE
Payer: COMMERCIAL

## 2023-12-09 VITALS
HEIGHT: 68 IN | WEIGHT: 140 LBS | DIASTOLIC BLOOD PRESSURE: 57 MMHG | BODY MASS INDEX: 21.22 KG/M2 | TEMPERATURE: 99 F | OXYGEN SATURATION: 99 % | HEART RATE: 84 BPM | RESPIRATION RATE: 16 BRPM | SYSTOLIC BLOOD PRESSURE: 127 MMHG

## 2023-12-09 DIAGNOSIS — O03.9 MISCARRIAGE: ICD-10-CM

## 2023-12-09 LAB
BASOPHILS # BLD AUTO: 0 10E3/UL (ref 0–0.2)
BASOPHILS NFR BLD AUTO: 0 %
EOSINOPHIL # BLD AUTO: 0.1 10E3/UL (ref 0–0.7)
EOSINOPHIL NFR BLD AUTO: 2 %
ERYTHROCYTE [DISTWIDTH] IN BLOOD BY AUTOMATED COUNT: 14.2 % (ref 10–15)
HCG INTACT+B SERPL-ACNC: 418 MIU/ML
HCG SERPL QL: POSITIVE
HCT VFR BLD AUTO: 41 % (ref 35–47)
HGB BLD-MCNC: 12.9 G/DL (ref 11.7–15.7)
IMM GRANULOCYTES # BLD: 0 10E3/UL
IMM GRANULOCYTES NFR BLD: 0 %
LYMPHOCYTES # BLD AUTO: 1.3 10E3/UL (ref 0.8–5.3)
LYMPHOCYTES NFR BLD AUTO: 18 %
MCH RBC QN AUTO: 27.6 PG (ref 26.5–33)
MCHC RBC AUTO-ENTMCNC: 31.5 G/DL (ref 31.5–36.5)
MCV RBC AUTO: 88 FL (ref 78–100)
MONOCYTES # BLD AUTO: 0.7 10E3/UL (ref 0–1.3)
MONOCYTES NFR BLD AUTO: 11 %
NEUTROPHILS # BLD AUTO: 4.8 10E3/UL (ref 1.6–8.3)
NEUTROPHILS NFR BLD AUTO: 69 %
NRBC # BLD AUTO: 0 10E3/UL
NRBC BLD AUTO-RTO: 0 /100
PLATELET # BLD AUTO: 226 10E3/UL (ref 150–450)
RBC # BLD AUTO: 4.68 10E6/UL (ref 3.8–5.2)
WBC # BLD AUTO: 7 10E3/UL (ref 4–11)

## 2023-12-09 PROCEDURE — 99283 EMERGENCY DEPT VISIT LOW MDM: CPT

## 2023-12-09 PROCEDURE — 36415 COLL VENOUS BLD VENIPUNCTURE: CPT | Performed by: EMERGENCY MEDICINE

## 2023-12-09 PROCEDURE — 84703 CHORIONIC GONADOTROPIN ASSAY: CPT | Performed by: EMERGENCY MEDICINE

## 2023-12-09 PROCEDURE — 84702 CHORIONIC GONADOTROPIN TEST: CPT | Performed by: EMERGENCY MEDICINE

## 2023-12-09 PROCEDURE — 85025 COMPLETE CBC W/AUTO DIFF WBC: CPT | Performed by: EMERGENCY MEDICINE

## 2023-12-09 ASSESSMENT — ACTIVITIES OF DAILY LIVING (ADL)
ADLS_ACUITY_SCORE: 33
ADLS_ACUITY_SCORE: 33

## 2023-12-09 NOTE — ED PROVIDER NOTES
History     Chief Complaint:  Miscarriage       HPI   Melina Palacios is a 28 year old female presents with concerns for miscarriage.  She notes that she was about 6 weeks pregnant and initially was seen at a fertility clinic.  She had an ultrasound that showed failed pregnancy.  She then followed up about 10 days ago and had a second ultrasound noting failure progression. She was prescribed misoprostol and took that on Monday.  She had some bleeding but did not feel that she had enough bleeding that she would expect for miscarriage.  She then change clinics that she was having a hard time communicating with her OB clinic and went to a clinic [facility] where she had delivered her prior children.  She had a third ultrasound on Wednesday that still showed thickened endometrium and a gestational sac and was given a second dose misoprostol and had delivery of the gestational sac and ongoing bleeding.  She had a quantitative hCG that was 3000.  She had a repeat quant on Friday but that number is pending.  Still having on and off bleeding and slight cramping.  This is her first time experiencing a miscarriage.  She then is developed URI symptoms with her children bringing home a cold.  She was told things to watch out for fevers and chills and she is not sure if this miscarriage is going as noted and she is having URI symptoms complicating things or if she is having issues with a miscarriage causing fevers and chills.      Independent Historian:    Patient    Review of External Notes:  N/A    Medications:    calcium carbonate (TUMS) 500 MG chewable tablet  magnesium 250 MG tablet  Prenat w/o G-NA-Oytylsq-FA-DHA (PNV-DHA PO)        Past Medical History:    Past Medical History:   Diagnosis Date    Depressive disorder     [place] (generalized anxiety disorder)     IBS (irritable bowel syndrome)     Other immediate postpartum hemorrhage 7/26/2019    Ovarian cyst     Third degree laceration of perineum during delivery,  "postpartum 7/26/2019    Vacuum extractor delivery, delivered 7/26/2019       Past Surgical History:    Past Surgical History:   Procedure Laterality Date    ENT SURGERY      wisdom teeth extraction     wisdom teeth            Physical Exam   Patient Vitals for the past 24 hrs:   BP Temp Temp src Pulse Resp SpO2 Height Weight   12/09/23 1018 127/57 99  F (37.2  C) Temporal 84 16 99 % 1.727 m (5' 8\") 63.5 kg (140 lb)        Physical Exam  GENERAL: well developed, pleasant  HEAD: atraumatic  EYES: pupils reactive, extraocular muscles intact, conjunctivae normal  ENT:  mucus membranes moist  NECK:  trachea midline, normal range of motion  RESPIRATORY: no tachypnea, breath sounds clear to auscultation   CVS: normal S1/S2, no murmurs, intact distal pulses  ABDOMEN: soft, nontender, nondistention  MUSCULOSKELETAL: no deformities  SKIN: warm and dry, no acute rashes or ulceration  NEURO: GCS 15, cranial nerves intact, alert and oriented x3  PSYCH:  Mood/affect normal      Emergency Department Course       Laboratory:  Labs Ordered and Resulted from Time of ED Arrival to Time of ED Departure   HCG QUALITATIVE PREGNANCY - Abnormal       Result Value    hCG Serum Qualitative Positive (*)    HCG QUANTITATIVE PREGNANCY - Abnormal    hCG Quantitative 418 (*)    CBC WITH PLATELETS AND DIFFERENTIAL    WBC Count 7.0      RBC Count 4.68      Hemoglobin 12.9      Hematocrit 41.0      MCV 88      MCH 27.6      MCHC 31.5      RDW 14.2      Platelet Count 226      % Neutrophils 69      % Lymphocytes 18      % Monocytes 11      % Eosinophils 2      % Basophils 0      % Immature Granulocytes 0      NRBCs per 100 WBC 0      Absolute Neutrophils 4.8      Absolute Lymphocytes 1.3      Absolute Monocytes 0.7      Absolute Eosinophils 0.1      Absolute Basophils 0.0      Absolute Immature Granulocytes 0.0      Absolute NRBCs 0.0          Procedures       Emergency Department Course & Assessments:             Interventions:  Medications - No " data to display     Assessments:      Independent Interpretation (X-rays, CTs, rhythm strip):  None    Consultations/Discussion of Management or Tests:  None       Social Determinants of Health affecting care:  N/A     Disposition:  The patient was discharged to home.     Impression & Plan    CMS Diagnoses:           Medical Decision Making:  Patient presents with concerns for her miscarriage.  She has had a couple ultrasounds as an outpatient has had on and off bleeding but bleeding sounds like it is subsiding.  She also has recent URI symptoms.  She has a benign abdominal exam.  White blood cell count and hemoglobin are stable do not feel she has septic miscarriage.  Quantitative hCG is coming down appropriately.  Do not feel we need to do repeat ultrasound.  She has follow-up with OB.    Critical Care time:  was 0 minutes for this patient excluding procedures.    Diagnosis:    ICD-10-CM    1. Miscarriage  O03.9            Discharge Medications:  Discharge Medication List as of 12/9/2023 12:53 PM             Dewayne Chmaorro MD  12/9/2023   Dewayne Chamorro MD Adams, Shaun L, MD  12/09/23 9120

## 2024-05-24 ENCOUNTER — HOSPITAL ENCOUNTER (OUTPATIENT)
Facility: CLINIC | Age: 29
Discharge: HOME | End: 2024-05-24
Attending: OBSTETRICS & GYNECOLOGY | Admitting: OBSTETRICS & GYNECOLOGY
Payer: COMMERCIAL

## 2024-05-24 ENCOUNTER — HOSPITAL ENCOUNTER (OUTPATIENT)
Facility: CLINIC | Age: 29
End: 2024-05-24
Admitting: OBSTETRICS & GYNECOLOGY
Payer: COMMERCIAL

## 2024-05-24 VITALS — RESPIRATION RATE: 16 BRPM | SYSTOLIC BLOOD PRESSURE: 120 MMHG | TEMPERATURE: 98 F | DIASTOLIC BLOOD PRESSURE: 63 MMHG

## 2024-05-24 PROCEDURE — G0463 HOSPITAL OUTPT CLINIC VISIT: HCPCS

## 2024-05-24 RX ORDER — VITAMIN B COMPLEX
1 TABLET ORAL DAILY
COMMUNITY

## 2024-05-24 RX ORDER — METOCLOPRAMIDE 10 MG/1
10 TABLET ORAL EVERY 6 HOURS PRN
Status: DISCONTINUED | OUTPATIENT
Start: 2024-05-24 | End: 2024-05-25 | Stop reason: HOSPADM

## 2024-05-24 RX ORDER — ONDANSETRON 2 MG/ML
4 INJECTION INTRAMUSCULAR; INTRAVENOUS EVERY 6 HOURS PRN
Status: DISCONTINUED | OUTPATIENT
Start: 2024-05-24 | End: 2024-05-25 | Stop reason: HOSPADM

## 2024-05-24 RX ORDER — PROCHLORPERAZINE 25 MG
25 SUPPOSITORY, RECTAL RECTAL EVERY 12 HOURS PRN
Status: DISCONTINUED | OUTPATIENT
Start: 2024-05-24 | End: 2024-05-25 | Stop reason: HOSPADM

## 2024-05-24 RX ORDER — PROCHLORPERAZINE MALEATE 5 MG
10 TABLET ORAL EVERY 6 HOURS PRN
Status: DISCONTINUED | OUTPATIENT
Start: 2024-05-24 | End: 2024-05-25 | Stop reason: HOSPADM

## 2024-05-24 RX ORDER — ONDANSETRON 4 MG/1
4 TABLET, ORALLY DISINTEGRATING ORAL EVERY 6 HOURS PRN
Status: DISCONTINUED | OUTPATIENT
Start: 2024-05-24 | End: 2024-05-25 | Stop reason: HOSPADM

## 2024-05-24 RX ORDER — METOCLOPRAMIDE HYDROCHLORIDE 5 MG/ML
10 INJECTION INTRAMUSCULAR; INTRAVENOUS EVERY 6 HOURS PRN
Status: DISCONTINUED | OUTPATIENT
Start: 2024-05-24 | End: 2024-05-25 | Stop reason: HOSPADM

## 2024-05-24 ASSESSMENT — ACTIVITIES OF DAILY LIVING (ADL): ADLS_ACUITY_SCORE: 18

## 2024-05-25 NOTE — PROGRESS NOTES
Data: Patient presented to [facility]: 2024  9:26 PM.  Reason for maternal/fetal assessment is uterine contractions. Patient reports contractions starting at 4:30 pm tonight and feeling tightening every 5-10 minutes, states she feels them but not painful. Patient denies leaking of vaginal fluid/rupture of membranes, vaginal bleeding. Patient reports fetal movement is normal. Patient is a 23w4d .  Prenatal record reviewed. Pregnancy has been uncomplicated.    Vital signs wnl. Support person is not present.     Action: Verbal consent for EFM. Triage assessment completed.     Response: Patient verbalized agreement with plan. Will contact Dr. Pennington with update and further orders.    Dr. Pennington updated regarding cervix closed, contractions continue about q8 minutes per patient, toco not picking up contractions, patient feels the tightening but not pain of contractions.  FHT in 150s on US, baby very active. Pt denies leaking fluid, vaginal discharge, and UTI symptoms.     Orders to send patient home and follow-up at next appt on Tuesday. Educate patient regarding trina proctor and could be baseline for this pregnancy.     Reviewed discharge instructions with patient and answered all questions. Educated patient about BH contractions and to call provider back if contractions become painful.     Pt discharged ambulatory with discharge paperwork.

## 2024-05-25 NOTE — DISCHARGE INSTRUCTIONS
Learning About When to Call Your Doctor During Pregnancy (After 20 Weeks)  Overview  It's common to have concerns about what might be a problem when you're pregnant. Most pregnancies don't have any serious problems. But it's still important to know when to call your doctor if you have certain symptoms or signs of labor.  These are general suggestions. Your doctor may give you some more information about when to call.  When to call your doctor (after 20 weeks)  Call 911  anytime you think you may need emergency care. For example, call if:  You have severe vaginal bleeding. This means you are soaking through a pad each hour for 2 or more hours.  You have sudden, severe pain in your belly.  You have chest pain, are short of breath, or cough up blood.  You passed out (lost consciousness).  You have a seizure.  You see or feel the umbilical cord.  You think you are about to deliver your baby and can't make it safely to the hospital or birthing center.  Call your doctor now or seek immediate medical care if:  You have vaginal bleeding.  You have belly pain.  You have a fever.  You are dizzy or lightheaded, or you feel like you may faint.  You have signs of a blood clot in your leg (called a deep vein thrombosis), such as:  Pain in the calf, back of the knee, thigh, or groin.  Swelling in your leg or groin.  A color change on the leg or groin. The skin may be reddish or purplish, depending on your usual skin color.  You have symptoms of preeclampsia, such as:  Sudden swelling of your face, hands, or feet.  New vision problems (such as dimness, blurring, or seeing spots).  A severe headache.  You have a sudden release of fluid from your vagina. (You think your water broke.)  You've been having regular contractions for an hour. This means that you've had at least 6 contractions within 1 hour, even after you change your position and drink fluids.  You notice that your baby has stopped moving or is moving less than  "normal.  You have signs of heart failure, such as:  New or increased shortness of breath.  New or worse swelling in your legs, ankles, or feet.  Sudden weight gain, such as more than 2 to 3 pounds in a day or 5 pounds in a week.  Feeling so tired or weak that you cannot do your usual activities.  You have symptoms of a urinary tract infection. These may include:  Pain or burning when you urinate.  A frequent need to urinate without being able to pass much urine.  Pain in the flank, which is just below the rib cage and above the waist on either side of the back.  Blood in your urine.  Watch closely for changes in your health, and be sure to contact your doctor if:  You have vaginal discharge that smells bad.  You feel sad, anxious, or hopeless for more than a few days.  You have skin changes, such as a rash, itching, or a yellow color to your skin.  You have other concerns about your pregnancy.  If you have labor signs at 37 weeks or more  If you have signs of labor at 37 weeks or more, your doctor may tell you to call when your labor becomes more active. Symptoms of active labor include:  Contractions that are regular.  Contractions that are less than 5 minutes apart.  Contractions that are hard to talk through.  Follow-up care is a key part of your treatment and safety. Be sure to make and go to all appointments, and call your doctor if you are having problems. It's also a good idea to know your test results and keep a list of the medicines you take.  Where can you learn more?  Go to https://www.[employer].net/patiented  Enter N531 in the search box to learn more about \"Learning About When to Call Your Doctor During Pregnancy (After 20 Weeks).\"  Current as of: July 10, 2023               Content Version: 14.0    3910-4435 [employer].   Care instructions adapted under license by your healthcare professional. If you have questions about a medical condition or this instruction, always ask your healthcare " professional. [employer] disclaims any warranty or liability for your use of this information.         Acitretin Counseling:  I discussed with the patient the risks of acitretin including but not limited to hair loss, dry lips/skin/eyes, liver damage, hyperlipidemia, depression/suicidal ideation, photosensitivity.  Serious rare side effects can include but are not limited to pancreatitis, pseudotumor cerebri, bony changes, clot formation/stroke/heart attack.  Patient understands that alcohol is contraindicated since it can result in liver toxicity and significantly prolong the elimination of the drug by many years.

## 2024-07-14 ENCOUNTER — HEALTH MAINTENANCE LETTER (OUTPATIENT)
Age: 29
End: 2024-07-14

## 2024-09-23 ENCOUNTER — HOSPITAL ENCOUNTER (INPATIENT)
Facility: CLINIC | Age: 29
LOS: 1 days | Discharge: HOME | End: 2024-09-24
Attending: STUDENT IN AN ORGANIZED HEALTH CARE EDUCATION/TRAINING PROGRAM | Admitting: STUDENT IN AN ORGANIZED HEALTH CARE EDUCATION/TRAINING PROGRAM
Payer: COMMERCIAL

## 2024-09-23 LAB
ABO/RH(D): NORMAL
ANTIBODY SCREEN: NEGATIVE
HGB BLD-MCNC: 12.3 G/DL (ref 11.7–15.7)
HOLD SPECIMEN: NORMAL
SPECIMEN EXPIRATION DATE: NORMAL
T PALLIDUM AB SER QL: NONREACTIVE

## 2024-09-23 PROCEDURE — 120N000012 HC R&B POSTPARTUM

## 2024-09-23 PROCEDURE — 86780 TREPONEMA PALLIDUM: CPT | Performed by: STUDENT IN AN ORGANIZED HEALTH CARE EDUCATION/TRAINING PROGRAM

## 2024-09-23 PROCEDURE — 36415 COLL VENOUS BLD VENIPUNCTURE: CPT | Performed by: STUDENT IN AN ORGANIZED HEALTH CARE EDUCATION/TRAINING PROGRAM

## 2024-09-23 PROCEDURE — 86900 BLOOD TYPING SEROLOGIC ABO: CPT | Performed by: STUDENT IN AN ORGANIZED HEALTH CARE EDUCATION/TRAINING PROGRAM

## 2024-09-23 PROCEDURE — 250N000009 HC RX 250: Performed by: STUDENT IN AN ORGANIZED HEALTH CARE EDUCATION/TRAINING PROGRAM

## 2024-09-23 PROCEDURE — 10907ZC DRAINAGE OF AMNIOTIC FLUID, THERAPEUTIC FROM PRODUCTS OF CONCEPTION, VIA NATURAL OR ARTIFICIAL OPENING: ICD-10-PCS | Performed by: STUDENT IN AN ORGANIZED HEALTH CARE EDUCATION/TRAINING PROGRAM

## 2024-09-23 PROCEDURE — 722N000001 HC LABOR CARE VAGINAL DELIVERY SINGLE

## 2024-09-23 PROCEDURE — 999N000128 HC STATISTIC PERIPHERAL IV START W/O US GUIDANCE

## 2024-09-23 PROCEDURE — 250N000013 HC RX MED GY IP 250 OP 250 PS 637: Performed by: STUDENT IN AN ORGANIZED HEALTH CARE EDUCATION/TRAINING PROGRAM

## 2024-09-23 PROCEDURE — 85018 HEMOGLOBIN: CPT | Performed by: STUDENT IN AN ORGANIZED HEALTH CARE EDUCATION/TRAINING PROGRAM

## 2024-09-23 RX ORDER — FENTANYL CITRATE 50 UG/ML
100 INJECTION, SOLUTION INTRAMUSCULAR; INTRAVENOUS
Status: DISCONTINUED | OUTPATIENT
Start: 2024-09-23 | End: 2024-09-23 | Stop reason: HOSPADM

## 2024-09-23 RX ORDER — NALOXONE HYDROCHLORIDE 0.4 MG/ML
0.2 INJECTION, SOLUTION INTRAMUSCULAR; INTRAVENOUS; SUBCUTANEOUS
Status: DISCONTINUED | OUTPATIENT
Start: 2024-09-23 | End: 2024-09-23 | Stop reason: HOSPADM

## 2024-09-23 RX ORDER — CITRIC ACID/SODIUM CITRATE 334-500MG
30 SOLUTION, ORAL ORAL
Status: DISCONTINUED | OUTPATIENT
Start: 2024-09-23 | End: 2024-09-23 | Stop reason: HOSPADM

## 2024-09-23 RX ORDER — NALOXONE HYDROCHLORIDE 0.4 MG/ML
0.4 INJECTION, SOLUTION INTRAMUSCULAR; INTRAVENOUS; SUBCUTANEOUS
Status: DISCONTINUED | OUTPATIENT
Start: 2024-09-23 | End: 2024-09-23 | Stop reason: HOSPADM

## 2024-09-23 RX ORDER — METHYLERGONOVINE MALEATE 0.2 MG/ML
200 INJECTION INTRAVENOUS
Status: DISCONTINUED | OUTPATIENT
Start: 2024-09-23 | End: 2024-09-23 | Stop reason: HOSPADM

## 2024-09-23 RX ORDER — LOPERAMIDE HCL 2 MG
4 CAPSULE ORAL
Status: DISCONTINUED | OUTPATIENT
Start: 2024-09-23 | End: 2024-09-23 | Stop reason: HOSPADM

## 2024-09-23 RX ORDER — TRANEXAMIC ACID 10 MG/ML
1 INJECTION, SOLUTION INTRAVENOUS EVERY 30 MIN PRN
Status: DISCONTINUED | OUTPATIENT
Start: 2024-09-23 | End: 2024-09-24 | Stop reason: HOSPADM

## 2024-09-23 RX ORDER — MISOPROSTOL 200 UG/1
800 TABLET ORAL
Status: DISCONTINUED | OUTPATIENT
Start: 2024-09-23 | End: 2024-09-24 | Stop reason: HOSPADM

## 2024-09-23 RX ORDER — IBUPROFEN 400 MG/1
800 TABLET, FILM COATED ORAL
Status: DISCONTINUED | OUTPATIENT
Start: 2024-09-23 | End: 2024-09-24 | Stop reason: HOSPADM

## 2024-09-23 RX ORDER — NALOXONE HYDROCHLORIDE 0.4 MG/ML
0.2 INJECTION, SOLUTION INTRAMUSCULAR; INTRAVENOUS; SUBCUTANEOUS
Status: DISCONTINUED | OUTPATIENT
Start: 2024-09-23 | End: 2024-09-24 | Stop reason: HOSPADM

## 2024-09-23 RX ORDER — LOPERAMIDE HCL 2 MG
4 CAPSULE ORAL
Status: DISCONTINUED | OUTPATIENT
Start: 2024-09-23 | End: 2024-09-24 | Stop reason: HOSPADM

## 2024-09-23 RX ORDER — ACETAMINOPHEN 325 MG/1
650 TABLET ORAL EVERY 4 HOURS PRN
Status: DISCONTINUED | OUTPATIENT
Start: 2024-09-23 | End: 2024-09-24 | Stop reason: HOSPADM

## 2024-09-23 RX ORDER — ACETAMINOPHEN 325 MG/1
650 TABLET ORAL EVERY 4 HOURS PRN
Status: DISCONTINUED | OUTPATIENT
Start: 2024-09-23 | End: 2024-09-23 | Stop reason: HOSPADM

## 2024-09-23 RX ORDER — NALOXONE HYDROCHLORIDE 0.4 MG/ML
0.4 INJECTION, SOLUTION INTRAMUSCULAR; INTRAVENOUS; SUBCUTANEOUS
Status: DISCONTINUED | OUTPATIENT
Start: 2024-09-23 | End: 2024-09-24 | Stop reason: HOSPADM

## 2024-09-23 RX ORDER — BISACODYL 10 MG
10 SUPPOSITORY, RECTAL RECTAL DAILY PRN
Status: DISCONTINUED | OUTPATIENT
Start: 2024-09-23 | End: 2024-09-24 | Stop reason: HOSPADM

## 2024-09-23 RX ORDER — OXYTOCIN/0.9 % SODIUM CHLORIDE 30/500 ML
340 PLASTIC BAG, INJECTION (ML) INTRAVENOUS CONTINUOUS PRN
Status: DISCONTINUED | OUTPATIENT
Start: 2024-09-23 | End: 2024-09-23 | Stop reason: HOSPADM

## 2024-09-23 RX ORDER — SODIUM CHLORIDE, SODIUM LACTATE, POTASSIUM CHLORIDE, CALCIUM CHLORIDE 600; 310; 30; 20 MG/100ML; MG/100ML; MG/100ML; MG/100ML
INJECTION, SOLUTION INTRAVENOUS CONTINUOUS
Status: DISCONTINUED | OUTPATIENT
Start: 2024-09-23 | End: 2024-09-23 | Stop reason: HOSPADM

## 2024-09-23 RX ORDER — ONDANSETRON 4 MG/1
4 TABLET, ORALLY DISINTEGRATING ORAL EVERY 6 HOURS PRN
Status: DISCONTINUED | OUTPATIENT
Start: 2024-09-23 | End: 2024-09-23 | Stop reason: HOSPADM

## 2024-09-23 RX ORDER — OXYTOCIN/0.9 % SODIUM CHLORIDE 30/500 ML
340 PLASTIC BAG, INJECTION (ML) INTRAVENOUS CONTINUOUS PRN
Status: DISCONTINUED | OUTPATIENT
Start: 2024-09-23 | End: 2024-09-24 | Stop reason: HOSPADM

## 2024-09-23 RX ORDER — ONDANSETRON 2 MG/ML
4 INJECTION INTRAMUSCULAR; INTRAVENOUS EVERY 6 HOURS PRN
Status: DISCONTINUED | OUTPATIENT
Start: 2024-09-23 | End: 2024-09-23 | Stop reason: HOSPADM

## 2024-09-23 RX ORDER — OXYTOCIN 10 [USP'U]/ML
10 INJECTION, SOLUTION INTRAMUSCULAR; INTRAVENOUS
Status: DISCONTINUED | OUTPATIENT
Start: 2024-09-23 | End: 2024-09-23 | Stop reason: HOSPADM

## 2024-09-23 RX ORDER — METHYLERGONOVINE MALEATE 0.2 MG/ML
200 INJECTION INTRAVENOUS
Status: DISCONTINUED | OUTPATIENT
Start: 2024-09-23 | End: 2024-09-24 | Stop reason: HOSPADM

## 2024-09-23 RX ORDER — METOCLOPRAMIDE 10 MG/1
10 TABLET ORAL EVERY 6 HOURS PRN
Status: DISCONTINUED | OUTPATIENT
Start: 2024-09-23 | End: 2024-09-23 | Stop reason: HOSPADM

## 2024-09-23 RX ORDER — MISOPROSTOL 200 UG/1
800 TABLET ORAL
Status: DISCONTINUED | OUTPATIENT
Start: 2024-09-23 | End: 2024-09-23 | Stop reason: HOSPADM

## 2024-09-23 RX ORDER — LOPERAMIDE HCL 2 MG
2 CAPSULE ORAL
Status: DISCONTINUED | OUTPATIENT
Start: 2024-09-23 | End: 2024-09-24 | Stop reason: HOSPADM

## 2024-09-23 RX ORDER — OXYTOCIN/0.9 % SODIUM CHLORIDE 30/500 ML
100-340 PLASTIC BAG, INJECTION (ML) INTRAVENOUS CONTINUOUS PRN
Status: DISCONTINUED | OUTPATIENT
Start: 2024-09-23 | End: 2024-09-24 | Stop reason: HOSPADM

## 2024-09-23 RX ORDER — TRANEXAMIC ACID 10 MG/ML
1 INJECTION, SOLUTION INTRAVENOUS EVERY 30 MIN PRN
Status: DISCONTINUED | OUTPATIENT
Start: 2024-09-23 | End: 2024-09-23 | Stop reason: HOSPADM

## 2024-09-23 RX ORDER — METOCLOPRAMIDE HYDROCHLORIDE 5 MG/ML
10 INJECTION INTRAMUSCULAR; INTRAVENOUS EVERY 6 HOURS PRN
Status: DISCONTINUED | OUTPATIENT
Start: 2024-09-23 | End: 2024-09-23 | Stop reason: HOSPADM

## 2024-09-23 RX ORDER — MISOPROSTOL 200 UG/1
400 TABLET ORAL
Status: DISCONTINUED | OUTPATIENT
Start: 2024-09-23 | End: 2024-09-23 | Stop reason: HOSPADM

## 2024-09-23 RX ORDER — LOPERAMIDE HCL 2 MG
2 CAPSULE ORAL
Status: DISCONTINUED | OUTPATIENT
Start: 2024-09-23 | End: 2024-09-23 | Stop reason: HOSPADM

## 2024-09-23 RX ORDER — CITRIC ACID/SODIUM CITRATE 334-500MG
30 SOLUTION, ORAL ORAL ONCE
Status: DISCONTINUED | OUTPATIENT
Start: 2024-09-23 | End: 2024-09-23 | Stop reason: HOSPADM

## 2024-09-23 RX ORDER — OXYTOCIN 10 [USP'U]/ML
10 INJECTION, SOLUTION INTRAMUSCULAR; INTRAVENOUS
Status: DISCONTINUED | OUTPATIENT
Start: 2024-09-23 | End: 2024-09-24 | Stop reason: HOSPADM

## 2024-09-23 RX ORDER — HYDROCORTISONE 25 MG/G
CREAM TOPICAL 3 TIMES DAILY PRN
Status: DISCONTINUED | OUTPATIENT
Start: 2024-09-23 | End: 2024-09-24 | Stop reason: HOSPADM

## 2024-09-23 RX ORDER — KETOROLAC TROMETHAMINE 30 MG/ML
30 INJECTION, SOLUTION INTRAMUSCULAR; INTRAVENOUS
Status: DISCONTINUED | OUTPATIENT
Start: 2024-09-23 | End: 2024-09-24 | Stop reason: HOSPADM

## 2024-09-23 RX ORDER — MODIFIED LANOLIN
OINTMENT (GRAM) TOPICAL
Status: DISCONTINUED | OUTPATIENT
Start: 2024-09-23 | End: 2024-09-24 | Stop reason: HOSPADM

## 2024-09-23 RX ORDER — DOCUSATE SODIUM 100 MG/1
100 CAPSULE, LIQUID FILLED ORAL DAILY
Status: DISCONTINUED | OUTPATIENT
Start: 2024-09-23 | End: 2024-09-24 | Stop reason: HOSPADM

## 2024-09-23 RX ORDER — OXYCODONE HYDROCHLORIDE 5 MG/1
5 TABLET ORAL
Status: DISCONTINUED | OUTPATIENT
Start: 2024-09-23 | End: 2024-09-24 | Stop reason: HOSPADM

## 2024-09-23 RX ORDER — CARBOPROST TROMETHAMINE 250 UG/ML
250 INJECTION, SOLUTION INTRAMUSCULAR
Status: DISCONTINUED | OUTPATIENT
Start: 2024-09-23 | End: 2024-09-24 | Stop reason: HOSPADM

## 2024-09-23 RX ORDER — MISOPROSTOL 200 UG/1
400 TABLET ORAL
Status: DISCONTINUED | OUTPATIENT
Start: 2024-09-23 | End: 2024-09-24 | Stop reason: HOSPADM

## 2024-09-23 RX ORDER — PROCHLORPERAZINE MALEATE 5 MG
10 TABLET ORAL EVERY 6 HOURS PRN
Status: DISCONTINUED | OUTPATIENT
Start: 2024-09-23 | End: 2024-09-23 | Stop reason: HOSPADM

## 2024-09-23 RX ORDER — CARBOPROST TROMETHAMINE 250 UG/ML
250 INJECTION, SOLUTION INTRAMUSCULAR
Status: DISCONTINUED | OUTPATIENT
Start: 2024-09-23 | End: 2024-09-23 | Stop reason: HOSPADM

## 2024-09-23 RX ORDER — IBUPROFEN 400 MG/1
800 TABLET, FILM COATED ORAL EVERY 6 HOURS PRN
Status: DISCONTINUED | OUTPATIENT
Start: 2024-09-23 | End: 2024-09-24 | Stop reason: HOSPADM

## 2024-09-23 RX ADMIN — Medication 340 ML/HR: at 12:32

## 2024-09-23 RX ADMIN — IBUPROFEN 800 MG: 400 TABLET, FILM COATED ORAL at 13:35

## 2024-09-23 RX ADMIN — ACETAMINOPHEN 650 MG: 325 TABLET ORAL at 13:35

## 2024-09-23 ASSESSMENT — ACTIVITIES OF DAILY LIVING (ADL)
ADLS_ACUITY_SCORE: 18
ADLS_ACUITY_SCORE: 35
ADLS_ACUITY_SCORE: 18

## 2024-09-23 NOTE — PROVIDER NOTIFICATION
09/23/24 1100   Provider Notification   Provider Name/Title Dr Vo   Method of Notification Electronic Page   Notification Reason Labor Status;Uterine Activity;Pain;Status Update;SVE;Patient Request

## 2024-09-23 NOTE — PROGRESS NOTES
Pt arrives for induction of labor. Verbal consent received to place external monitors. Admission intake done. Prenatal reviewed. POC discussed with pt. Pt agrees. Pt's  supportive at bedside.

## 2024-09-23 NOTE — H&P
OB HISTORY AND PHYSICAL    Patient Name: Melina Johnson   Admit Date: 923  MR #: [id number]   Acct #: [id number]   : 1995     Chief Complaint/Reason for Visit: induction    History of Present Illness: Melina Johnson  is a 28 year old  at 41w0d here for induction of labor due to late term pregnancy. +FM, denies ctx or LOF. She has had spotting since her membrane sweep last week.    Melina's prenatal care is notable for NIPS low risk, XY (pt aware, has three boys at home). Anatomy US wnl. Passed 1hr gtt. GBS negative. Iron deficiency without anemia, pt had low ferritin, referred for iron infusions with improvement of symptoms.    Review of Systems:  General: denies fevers  CV: denies CP  Pulm: denies SOB  Neuro: denies HA  Abd: denies N/V  Gyn: denies vaginal discharge  Skin: denies rashes  MSK: denies calf pain  Psych: denies depression     History:   OB History    Para Term  AB Living   5 3 3 0 1 3   SAB IAB Ectopic Multiple Live Births   1 0 0 0 3      # Outcome Date GA Lbr Allan/2nd Weight Sex Type Anes PTL Lv   5 Current            4 Term 22 38w4d 04:24 / 00:01 3.64 kg (8 lb 0.4 oz) M Vag-Spont None N ALEA      Name: ELIZABETH JOHNSON      Apgar1: 8  Apgar5: 9   3 Term 21 37w6d 03:40 / 00:11 2.89 kg (6 lb 5.9 oz) M Vag-Spont Local Y ALEA      Complications: Excessive Vaginal Bleeding      Name: ELIZABETH JOHNSON      Apgar1: 8  Apgar5: 9   2 Term 19 40w2d 04:20 / 03:53 3.827 kg (8 lb 7 oz) M Vag-Vacuum EPI N ALEA      Birth Comments: followed by [facility] and delivered by [facility]. PROM, refused pit all day. finally agreed and got 2 mu and rapid 1st stage. pushed 3+ hours and then refuse and wanted a c/s. consulted and did vavd x2 pulls. compound pres. 3rd degree, left sulcus/right labial t      Name: Hira      Apgar1: 8  Apgar5: 9   1 SAB                  Past Medical History:   Diagnosis Date    Depressive disorder     [place] (generalized anxiety disorder)     IBS  (irritable bowel syndrome)     Other immediate postpartum hemorrhage 7/26/2019    Ovarian cyst     Third degree laceration of perineum during delivery, postpartum 7/26/2019    Vacuum extractor delivery, delivered 7/26/2019       Past Surgical History:   Procedure Laterality Date    ENT SURGERY      wisdom teeth extraction     wisdom teeth         Family History   Problem Relation Age of Onset    Cancer Mother     Breast Cancer Mother     Cancer Maternal Aunt     Breast Cancer Maternal Aunt     Thyroid Disease Maternal Grandmother     Breast Cancer Paternal Grandmother     No Known Problems Father     No Known Problems Sister     No Known Problems Brother     No Known Problems Maternal Grandfather     No Known Problems Other        Social History     Socioeconomic History    Marital status:      Spouse name: Not on file    Number of children: Not on file    Years of education: Not on file    Highest education level: Not on file   Occupational History    Not on file   Tobacco Use    Smoking status: Never    Smokeless tobacco: Never   Substance and Sexual Activity    Alcohol use: Not Currently    Drug use: No    Sexual activity: Yes     Partners: Male     Birth control/protection: None   Other Topics Concern    Parent/sibling w/ CABG, MI or angioplasty before 65F 55M? Not Asked   Social History Narrative    Not on file     Social Determinants of Health     Financial Resource Strain: Not on file   Food Insecurity: Not on file   Transportation Needs: Not on file   Physical Activity: Not on file   Stress: Not on file   Social Connections: Not on file   Interpersonal Safety: Low Risk  (9/23/2024)    Interpersonal Safety     Do you feel physically and emotionally safe where you currently live?: Yes     Within the past 12 months, have you been hit, slapped, kicked or otherwise physically hurt by someone?: No     Within the past 12 months, have you been humiliated or emotionally abused in other ways by your partner or  ex-partner?: No   Housing Stability: Not on file       Allergy Information:        I have reviewed the patient's allergies.      ALLERGY@    Home Medications:   No current outpatient medications on file.       Physical Examination:  Vitals:       Exam:  General: no acute distress  Head: normocephalic, atraumatic  Eyes: EOMI  CV: pulses intact  Pulm: no increased effort  Neuro: CN II-XII grossly intact  Abd: gravid, soft, NTTP  Gyn: 580/-1, AROM  Skin: no rashes  MSK: no calf tenderness  Psych: AOx3, appropriate mood/affect    Fetus: FHT: 145, moderate variability, positive accels, no decels; [place]: irregular ctx.    Laboratory and Additional Data Reviewed:  Any associated labs, path, imaging personally reviewed  Results for orders placed or performed during the hospital encounter of 24   Hemoglobin     Status: Normal   Result Value Ref Range    Hemoglobin 12.3 11.7 - 15.7 g/dL   Extra Green Top Tube (LAB USE ONLY)     Status: None   Result Value Ref Range    Hold Specimen .    Adult Type and Screen     Status: None (Preliminary result)   Result Value Ref Range    ABO/RH(D) A POS     SPECIMEN EXPIRATION DATE [id number]    ABO/Rh type and screen     Status: None (In process)    Narrative    The following orders were created for panel order ABO/Rh type and screen.  Procedure                               Abnormality         Status                     ---------                               -----------         ------                     Adult Type and Screen[[id number]]                            Preliminary result           Please view results for these tests on the individual orders.         Assessment and Plan: Melina Palacios is a 28 year old  at 41w0d here for IOL for late term pregnancy and favorable cervix  - AROM  - pt desires to avoid pitocin if possible, disc observation after AROM for 4-6 hours, if no progress will recommend pitocin at that time, pt ok with this plan  - pain mgmt per pt  request  - GBS negative    Dispo: admit to L&D for IOL      Ledy Vo MD  [facility]  9/23/2024, 9:17 AM

## 2024-09-23 NOTE — L&D DELIVERY NOTE
"Vaginal Delivery Procedure Note     Delivery provider: Ledy Vo MD; [facility]     Delivery date/time: 2024     Preop Dx:   IUP at 41w0d    Postop Dx:   Same as above     Procedure: Spontaneous Vaginal Delivery     Anesthesia:  None    Delivery QBL (mL): 50     Specimens: cord blood      Findings: VMI \"Carlitos\", cephalic presentation, clear fluid, no nuchal cord , 3-vessel cord, Apgars 9/9 at 1 and 5 minutes respectively, Fetal weight 9lb 2oz,  no lacerations noted            Labor Course: Melina Palacios is a 28 year old  at 41w0d who presented to L&D for induction of labor, she was 5/80/-1. AROM, clear fluid. Progressed to complete.     Fetal tracing 1 throughout labor and 1 during pushing efforts to delivery     Delivery details:  At bedside. Patient complete and pushing, patient preferred to push on hands and knees. Using inhaled nitrous for supplemental pain mgmt. Partner and  (Burt from [facility] [employer]) both at bedside and supportive.  With excellent maternal expulsive efforts, the infant's head delivered. Examined for nuchal cord and none noted. Shoulders followed without force or delay. Delivered VMI to mother's abdomen. Cord clamped and cut after 60 seconds; 3VC.  Placenta delivered via gentle traction and fundal massage. Perineum examined and no lacerations noted. Remainder of exam showed no other vaginal or cervical lacerations. EBL 50cc. Apgars 9/9 at 1 and 5 minutes respectively. Vaginal counts correct. Fundus firm at -2 with scant/normal flow after.    Sponge and needle count were correct.      stable with mother admitted to NBN. Mother stable in delivery room.     Ledy Vo MD   "

## 2024-09-23 NOTE — PROGRESS NOTES
Pt transferred to postpartum unit via wheelchair. Pt tolerated tx well. Report given to GILBERTO Arguello RN.

## 2024-09-23 NOTE — PROVIDER NOTIFICATION
09/23/24 1135   Provider Notification   Provider Name/Title Dr Vo   Method of Notification Phone   Request Attend Delivery

## 2024-09-23 NOTE — PLAN OF CARE
Goal Outcome Evaluation:      Plan of Care Reviewed With: patient, spouse    Overall Patient Progress: improvingOverall Patient Progress: improving     Patient and family oriented to room and floor. Infant safety and use of the bulb suction discussed. Call light within reach. Vital signs stable. Patient voiding without difficulty. Able to ambulate in room free of dizziness. Taking tylenol/ibuprofen for pain management. Working on breastfeeding infant every 2-3 hours. Questions/concerns addressed.

## 2024-09-24 VITALS
SYSTOLIC BLOOD PRESSURE: 117 MMHG | HEIGHT: 68 IN | RESPIRATION RATE: 16 BRPM | TEMPERATURE: 98.1 F | HEART RATE: 76 BPM | WEIGHT: 162.5 LBS | BODY MASS INDEX: 24.63 KG/M2 | DIASTOLIC BLOOD PRESSURE: 76 MMHG

## 2024-09-24 PROCEDURE — 250N000013 HC RX MED GY IP 250 OP 250 PS 637: Performed by: STUDENT IN AN ORGANIZED HEALTH CARE EDUCATION/TRAINING PROGRAM

## 2024-09-24 RX ADMIN — DOCUSATE SODIUM 100 MG: 100 CAPSULE, LIQUID FILLED ORAL at 07:41

## 2024-09-24 ASSESSMENT — ACTIVITIES OF DAILY LIVING (ADL)
ADLS_ACUITY_SCORE: 18

## 2024-09-24 NOTE — PROGRESS NOTES
Post Partum Progress Note - Vaginal Delivery    Subjective:   The patient feels  well, no complaints . Desires discharge today if possible  Ambulating:  yes  Voiding: yes  Passing flatus: yes  Tolerating regular diet: yes  Pain well controlled: yes.    Subjective pain ratin out of 10  Lochia: normal    Infant status: well   Feeding via breast:      Objective:     Vitals:    24 1930 24 2339 24 0307 24 0740   BP: 135/79 127/77 114/66 108/69   BP Location: Right arm Right arm Left arm    Patient Position: Semi-Conroy's Semi-Conroy's Semi-Conroy's    Cuff Size: Adult Regular Adult Regular Adult Regular    Pulse: 75 69 73 81   Resp: 16 16 16 16   Temp: 99.1  F (37.3  C) 98  F (36.7  C) 97.7  F (36.5  C) 98  F (36.7  C)   TempSrc: Oral Oral Oral Oral   Weight:       Height:           Physical Exam:  General: No acute distress, alert and oriented X 3.   Heart: RRR, No murmurs, rubs or gallop.   Lungs: Clear breath sounds bilaterally.  Adequate effort.   Abdomen:  Soft, firm fundus at umbilicus. Appropriately tender.     Incision N/A   Breast No erythema.  No evidence of infection   Extremities no calf tenderness; edema: none   Perineum N/A   Lochia  None/Light       Assessment & Plan   Melina Palacios is a 28 year old  s/p   at 41w0d on 24.     1.  Post Partum Day 1  - meeting postpartum goals  - disc if baby stable for discharge this afternoon, okay with patient discharge as well. If baby needs to stay, RN to cancel discharge order.  2.  Activity:  Encouraged pelvic rest x6 weeks.  3.  Continue post partum care    Dispo: stable for discharge to home pending baby dispo.    MD Lamont Ruiz OBG[facility]  2024 8:14 AM

## 2024-09-24 NOTE — DISCHARGE INSTRUCTIONS
Warning Signs after Having a Baby    Keep this paper on your fridge or somewhere else where you can see it.    Call your provider if you have any of these symptoms up to 12 weeks after having your baby.    Thoughts of hurting yourself or your baby  Pain in your chest or trouble breathing  Severe headache not helped by pain medicine  Eyesight concerns (blurry vision, seeing spots or flashes of light, other changes to eyesight)  Fainting, shaking or other signs of a seizure    Call 9-1-1 if you feel that it is an emergency.     The symptoms below can happen to anyone after giving birth. They can be very serious. Call your provider if you have any of these warning signs.    My provider s phone number: _______________________    Losing too much blood (hemorrhage)    Call your provider if you soak through a pad in less than an hour or pass blood clots bigger than a golf ball. These may be signs that you are bleeding too much.    Blood clots in the legs or lungs    After you give birth, your body naturally clots its blood to help prevent blood loss. Sometimes this increased clotting can happen in other areas of the body, like the legs or lungs. This can block your blood flow and be very dangerous.     Call your provider if you:  Have a red, swollen spot on the back of your leg that is warm or painful when you touch it.   Are coughing up blood.     Infection    Call your provider if you have any of these symptoms:  Fever of 100.4 F (38 C) or higher.  Pain or redness around your stitches if you had an incision.   Any yellow, white, or green fluid coming from places where you had stitches or surgery.    Mood Problems (postpartum depression)    Many people feel sad or have mood changes after having a baby. But for some people, these mood swings are worse.     Call your provider right away if you feel so anxious or nervous that you can't care for yourself or your baby.    Preeclampsia (high blood pressure)    Even if you  didn't have high blood pressure when you were pregnant, you are at risk for the high blood pressure disease called preeclampsia. This risk can last up to 12 weeks after giving birth.     Call your provider if you have:   Pain on your right side under your rib cage  Sudden swelling in the hands and face    Remember: You know your body. If something doesn't feel right, get medical help.     For informational purposes only. Not to replace the advice of your health care provider. Copyright 2020 [facility]. All rights reserved. Clinically reviewed by Deisi Felder, RNC-OB, MSN. Refurrl [id number] - Rev 02/23.

## 2024-09-24 NOTE — PLAN OF CARE
Vital signs stable. Postpartum assessment WDL. Pain well controlled denies pain meds now  Patient up ambulating  voiding without difficulty. Breastfeeding well  Patient and infant bonding well. Planning to discharge today Will continue with current plan of care.

## 2024-09-24 NOTE — PLAN OF CARE
D: VSS, assessments WDL.   I: Pt. received complete discharge paperwork and home medications .  Pt. was given times of last dose for all discharge medications in writing on discharge medication sheets.  Discharge teaching included home medication, pain management, activity restrictions, postpartum cares, and signs and symptoms of infection.    A: Discharge outcomes on care plan met.  Mother states understanding and comfort with self care and follow up care.   P: Pt. Discharged.  Pt. was accompanied by Spouse  and left with personal belongings.  .  Pt. to follow up with OB provider per discharge instructions.  Pt. had no further questions at the time of discharge and no unmet needs were identified.

## 2024-09-24 NOTE — LACTATION NOTE
Lactation visit with Melina & baby boy Carlitos. Hoping to discharge home this afternoon if baby's 24 hour screenings are WNL. Melina reports feeding is going well so far, does share baby is feeding better on the right side than on the left side. Melina was working on feeding on left side at time of visit and baby had latched on with a deep latch noted. Melina shared she'd started the feeding on the right side; encouraged her to keep working on feeding on both sides, but suggested starting on right side at next few feedings to help baby with latching success, and see if that improves his success on both sides. Reviewed eventual goal to alternate start sides with breastfeeding to help with milk production. Melina shared she needed to use a nipple shield in the beginning on left side, with her other children. Since Carlitos has been latching without the shield, suggested continuing to try without shield.   Reviewed satiety cues, feeding cues, and reviewed Feeding Log for home use. Encouraged to review Breastfeeding section in Your Guide to Postpartum & [facility]inunk Care.    Feeding plan: Recommend unlimited, frequent breast feedings: At least 8 - 12 times every 24 hours. Avoid pacifiers and supplementation with formula unless medically indicated. Encouraged use of feeding log and to record feedings, and void/stool patterns. Melina has a breast pump for home use. Reviewed outpatient lactation resources. Melina very appreciative of visit.    Judith Servin, RN, BSN, MNN, IBCLC

## 2024-09-24 NOTE — PLAN OF CARE
Goal Outcome Evaluation:      Plan of Care Reviewed With: patient, spouse    Overall Patient Progress: improvingOverall Patient Progress: improving  Vital signs stable. Postpartum assessment WDL. Pt. denied pain or nausea. Patient voiding without difficulty. Breastfeeding on cue well.  Patient bonding well with infant. Will continue with current plan of care.

## 2025-07-19 ENCOUNTER — HEALTH MAINTENANCE LETTER (OUTPATIENT)
Age: 30
End: 2025-07-19